# Patient Record
Sex: FEMALE | Race: WHITE | Employment: OTHER | RURAL
[De-identification: names, ages, dates, MRNs, and addresses within clinical notes are randomized per-mention and may not be internally consistent; named-entity substitution may affect disease eponyms.]

---

## 2017-03-07 ENCOUNTER — OFFICE VISIT (OUTPATIENT)
Dept: FAMILY MEDICINE CLINIC | Age: 67
End: 2017-03-07

## 2017-03-07 VITALS
SYSTOLIC BLOOD PRESSURE: 100 MMHG | HEART RATE: 86 BPM | OXYGEN SATURATION: 96 % | DIASTOLIC BLOOD PRESSURE: 62 MMHG | BODY MASS INDEX: 21.13 KG/M2 | RESPIRATION RATE: 16 BRPM | WEIGHT: 127 LBS

## 2017-03-07 DIAGNOSIS — J06.9 URI WITH COUGH AND CONGESTION: Primary | ICD-10-CM

## 2017-03-07 NOTE — PROGRESS NOTES
Chief Complaint   Patient presents with   Robbin Distance     productive cough       HPI    Mark Jones is a 78 y.o. female who complains of recent onset of nasal congestion, sore throat, ear fullness and lethargy. Patient also noted that OTC remedies did not help. ROS:    Denies  nausea, vomiting, diarrhea, weight loss, weight gain, hemoptysis, hematochezia or melena. Denies rash, frequency, dysuria, or joint pain. EXAM:   The patient appears well, alert, oriented x 3, in no distress. Visit Vitals    /62 (BP 1 Location: Left arm, BP Patient Position: Sitting)    Pulse 86    Resp 16    Wt 127 lb (57.6 kg)    SpO2 96%    BMI 21.13 kg/m2     HEENT:  NC/AT PERRLA, EOMI, oropharynx is clear, oral mucosa is pink and moist  Neck: supple, without JVD, thyromegaly, mass or bruit  Lungs are clear,without wheezes, rales, rubs or ronchi   Cardiovascular: RRR without MGR  Abdomen is soft without tenderness, guarding, mass or organomegaly. Extremities: no cyanosis, clubbing or edema   Neurological:  fluent, ambulatory, CN 2-12 are grossly intact  Skin:  No rash    Assessment:    1.  URI:  Viral.  Explained to patient that this is most likely a viral infection and that antibiotics are not indicated at this time. If \"alarm\" sx such as high fever, confusion, SOB, occur, the patient understands that they will need reevaluation by Provider.         Plan:  Rest, Fluids and Tylenol      Adrianna Caceres MD

## 2017-03-07 NOTE — MR AVS SNAPSHOT
Visit Information Date & Time Provider Department Dept. Phone Encounter #  
 3/7/2017 10:00 AM Kalie Chatman, [address] [id number] Upcoming Health Maintenance Date Due Hepatitis C Screening 1950 BREAST CANCER SCRN MAMMOGRAM 4/16/1999 FOBT Q 1 YEAR AGE 50-75 4/16/1999 ZOSTER VACCINE AGE 60> 4/16/2009 GLAUCOMA SCREENING Q2Y 4/16/2014 OSTEOPOROSIS SCREENING (DEXA) 4/16/2014 MEDICARE YEARLY EXAM 6/23/2017 DTaP/Tdap/Td series (2 - Td) 5/10/2023 Allergies as of 3/7/2017  Review Complete On: 3/7/2017 By: Kalie Chatman MD  
  
 Severity Noted Reaction Type Reactions Shellfish Derived  06/19/2015    Nausea and Vomiting Scallops, mullusks, OK with shrimp and crab Sulfur Low 01/26/2015   Side Effect Hives Current Immunizations  Reviewed on 12/7/2015 Name Date Influenza High Dose Vaccine PF 11/17/2016  4:41 PM  
 Influenza Vaccine 12/7/2015 Pneumococcal Conjugate (PCV-13) 5/18/2015 Pneumococcal Polysaccharide (PPSV-23) 5/18/2016  1:52 PM  
 Tdap 5/10/2013 Not reviewed this visit Vitals BP Pulse Resp Weight(growth percentile) SpO2 BMI  
 100/62 (BP 1 Location: Left arm, BP Patient Position: Sitting) 86 16 127 lb (57.6 kg) 96% 21.13 kg/m2 OB Status Smoking Status Postmenopausal Never Smoker BMI and BSA Data Body Mass Index Body Surface Area  
 21.13 kg/m 2 1.63 m 2 Preferred Pharmacy Pharmacy Name Phone [address], [address] AT [facility] OF  [address] [phone] Your Updated Medication List  
  
   
This list is accurate as of: 3/7/17 10:17 AM.  Always use your most recent med list.  
  
  
  
  
 fluticasone 50 mcg/actuation nasal spray Commonly known as:  FLONASE  
USE 2 SPRAYS IN EACH NOSTRIL EVERY DAY  
  
 losartan-hydroCHLOROthiazide 50-12.5 mg per tablet Commonly known as:  HYZAAR  
TAKE 1 TAB BY MOUTH DAILY. Introducing [facility] & HEALTH SERVICES! Dear Mark Camacho: 
Thank you for requesting a [email or web address] account. Our records indicate that you already have an active [email or web address] account. You can access your account anytime at [email or web address] Did you know that you can access your hospital and ER discharge instructions at any time in [email or web address]? You can also review all of your test results from your hospital stay or ER visit. Additional Information If you have questions, please visit the Frequently Asked Questions section of the [email or web address] website at [email or web address] Remember, [email or web address] is NOT to be used for urgent needs. For medical emergencies, dial 911. Now available from your iPhone and Android! Please provide this summary of care documentation to your next provider. Your primary care clinician is listed as Flor Reid. If you have any questions after today's visit, please call [phone].

## 2017-03-15 ENCOUNTER — OFFICE VISIT (OUTPATIENT)
Dept: FAMILY MEDICINE CLINIC | Age: 67
End: 2017-03-15

## 2017-03-15 VITALS
DIASTOLIC BLOOD PRESSURE: 79 MMHG | WEIGHT: 128.8 LBS | SYSTOLIC BLOOD PRESSURE: 128 MMHG | HEIGHT: 65 IN | BODY MASS INDEX: 21.46 KG/M2 | HEART RATE: 80 BPM | RESPIRATION RATE: 18 BRPM | OXYGEN SATURATION: 98 % | TEMPERATURE: 97.3 F

## 2017-03-15 DIAGNOSIS — R05.9 COUGH: Primary | ICD-10-CM

## 2017-03-15 RX ORDER — HYDROCODONE POLISTIREX AND CHLORPHENIRAMINE POLISTIREX 10; 8 MG/5ML; MG/5ML
5 SUSPENSION, EXTENDED RELEASE ORAL
Qty: 473 ML | Refills: 0 | Status: SHIPPED | OUTPATIENT
Start: 2017-03-15 | End: 2017-07-27

## 2017-03-15 NOTE — MR AVS SNAPSHOT
Visit Information Date & Time Provider Department Dept. Phone Encounter #  
 3/15/2017  8:30 AM Nevin Mcmahon, [address] [address] [id number] Upcoming Health Maintenance Date Due Hepatitis C Screening 1950 BREAST CANCER SCRN MAMMOGRAM 4/16/1999 FOBT Q 1 YEAR AGE 50-75 4/16/1999 ZOSTER VACCINE AGE 60> 4/16/2009 GLAUCOMA SCREENING Q2Y 4/16/2014 OSTEOPOROSIS SCREENING (DEXA) 4/16/2014 MEDICARE YEARLY EXAM 6/23/2017 DTaP/Tdap/Td series (2 - Td) 5/10/2023 Allergies as of 3/15/2017  Review Complete On: 3/15/2017 By: Nevin Mcmahon MD  
  
 Severity Noted Reaction Type Reactions Shellfish Derived  06/19/2015    Nausea and Vomiting Scallops, mullusks, OK with shrimp and crab Sulfur Low 01/26/2015   Side Effect Hives Current Immunizations  Reviewed on 12/7/2015 Name Date Influenza High Dose Vaccine PF 11/17/2016  4:41 PM  
 Influenza Vaccine 12/7/2015 Pneumococcal Conjugate (PCV-13) 5/18/2015 Pneumococcal Polysaccharide (PPSV-23) 5/18/2016  1:52 PM  
 Tdap 5/10/2013 Not reviewed this visit You Were Diagnosed With   
  
 Codes Comments Cough    -  Primary ICD-10-CM: D97 ICD-9-CM: 002. 2 Vitals BP Pulse Temp Resp Height(growth percentile) Weight(growth percentile) 128/79 (BP 1 Location: Left arm, BP Patient Position: Sitting) 80 97.3 °F (36.3 °C) (Oral) 18 5' 5\" (1.651 m) 128 lb 12.8 oz (58.4 kg) SpO2 BMI OB Status Smoking Status 98% 21.43 kg/m2 Postmenopausal Never Smoker Vitals History BMI and BSA Data Body Mass Index Body Surface Area  
 21.43 kg/m 2 1.64 m 2 Preferred Pharmacy Pharmacy Name Phone [address], [address] AT [facility] OF [address] SNE[address] MEMPrimo Whitney [phone] Your Updated Medication List  
  
   
This list is accurate as of: 3/15/17  9:05 AM.  Always use your most recent med list.  
  
 chlorpheniramine-HYDROcodone 10-8 mg/5 mL suspension Commonly known as:  Meldoy Ped Take 5 mL by mouth every twelve (12) hours as needed for Cough. Max Daily Amount: 10 mL. fluticasone 50 mcg/actuation nasal spray Commonly known as:  FLONASE  
USE 2 SPRAYS IN EACH NOSTRIL EVERY DAY  
  
 losartan-hydroCHLOROthiazide 50-12.5 mg per tablet Commonly known as:  HYZAAR  
TAKE 1 TAB BY MOUTH DAILY. Prescriptions Printed Refills  
 chlorpheniramine-HYDROcodone (TUSSIONEX) 10-8 mg/5 mL suspension 0 Sig: Take 5 mL by mouth every twelve (12) hours as needed for Cough. Max Daily Amount: 10 mL. Class: Print Route: Oral  
  
Introducing [facility]! Dear Chante Mello: 
Thank you for requesting a Webcrumbz account. Our records indicate that you already have an active Webcrumbz account. You can access your account anytime at [email or web address] Did you know that you can access your hospital and ER discharge instructions at any time in Webcrumbz? You can also review all of your test results from your hospital stay or ER visit. Additional Information If you have questions, please visit the Frequently Asked Questions section of the Webcrumbz website at [email or web address] Remember, Webcrumbz is NOT to be used for urgent needs. For medical emergencies, dial 911. Now available from your iPhone and Android! Please provide this summary of care documentation to your next provider. Your primary care clinician is listed as Marilee Ray. If you have any questions after today's visit, please call [phone].

## 2017-03-16 ENCOUNTER — TELEPHONE (OUTPATIENT)
Dept: FAMILY MEDICINE CLINIC | Age: 67
End: 2017-03-16

## 2017-03-16 NOTE — TELEPHONE ENCOUNTER
Spoke with Claudette, only got 100ml of cough syrup due to cost, worked but still coughing and 's cough is persistent. Just wanted to update status.

## 2017-03-28 ENCOUNTER — LAB ONLY (OUTPATIENT)
Dept: FAMILY MEDICINE CLINIC | Age: 67
End: 2017-03-28

## 2017-03-28 DIAGNOSIS — I10 ESSENTIAL HYPERTENSION: ICD-10-CM

## 2017-03-28 DIAGNOSIS — I15.9 SECONDARY HYPERTENSION: Primary | ICD-10-CM

## 2017-03-28 NOTE — MR AVS SNAPSHOT
Visit Information Date & Time Provider Department Dept. Phone Encounter #  
 3/28/2017  2:40 PM Henri Rosa [id number] Upcoming Health Maintenance Date Due Hepatitis C Screening 1950 BREAST CANCER SCRN MAMMOGRAM 4/16/1999 FOBT Q 1 YEAR AGE 50-75 4/16/1999 ZOSTER VACCINE AGE 60> 4/16/2009 GLAUCOMA SCREENING Q2Y 4/16/2014 OSTEOPOROSIS SCREENING (DEXA) 4/16/2014 MEDICARE YEARLY EXAM 6/23/2017 DTaP/Tdap/Td series (2 - Td) 5/10/2023 Allergies as of 3/28/2017  Review Complete On: 3/15/2017 By: Robbin Recinos MD  
  
 Severity Noted Reaction Type Reactions Shellfish Derived  06/19/2015    Nausea and Vomiting Scallops, mullusks, OK with shrimp and crab Sulfur Low 01/26/2015   Side Effect Hives Current Immunizations  Reviewed on 12/7/2015 Name Date Influenza High Dose Vaccine PF 11/17/2016  4:41 PM  
 Influenza Vaccine 12/7/2015 Pneumococcal Conjugate (PCV-13) 5/18/2015 Pneumococcal Polysaccharide (PPSV-23) 5/18/2016  1:52 PM  
 Tdap 5/10/2013 Not reviewed this visit You Were Diagnosed With   
  
 Codes Comments Secondary hypertension    -  Primary ICD-10-CM: I15.9 ICD-9-CM: 405.99 Essential hypertension     ICD-10-CM: I10 
ICD-9-CM: 401.9 Vitals OB Status Smoking Status Postmenopausal Never Smoker Preferred Pharmacy Pharmacy Name Phone [address], [address] AT [facility] OF [address] Maged Escalera [phone] Your Updated Medication List  
  
   
This list is accurate as of: 3/28/17  2:49 PM.  Always use your most recent med list.  
  
  
  
  
 chlorpheniramine-HYDROcodone 10-8 mg/5 mL suspension Commonly known as:  Merry Watson Take 5 mL by mouth every twelve (12) hours as needed for Cough. Max Daily Amount: 10 mL. fluticasone 50 mcg/actuation nasal spray Commonly known as:  FLONASE  
USE 2 SPRAYS IN EACH NOSTRIL EVERY DAY  
  
 losartan-hydroCHLOROthiazide 50-12.5 mg per tablet Commonly known as:  HYZAAR  
TAKE 1 TAB BY MOUTH DAILY. We Performed the Following CBC WITH AUTOMATED DIFF [72720 CPT(R)] LIPID PANEL [17756 CPT(R)] METABOLIC PANEL, COMPREHENSIVE [08302 CPT(R)] MO COLLECTION VENOUS BLOOD,VENIPUNCTURE [id number] CPT(R)] TSH 3RD GENERATION [95246 CPT(R)] Patient Instructions If you have any questions regarding Ticket Cake, you may call Ticket Cake support at [phone]. Introducing [facility] SERVICES! Dear Hollie Casey: 
Thank you for requesting a TixAlert account. Our records indicate that you already have an active TixAlert account. You can access your account anytime at [email or web address]. [email or web address]/Ticket Cake Did you know that you can access your hospital and ER discharge instructions at any time in TixAlert? You can also review all of your test results from your hospital stay or ER visit. Additional Information If you have questions, please visit the Frequently Asked Questions section of the TixAlert website at [email or web address]://[email or web address]. Streamline/Ticket Cake/. Remember, TixAlert is NOT to be used for urgent needs. For medical emergencies, dial 911. Now available from your iPhone and Android! Please provide this summary of care documentation to your next provider. Your primary care clinician is listed as Kinga Wray. If you have any questions after today's visit, please call [phone].

## 2017-03-28 NOTE — PATIENT INSTRUCTIONS
If you have any questions regarding Democraviset, you may call QponDirect support at [phone].

## 2017-03-29 LAB
ALBUMIN SERPL-MCNC: 4.1 G/DL (ref 3.6–4.8)
ALBUMIN/GLOB SERPL: 1.6 {RATIO} (ref 1.2–2.2)
ALP SERPL-CCNC: 83 IU/L (ref 39–117)
ALT SERPL-CCNC: 16 IU/L (ref 0–32)
AST SERPL-CCNC: 15 IU/L (ref 0–40)
BASOPHILS # BLD AUTO: 0 X10E3/UL (ref 0–0.2)
BASOPHILS NFR BLD AUTO: 0 %
BILIRUB SERPL-MCNC: 0.2 MG/DL (ref 0–1.2)
BUN SERPL-MCNC: 18 MG/DL (ref 8–27)
BUN/CREAT SERPL: 29 (ref 11–26)
CALCIUM SERPL-MCNC: 9.3 MG/DL (ref 8.7–10.3)
CHLORIDE SERPL-SCNC: 100 MMOL/L (ref 96–106)
CHOLEST SERPL-MCNC: 180 MG/DL (ref 100–199)
CO2 SERPL-SCNC: 25 MMOL/L (ref 18–29)
CREAT SERPL-MCNC: 0.62 MG/DL (ref 0.57–1)
EOSINOPHIL # BLD AUTO: 0.1 X10E3/UL (ref 0–0.4)
EOSINOPHIL NFR BLD AUTO: 1 %
ERYTHROCYTE [DISTWIDTH] IN BLOOD BY AUTOMATED COUNT: 13.3 % (ref 12.3–15.4)
GLOBULIN SER CALC-MCNC: 2.5 G/DL (ref 1.5–4.5)
GLUCOSE SERPL-MCNC: 126 MG/DL (ref 65–99)
HCT VFR BLD AUTO: 38.7 % (ref 34–46.6)
HDLC SERPL-MCNC: 51 MG/DL
HGB BLD-MCNC: 13.1 G/DL (ref 11.1–15.9)
IMM GRANULOCYTES # BLD: 0 X10E3/UL (ref 0–0.1)
IMM GRANULOCYTES NFR BLD: 0 %
LDLC SERPL CALC-MCNC: 78 MG/DL (ref 0–99)
LYMPHOCYTES # BLD AUTO: 2.4 X10E3/UL (ref 0.7–3.1)
LYMPHOCYTES NFR BLD AUTO: 43 %
MCH RBC QN AUTO: 32.2 PG (ref 26.6–33)
MCHC RBC AUTO-ENTMCNC: 33.9 G/DL (ref 31.5–35.7)
MCV RBC AUTO: 95 FL (ref 79–97)
MONOCYTES # BLD AUTO: 0.4 X10E3/UL (ref 0.1–0.9)
MONOCYTES NFR BLD AUTO: 7 %
NEUTROPHILS # BLD AUTO: 2.7 X10E3/UL (ref 1.4–7)
NEUTROPHILS NFR BLD AUTO: 49 %
PLATELET # BLD AUTO: 246 X10E3/UL (ref 150–379)
POTASSIUM SERPL-SCNC: 3.6 MMOL/L (ref 3.5–5.2)
PROT SERPL-MCNC: 6.6 G/DL (ref 6–8.5)
RBC # BLD AUTO: 4.07 X10E6/UL (ref 3.77–5.28)
SODIUM SERPL-SCNC: 145 MMOL/L (ref 134–144)
TRIGL SERPL-MCNC: 257 MG/DL (ref 0–149)
TSH SERPL DL<=0.005 MIU/L-ACNC: 1.5 UIU/ML (ref 0.45–4.5)
VLDLC SERPL CALC-MCNC: 51 MG/DL (ref 5–40)
WBC # BLD AUTO: 5.6 X10E3/UL (ref 3.4–10.8)

## 2017-05-02 ENCOUNTER — TELEPHONE (OUTPATIENT)
Dept: FAMILY MEDICINE CLINIC | Age: 67
End: 2017-05-02

## 2017-05-02 ENCOUNTER — OFFICE VISIT (OUTPATIENT)
Dept: FAMILY MEDICINE CLINIC | Age: 67
End: 2017-05-02

## 2017-05-02 VITALS
BODY MASS INDEX: 21.33 KG/M2 | TEMPERATURE: 98.1 F | OXYGEN SATURATION: 96 % | WEIGHT: 128 LBS | SYSTOLIC BLOOD PRESSURE: 121 MMHG | HEART RATE: 78 BPM | RESPIRATION RATE: 16 BRPM | DIASTOLIC BLOOD PRESSURE: 69 MMHG | HEIGHT: 65 IN

## 2017-05-02 DIAGNOSIS — M25.473 ANKLE EDEMA: Primary | ICD-10-CM

## 2017-05-02 NOTE — TELEPHONE ENCOUNTER
[phone] contact # had a minor injury 3 wks ago and still some swollen and need to have it look at or possibly send to specialist if need be? ?  Please call @ [phone].   Thanks,

## 2017-05-02 NOTE — TELEPHONE ENCOUNTER
Hurt right foot about 3 weeks ago, not getting better, should she see a specialist, scheduled to see Dr. Ailin Hu first before we can determine if she needs a referral

## 2017-05-02 NOTE — MR AVS SNAPSHOT
Visit Information Date & Time Provider Department Dept. Phone Encounter #  
 5/2/2017  3:30 PM Dora Sweeney MD [address] [place] [address] [id number] Follow-up Instructions Return if symptoms worsen or fail to improve. Follow-up and Disposition History Upcoming Health Maintenance Date Due Hepatitis C Screening 1950 BREAST CANCER SCRN MAMMOGRAM 4/16/1999 FOBT Q 1 YEAR AGE 50-75 4/16/1999 ZOSTER VACCINE AGE 60> 4/16/2009 GLAUCOMA SCREENING Q2Y 4/16/2014 OSTEOPOROSIS SCREENING (DEXA) 4/16/2014 MEDICARE YEARLY EXAM 6/23/2017 INFLUENZA AGE 9 TO ADULT 8/1/2017 DTaP/Tdap/Td series (2 - Td) 5/10/2023 Allergies as of 5/2/2017  Review Complete On: 5/2/2017 By: Dora Sweeney MD  
  
 Severity Noted Reaction Type Reactions Shellfish Derived  06/19/2015    Nausea and Vomiting Scallops, mullusks, OK with shrimp and crab Sulfur Low 01/26/2015   Side Effect Hives Current Immunizations  Reviewed on 12/7/2015 Name Date Influenza High Dose Vaccine PF 11/17/2016  4:41 PM  
 Influenza Vaccine 12/7/2015 Pneumococcal Conjugate (PCV-13) 5/18/2015 Pneumococcal Polysaccharide (PPSV-23) 5/18/2016  1:52 PM  
 Tdap 5/10/2013 Not reviewed this visit You Were Diagnosed With   
  
 Codes Comments Ankle edema    -  Primary ICD-10-CM: M25.473 ICD-9-CM: 719.07 Vitals BP Pulse Temp Resp Height(growth percentile) Weight(growth percentile) 121/69 (BP 1 Location: Right arm, BP Patient Position: Sitting) 78 98.1 °F (36.7 °C) (Oral) 16 5' 5\" (1.651 m) 128 lb (58.1 kg) SpO2 BMI OB Status Smoking Status 96% 21.3 kg/m2 Postmenopausal Never Smoker BMI and BSA Data Body Mass Index Body Surface Area  
 21.3 kg/m 2 1.63 m 2 Preferred Pharmacy Pharmacy Name Phone [address], [address] [facility] AT [facility] OF  [address]. Kristie Schultekin [phone] Your Updated Medication List  
  
   
This list is accurate as of: 5/2/17  4:22 PM.  Always use your most recent med list.  
  
  
  
  
 chlorpheniramine-HYDROcodone 10-8 mg/5 mL suspension Commonly known as:  Carlene Min Take 5 mL by mouth every twelve (12) hours as needed for Cough. Max Daily Amount: 10 mL. fluticasone 50 mcg/actuation nasal spray Commonly known as:  FLONASE  
USE 2 SPRAYS IN EACH NOSTRIL EVERY DAY  
  
 losartan-hydroCHLOROthiazide 50-12.5 mg per tablet Commonly known as:  HYZAAR  
TAKE 1 TABLET BY MOUTH EVERY DAY Follow-up Instructions Return if symptoms worsen or fail to improve. Patient Instructions If you have any questions regarding Funderbeam, you may call Funderbeam support at [phone]. Introducing [facility]! Dear Ashley Flores: 
Thank you for requesting a Revionics account. Our records indicate that you already have an active Revionics account. You can access your account anytime at [email or web address] Did you know that you can access your hospital and ER discharge instructions at any time in Revionics? You can also review all of your test results from your hospital stay or ER visit. Additional Information If you have questions, please visit the Frequently Asked Questions section of the Revionics website at [email or web address] Remember, Revionics is NOT to be used for urgent needs. For medical emergencies, dial 911. Now available from your iPhone and Android! Please provide this summary of care documentation to your next provider. Your primary care clinician is listed as Meryle Chuck. If you have any questions after today's visit, please call [phone].

## 2017-05-02 NOTE — PROGRESS NOTES
Chief Complaint   Patient presents with    Foot Swelling     right         HPI:       is a 75 y.o. female who presents today with very minor swelling of the right foot. She was well until a month ago when she sprained this ankle. Since then has noted mild swelling of the top of the right foot. After traveling in the care yesterday, she noted more swelling and called the office. Allergies   Allergen Reactions    Shellfish Derived Nausea and Vomiting     Scallops, mullusks, OK with shrimp and crab    Sulfur Hives       Current Outpatient Prescriptions   Medication Sig    losartan-hydroCHLOROthiazide (HYZAAR) 50-12.5 mg per tablet TAKE 1 TABLET BY MOUTH EVERY DAY    fluticasone (FLONASE) 50 mcg/actuation nasal spray USE 2 SPRAYS IN EACH NOSTRIL EVERY DAY    chlorpheniramine-HYDROcodone (TUSSIONEX) 10-8 mg/5 mL suspension Take 5 mL by mouth every twelve (12) hours as needed for Cough. Max Daily Amount: 10 mL. No current facility-administered medications for this visit. Past Medical History:   Diagnosis Date    Basal cell carcinoma     excised from nose and clavicl    Cerumen impaction     Hypertension     Nystagmus     OA (osteoarthritis) of finger     Peripheral sensory neuropathy age 46    mild    Thyroid nodule     UTI (lower urinary tract infection)     recurrent         ROS:  Denies fever, chills, cough, chest pain, SOB,  nausea, vomiting, or diarrhea. Denies wt loss, wt gain, hemoptysis, hematochezia or melena.     Physical Examination:    /69 (BP 1 Location: Right arm, BP Patient Position: Sitting)  Pulse 78  Temp 98.1 °F (36.7 °C) (Oral)   Resp 16  Ht 5' 5\" (1.651 m)  Wt 128 lb (58.1 kg)  SpO2 96%  BMI 21.3 kg/m2    General: Alert and Ox3, Fluent speech  HEENT:  NC/AT, EOMI, OP: clear  Neck:  Supple, no adenopathy, JVD, mass or bruit  Chest:  Clear to Ausculation, without wheezes, rales, rubs or ronchi  Cardiac: RRR  Abdomen:  +BS, soft, nontender without palpable HSM  Extremities:  Trace edema on the dorsal aspect of the right foot that does not extend above the ankle. Nabila's sign is absent. Neurologic:  Ambulatory without assist, CN 2-12 grossly intact. Moves all extremities. Skin: no rash  Lymphadenopathy: no cervical or supraclavicular nodes      ASSESSMENT AND PLAN:     1. Trace right ankle edema due to recent ankle trauma: no evidence for DVT. Educated. RTC if sx worsen. No orders of the defined types were placed in this encounter.       Niki Holt MD, [address]

## 2017-07-28 ENCOUNTER — OFFICE VISIT (OUTPATIENT)
Dept: FAMILY MEDICINE CLINIC | Age: 67
End: 2017-07-28

## 2017-07-28 VITALS
RESPIRATION RATE: 19 BRPM | HEART RATE: 82 BPM | OXYGEN SATURATION: 98 % | HEIGHT: 65 IN | BODY MASS INDEX: 20.73 KG/M2 | DIASTOLIC BLOOD PRESSURE: 68 MMHG | SYSTOLIC BLOOD PRESSURE: 125 MMHG | WEIGHT: 124.4 LBS

## 2017-07-28 DIAGNOSIS — Z12.31 ENCOUNTER FOR SCREENING MAMMOGRAM FOR BREAST CANCER: ICD-10-CM

## 2017-07-28 DIAGNOSIS — Z00.00 MEDICARE ANNUAL WELLNESS VISIT, SUBSEQUENT: Primary | ICD-10-CM

## 2017-07-28 DIAGNOSIS — Z11.59 ENCOUNTER FOR HEPATITIS C SCREENING TEST FOR LOW RISK PATIENT: ICD-10-CM

## 2017-07-28 DIAGNOSIS — I10 ESSENTIAL HYPERTENSION: ICD-10-CM

## 2017-07-28 PROBLEM — I15.9 SECONDARY HYPERTENSION: Status: ACTIVE | Noted: 2017-07-28

## 2017-07-28 PROBLEM — I15.9 SECONDARY HYPERTENSION: Status: RESOLVED | Noted: 2017-07-28 | Resolved: 2017-07-28

## 2017-07-28 NOTE — MR AVS SNAPSHOT
Visit Information Date & Time Provider Department Dept. Phone Encounter #  
 7/28/2017  1:00 PM Lisa Borja, Marin Metz [id number] Upcoming Health Maintenance Date Due Hepatitis C Screening 1950 BREAST CANCER SCRN MAMMOGRAM 4/16/1999 MEDICARE YEARLY EXAM 6/23/2017 INFLUENZA AGE 9 TO ADULT 8/1/2017 GLAUCOMA SCREENING Q2Y 7/24/2019 COLONOSCOPY 2/21/2020 DTaP/Tdap/Td series (2 - Td) 5/10/2023 Allergies as of 7/28/2017  Review Complete On: 7/28/2017 By: Lisa Borja MD  
  
 Severity Noted Reaction Type Reactions Shellfish Derived  06/19/2015    Nausea and Vomiting Mullusks Sulfa (Sulfonamide Antibiotics)  11/12/2013    Other (comments) Sulfur Low 01/26/2015   Side Effect Hives Current Immunizations  Reviewed on 7/28/2017 Name Date Influenza High Dose Vaccine PF 11/17/2016  4:41 PM  
 Influenza Vaccine 12/7/2015 Pneumococcal Conjugate (PCV-13) 5/18/2015 Pneumococcal Polysaccharide (PPSV-23) 5/18/2016  1:52 PM  
 Tdap 5/10/2013 Reviewed by Jose Martin Bang on 7/28/2017 at  1:00 PM  
You Were Diagnosed With   
  
 Codes Comments Medicare annual wellness visit, subsequent    -  Primary ICD-10-CM: Z00.00 ICD-9-CM: V70.0 Essential hypertension     ICD-10-CM: I10 
ICD-9-CM: 401.9 Encounter for screening mammogram for breast cancer     ICD-10-CM: Z12.31 
ICD-9-CM: V76.12 Encounter for hepatitis C screening test for low risk patient     ICD-10-CM: Z11.59 
ICD-9-CM: V73.89 Vitals BP Pulse Resp Height(growth percentile) Weight(growth percentile) SpO2  
 125/68 (BP 1 Location: Left arm, BP Patient Position: Sitting) 82 19 5' 5\" (1.651 m) 124 lb 6.4 oz (56.4 kg) 98% BMI OB Status Smoking Status 20.7 kg/m2 Postmenopausal Never Smoker Vitals History BMI and BSA Data Body Mass Index Body Surface Area  20.7 kg/m 2 1.61 m 2  
  
  
 Preferred Pharmacy Pharmacy Name Phone [address], [address] [facility] AT [facility] OF [address] N[address] JEFF Kendall [phone] Your Updated Medication List  
  
   
This list is accurate as of: 7/28/17  1:48 PM.  Always use your most recent med list.  
  
  
  
  
 fluticasone 50 mcg/actuation nasal spray Commonly known as:  FLONASE  
USE 2 SPRAYS IN EACH NOSTRIL EVERY DAY  
  
 losartan-hydroCHLOROthiazide 50-12.5 mg per tablet Commonly known as:  HYZAAR  
TAKE 1 TABLET BY MOUTH EVERY DAY We Performed the Following CBC WITH AUTOMATED DIFF [02252 CPT(R)] HEPATITIS C AB [35771 CPT(R)] LIPID PANEL [03393 CPT(R)] METABOLIC PANEL, COMPREHENSIVE [66993 CPT(R)] TSH 3RD GENERATION [78067 CPT(R)] To-Do List   
 Around 09/27/2017 Imaging:  RADU MAMMO BI SCREENING INCL CAD Patient Instructions If you have any questions regarding "[employer] you may call "[employer] support at [phone]. Introducing [facility] & HEALTH SERVICES! Dear Sheryle Hong: 
Thank you for requesting a [employer] account. Our records indicate that you already have an active [employer] account. You can access your account anytime at [email or web address]://"[email or web address]. New Relic/"[employer] Did you know that you can access your hospital and ER discharge instructions at any time in [employer]? You can also review all of your test results from your hospital stay or ER visit. Additional Information If you have questions, please visit the Frequently Asked Questions section of the [employer] website at https://"[email or web address]. New Relic/"[employer]/. Remember, [employer] is NOT to be used for urgent needs. For medical emergencies, dial 911. Now available from your iPhone and Android! Please provide this summary of care documentation to your next provider. Your primary care clinician is listed as Kamron Whitehead. If you have any questions after today's visit, please call [phone].

## 2017-07-28 NOTE — PROGRESS NOTES
Chief Complaint   Patient presents with    Annual Wellness Visit         HPI:       is a 75 y.o. female. Retired homemaker. History of UTI, HTN, thyroid nodule,OA, and neuropathy. BP remains well controlled with meds. No side effects. Due for SAWV. Allergies   Allergen Reactions    Shellfish Derived Nausea and Vomiting     Mullusks    Sulfa (Sulfonamide Antibiotics) Other (comments)    Sulfur Hives       Current Outpatient Prescriptions   Medication Sig    losartan-hydroCHLOROthiazide (HYZAAR) 50-12.5 mg per tablet TAKE 1 TABLET BY MOUTH EVERY DAY    fluticasone (FLONASE) 50 mcg/actuation nasal spray USE 2 SPRAYS IN EACH NOSTRIL EVERY DAY     No current facility-administered medications for this visit. Past Medical History:   Diagnosis Date    Basal cell carcinoma     excised from nose and clavicl    Cerumen impaction     Hypertension     Nystagmus     OA (osteoarthritis) of finger     Peripheral sensory neuropathy (HCC) age 46    mild    Thyroid nodule     UTI (lower urinary tract infection)     recurrent         ROS:  Denies fever, chills, cough, chest pain, SOB,  nausea, vomiting, or diarrhea. Denies wt loss, wt gain, hemoptysis, hematochezia or melena. Physical Examination:    /68 (BP 1 Location: Left arm, BP Patient Position: Sitting)  Pulse 82  Resp 19  Ht 5' 5\" (1.651 m)  Wt 124 lb 6.4 oz (56.4 kg)  SpO2 98%  BMI 20.7 kg/m2    General: Alert and Ox3, Fluent speech  HEENT:  NC/AT, EOMI, OP: clear  Neck:  Supple, no adenopathy, JVD, mass or bruit  Chest:  Clear to Ausculation, without wheezes, rales, rubs or ronchi  Cardiac: RRR  Abdomen:  +BS, soft, nontender without palpable HSM  Extremities:  No cyanosis, clubbing or edema  Neurologic:  Ambulatory without assist, CN 2-12 grossly intact. Moves all extremities. Skin: no rash  Lymphadenopathy: no cervical or supraclavicular nodes      ASSESSMENT AND PLAN:     1.   Well controlled HTN: continue current meds.  2.  SAWV  3.  Scheduling Mammo    No orders of the defined types were placed in this encounter. Chaya Gallegos MD, FACP        ______________________________________________________________________    Elle Jones is a 75 y.o. female and presents for annual Medicare Wellness Visit. Problem List: Reviewed with patient and discussed risk factors. Patient Active Problem List   Diagnosis Code    Nonspecific abnormal finding in stool contents R19.5    Essential hypertension I10    Actinic keratoses L57.0    UTI (lower urinary tract infection) N39.0       Current medical providers:  Patient Care Team:  Kristin Hancock MD as PCP - General (Internal Medicine)  Jayla Chamberlain MD (General Surgery)    [facility], [facility], Medications/Allergies: reviewed, on chart. Female Alcohol Screening: On any occasion during the past 3 months, have you had more than 3 drinks containing alcohol? No    Do you average more than 7 drinks per week? No    ROS:  Constitutional: No fever, chills or weight loss  Respiratory: No cough, SOB   CV: No chest pain or Palpitations    Objective:  Visit Vitals    /68 (BP 1 Location: Left arm, BP Patient Position: Sitting)    Pulse 82    Resp 19    Ht 5' 5\" (1.651 m)    Wt 124 lb 6.4 oz (56.4 kg)    SpO2 98%    BMI 20.7 kg/m2    Body mass index is 20.7 kg/(m^2). Assessment of cognitive impairment: Alert and oriented x 3    Depression Screen:   PHQ over the last two weeks 7/28/2017   PHQ Not Done -   Little interest or pleasure in doing things Not at all   Feeling down, depressed or hopeless Not at all   Total Score PHQ 2 0       Fall Risk Assessment:    Fall Risk Assessment, last 12 mths 7/28/2017   Able to walk? Yes   Fall in past 12 months? No       Functional Ability:   Does the patient exhibit a steady gait? yes   How long did it take the patient to get up and walk from a sitting position?  12 sec   Is the patient self reliant?  (ie can do own laundry, meals, household chores)  yes     Does the patient handle his/her own medications? yes     Does the patient handle his/her own money? yes     Is the patients home safe (ie good lighting, handrails on stairs and bath, etc.)? yes     Did you notice or did patient express any hearing difficulties? yes     Did you notice or did patient express any vision difficulties? no       Advance Care Planning:   Patient was offered the opportunity to discuss advance care planning:  yes     Does patient have an Advance Directive:  yes   If no, did you provide information on Caring Connections?  no       Plan:      No orders of the defined types were placed in this encounter. Health Maintenance   Topic Date Due    Hepatitis C Screening  1950    BREAST CANCER SCRN MAMMOGRAM  04/16/1999    MEDICARE YEARLY EXAM  06/23/2017    INFLUENZA AGE 9 TO ADULT  08/01/2017    GLAUCOMA SCREENING Q2Y  07/24/2019    COLONOSCOPY  02/21/2020    DTaP/Tdap/Td series (2 - Td) 05/10/2023    OSTEOPOROSIS SCREENING (DEXA)  Completed    ZOSTER VACCINE AGE 60>  Addressed    Pneumococcal 65+ Low/Medium Risk  Completed       *Patient verbalized understanding and agreement with the plan. A copy of the After Visit Summary with personalized health plan was given to the patient today.

## 2017-07-28 NOTE — ACP (ADVANCE CARE PLANNING)
Patient stated she had an Advanced Directive and would bring it in on her next visit to be scanned into her chart.

## 2017-07-28 NOTE — ACP (ADVANCE CARE PLANNING)
In the event that she is unable to speak for herself, please contact , Mayela Lacey, at [address] [place] [address]

## 2017-07-29 LAB
ALBUMIN SERPL-MCNC: 4.3 G/DL (ref 3.6–4.8)
ALBUMIN/GLOB SERPL: 1.8 {RATIO} (ref 1.2–2.2)
ALP SERPL-CCNC: 84 IU/L (ref 39–117)
ALT SERPL-CCNC: 17 IU/L (ref 0–32)
AST SERPL-CCNC: 15 IU/L (ref 0–40)
BASOPHILS # BLD AUTO: 0 X10E3/UL (ref 0–0.2)
BASOPHILS NFR BLD AUTO: 1 %
BILIRUB SERPL-MCNC: 0.4 MG/DL (ref 0–1.2)
BUN SERPL-MCNC: 16 MG/DL (ref 8–27)
BUN/CREAT SERPL: 30 (ref 12–28)
CALCIUM SERPL-MCNC: 9.4 MG/DL (ref 8.7–10.3)
CHLORIDE SERPL-SCNC: 101 MMOL/L (ref 96–106)
CHOLEST SERPL-MCNC: 193 MG/DL (ref 100–199)
CO2 SERPL-SCNC: 26 MMOL/L (ref 18–29)
CREAT SERPL-MCNC: 0.54 MG/DL (ref 0.57–1)
EOSINOPHIL # BLD AUTO: 0.1 X10E3/UL (ref 0–0.4)
EOSINOPHIL NFR BLD AUTO: 2 %
ERYTHROCYTE [DISTWIDTH] IN BLOOD BY AUTOMATED COUNT: 13.7 % (ref 12.3–15.4)
GLOBULIN SER CALC-MCNC: 2.4 G/DL (ref 1.5–4.5)
GLUCOSE SERPL-MCNC: 94 MG/DL (ref 65–99)
HCT VFR BLD AUTO: 40.7 % (ref 34–46.6)
HCV AB S/CO SERPL IA: 0.1 S/CO RATIO (ref 0–0.9)
HDLC SERPL-MCNC: 58 MG/DL
HGB BLD-MCNC: 13.7 G/DL (ref 11.1–15.9)
IMM GRANULOCYTES # BLD: 0 X10E3/UL (ref 0–0.1)
IMM GRANULOCYTES NFR BLD: 0 %
LDLC SERPL CALC-MCNC: 81 MG/DL (ref 0–99)
LYMPHOCYTES # BLD AUTO: 2.3 X10E3/UL (ref 0.7–3.1)
LYMPHOCYTES NFR BLD AUTO: 43 %
MCH RBC QN AUTO: 31.6 PG (ref 26.6–33)
MCHC RBC AUTO-ENTMCNC: 33.7 G/DL (ref 31.5–35.7)
MCV RBC AUTO: 94 FL (ref 79–97)
MONOCYTES # BLD AUTO: 0.4 X10E3/UL (ref 0.1–0.9)
MONOCYTES NFR BLD AUTO: 7 %
NEUTROPHILS # BLD AUTO: 2.6 X10E3/UL (ref 1.4–7)
NEUTROPHILS NFR BLD AUTO: 47 %
PLATELET # BLD AUTO: 229 X10E3/UL (ref 150–379)
POTASSIUM SERPL-SCNC: 3.7 MMOL/L (ref 3.5–5.2)
PROT SERPL-MCNC: 6.7 G/DL (ref 6–8.5)
RBC # BLD AUTO: 4.34 X10E6/UL (ref 3.77–5.28)
SODIUM SERPL-SCNC: 142 MMOL/L (ref 134–144)
TRIGL SERPL-MCNC: 269 MG/DL (ref 0–149)
TSH SERPL DL<=0.005 MIU/L-ACNC: 1.22 UIU/ML (ref 0.45–4.5)
VLDLC SERPL CALC-MCNC: 54 MG/DL (ref 5–40)
WBC # BLD AUTO: 5.3 X10E3/UL (ref 3.4–10.8)

## 2017-08-31 ENCOUNTER — CLINICAL SUPPORT (OUTPATIENT)
Dept: FAMILY MEDICINE CLINIC | Age: 67
End: 2017-08-31

## 2017-08-31 DIAGNOSIS — Z23 ENCOUNTER FOR IMMUNIZATION: Primary | ICD-10-CM

## 2017-09-01 ENCOUNTER — OFFICE VISIT (OUTPATIENT)
Dept: FAMILY MEDICINE CLINIC | Age: 67
End: 2017-09-01

## 2017-09-01 DIAGNOSIS — H61.23 EXCESSIVE EAR WAX, BILATERAL: Primary | ICD-10-CM

## 2017-09-01 NOTE — MR AVS SNAPSHOT
Visit Information Date & Time Provider Department Dept. Phone Encounter #  
 9/1/2017  9:45 AM [facility] NURSE Marin Metz [id number] Upcoming Health Maintenance Date Due  
 MEDICARE YEARLY EXAM 7/29/2018 GLAUCOMA SCREENING Q2Y 7/24/2019 BREAST CANCER SCRN MAMMOGRAM 8/25/2019 COLONOSCOPY 2/21/2020 DTaP/Tdap/Td series (2 - Td) 5/10/2023 Allergies as of 9/1/2017  Review Complete On: 8/25/2017 By: Janny Conteh Severity Noted Reaction Type Reactions Shellfish Derived  06/19/2015    Nausea and Vomiting Mullusks Sulfa (Sulfonamide Antibiotics)  11/12/2013    Other (comments) Sulfur Low 01/26/2015   Side Effect Hives Current Immunizations  Reviewed on 7/28/2017 Name Date Influenza High Dose Vaccine PF 8/31/2017  8:54 AM, 11/17/2016  4:41 PM  
 Influenza Vaccine 12/7/2015 Pneumococcal Conjugate (PCV-13) 5/18/2015 Pneumococcal Polysaccharide (PPSV-23) 5/18/2016  1:52 PM  
 Tdap 5/10/2013 Not reviewed this visit Vitals OB Status Smoking Status Postmenopausal Never Smoker Your Updated Medication List  
  
   
This list is accurate as of: 9/1/17 10:17 AM.  Always use your most recent med list.  
  
  
  
  
 fluticasone 50 mcg/actuation nasal spray Commonly known as:  FLONASE  
USE 2 SPRAYS IN EACH NOSTRIL EVERY DAY  
  
 losartan-hydroCHLOROthiazide 50-12.5 mg per tablet Commonly known as:  HYZAAR  
TAKE 1 TABLET BY MOUTH EVERY DAY [facility]! Dear Elie Reasoner: 
Thank you for requesting a The Mother List account. Our records indicate that you already have an active The Mother List account. You can access your account anytime at [email or web address] Did you know that you can access your hospital and ER discharge instructions at any time in The Mother List? You can also review all of your test results from your hospital stay or ER visit. Additional Information If you have questions, please visit the Frequently Asked Questions section of the [employer] website at [email or web address] Remember, [email or web address] is NOT to be used for urgent needs. For medical emergencies, dial 911. Now available from your iPhone and Android! Please provide this summary of care documentation to your next provider. Your primary care clinician is listed as Aubrie Vargas. If you have any questions after today's visit, please call [phone].

## 2017-10-31 ENCOUNTER — TELEPHONE (OUTPATIENT)
Dept: FAMILY MEDICINE CLINIC | Age: 67
End: 2017-10-31

## 2017-10-31 NOTE — TELEPHONE ENCOUNTER
Spoke with patient, having abdominal pain, out of the ordinary symptoms, having GYN work up, concerned, scheduled.

## 2017-11-01 ENCOUNTER — TELEPHONE (OUTPATIENT)
Dept: FAMILY MEDICINE CLINIC | Age: 67
End: 2017-11-01

## 2017-11-01 NOTE — TELEPHONE ENCOUNTER
Spoke with patient, saw GYN this morning, ordering an US of pelvis ordered, not in pain now, UA also checked, will cancel today's appointmnet

## 2017-11-01 NOTE — TELEPHONE ENCOUNTER
[phone] contact # sarah Molina, please call this morning and I'll tell you what has happened and I may not need the appt this afternoon @ 2:30pm w/Reina.   Thanks,

## 2018-04-03 ENCOUNTER — TELEPHONE (OUTPATIENT)
Dept: FAMILY MEDICINE CLINIC | Age: 68
End: 2018-04-03

## 2018-04-03 ENCOUNTER — OFFICE VISIT (OUTPATIENT)
Dept: FAMILY MEDICINE CLINIC | Age: 68
End: 2018-04-03

## 2018-04-03 VITALS
RESPIRATION RATE: 16 BRPM | WEIGHT: 124.8 LBS | DIASTOLIC BLOOD PRESSURE: 80 MMHG | HEIGHT: 65 IN | OXYGEN SATURATION: 94 % | BODY MASS INDEX: 20.79 KG/M2 | HEART RATE: 93 BPM | SYSTOLIC BLOOD PRESSURE: 118 MMHG

## 2018-04-03 DIAGNOSIS — L57.0 ACTINIC KERATOSES: Primary | ICD-10-CM

## 2018-04-03 NOTE — MR AVS SNAPSHOT
[address] [facility] [address] 
 
 
 [address] 
[phone] Patient: Taylor Farah May MRN: [id number] SSY:[id number] Visit Information Date & Time Provider Department Dept. Phone Encounter #  
 4/3/2018  1:00 PM Kamala Brown [phone] [id number] Your Appointments 4/3/2018  1:00 PM  
ESTABLISHED PATIENT with MD Kamala Brown 71 [facility] Appt Note: Double book on double book per Regina Coy RN/suspicious lesion on back [address] [facility] [address]  
[address] [facility] [address] [facility] [address] Upcoming Health Maintenance Date Due  
 MEDICARE YEARLY EXAM 7/29/2018 GLAUCOMA SCREENING Q2Y 7/24/2019 BREAST CANCER SCRN MAMMOGRAM 8/25/2019 COLONOSCOPY 2/21/2020 DTaP/Tdap/Td series (2 - Td) 5/10/2023 Allergies as of 4/3/2018  Review Complete On: 8/25/2017 By: Kenyetta Wallace Severity Noted Reaction Type Reactions Shellfish Derived  06/19/2015    Nausea and Vomiting Mullusks Sulfa (Sulfonamide Antibiotics)  11/12/2013    Other (comments) Sulfur Low 01/26/2015   Side Effect Hives Current Immunizations  Reviewed on 7/28/2017 Name Date Influenza High Dose Vaccine PF 8/31/2017  8:54 AM, 11/17/2016  4:41 PM  
 Influenza Vaccine 12/7/2015 Pneumococcal Conjugate (PCV-13) 5/18/2015 Pneumococcal Polysaccharide (PPSV-23) 5/18/2016  1:52 PM  
 Tdap 5/10/2013 Not reviewed this visit You Were Diagnosed With   
  
 Codes Comments Actinic keratoses    -  Primary ICD-10-CM: L57.0 ICD-9-CM: 702.0 Vitals BP Pulse Resp Height(growth percentile) Weight(growth percentile) SpO2  
 118/80 (BP 1 Location: Left arm, BP Patient Position: Sitting) 93 16 5' 5\" (1.651 m) 124 lb 12.8 oz (56.6 kg) 94% BMI OB Status Smoking Status 20.77 kg/m2 Postmenopausal Never Smoker BMI and BSA Data Body Mass Index Body Surface Area 20.77 kg/m 2 1.61 m 2 Preferred Pharmacy Pharmacy Name [facility], [address] AT [facility] [address] JEFF Mcgraw [phone] Your Updated Medication List  
  
   
This list is accurate as of 4/3/18 11:51 AM.  Always use your most recent med list.  
  
  
  
  
 fluticasone 50 mcg/actuation nasal spray Commonly known as:  FLONASE  
USE 2 SPRAYS IN EACH NOSTRIL EVERY DAY  
  
 losartan-hydroCHLOROthiazide 50-12.5 mg per tablet Commonly known as:  HYZAAR  
TAKE 1 TABLET BY MOUTH EVERY DAY Introducing [facility]! Dear Gali Saleem: 
Thank you for requesting a Timely Network account. Our records indicate that you already have an active Timely Network account. You can access your account anytime at [email or web address] Did you know that you can access your hospital and ER discharge instructions at any time in Timely Network? You can also review all of your test results from your hospital stay or ER visit. Additional Information If you have questions, please visit the Frequently Asked Questions section of the Timely Network website at [email or web address] Remember, Timely Network is NOT to be used for urgent needs. For medical emergencies, dial 911. Now available from your iPhone and Android! Please provide this summary of care documentation to your next provider. Your primary care clinician is listed as Kristie Butler. If you have any questions after today's visit, please call [phone].

## 2018-04-03 NOTE — PROGRESS NOTES
Chief Complaint   Patient presents with    Lesion     on back, color change         HPI:         is a 75 y.o. female who notes the onset of a skin problem. The details are as follows:  Prior MOHS surgery to nasal bridge. Has noted some scaly red skin on her nose and back      Allergies   Allergen Reactions    Shellfish Derived Nausea and Vomiting     Mullusks    Sulfa (Sulfonamide Antibiotics) Other (comments)    Sulfur Hives       Current Outpatient Prescriptions   Medication Sig    fluticasone (FLONASE) 50 mcg/actuation nasal spray USE 2 SPRAYS IN EACH NOSTRIL EVERY DAY    losartan-hydroCHLOROthiazide (HYZAAR) 50-12.5 mg per tablet TAKE 1 TABLET BY MOUTH EVERY DAY     No current facility-administered medications for this visit. Past Medical History:   Diagnosis Date    Basal cell carcinoma     excised from nose and clavicl    Cerumen impaction     Hypertension     Menopause     age 47    Nystagmus     OA (osteoarthritis) of finger     Peripheral sensory neuropathy age 46    mild    Thyroid nodule     UTI (lower urinary tract infection)     recurrent         ROS:  Denies fever, chills, cough, chest pain, SOB,  nausea, vomiting, or diarrhea. Denies wt loss, wt gain, hemoptysis, hematochezia or melena. Physical Examination:    Visit Vitals    /80 (BP 1 Location: Left arm, BP Patient Position: Sitting)    Pulse 93    Resp 16    Ht 5' 5\" (1.651 m)    Wt 124 lb 12.8 oz (56.6 kg)    SpO2 94%    BMI 20.77 kg/m2      General:  Alert, cooperative, no distress. Head:  Normocephalic, without obvious abnormality, atraumatic. Eyes:  Conjunctivae/corneas clear. Pupils equal, round, reactive to light. Chest wall:  No tenderness or deformity. Extremities: Extremities normal, atraumatic, no cyanosis or edema. Skin:  Scattered Seb k's on her back. Multiple AK's on her nose.              Lymph nodes: Cervical and supraclavicular nodes are normal.   Neurologic: Moves all extremities, fluent speech       Procedure Note:  Cryotherapy for the treatment of Actinic Keratoses    The risks, benefits and alternatives to treatment were carefully explained to the patient who voiced understanding and  desires to proceed. With the patient's verbal permission, 3 actinic keratoses were treated with 2 applications of Liquid Nitrogen each. The patient tolerated the procedure well and there were no complications. The patient was instructed to RTC for follow up if redness, swelling or new lesions emerge. ASSESSMENT AND PLAN:    1. Seborrheic Keratoses:  Monitor  2. Actinic Keratoses:  Treated today with LN2. Urged her to schedule an appt with Derm group in [place] ([address] ).     Orders Placed This Encounter   Mitchell Singleton, [facility]    [address] [facility]       Sher Guerra MD, Carmelita Amaya

## 2018-04-03 NOTE — TELEPHONE ENCOUNTER
Patient concerned about a suspicious spot on her back that is slightly raised, told to come to office this morning

## 2018-04-03 NOTE — PROGRESS NOTES
1. Have you been to the ER, urgent care clinic since your last visit? Hospitalized since your last visit? No        2. Have you seen or consulted any other health care providers outside of the [address] [place] [address] since your last visit? Include any pap smears or colon screening.  No

## 2018-04-26 ENCOUNTER — OFFICE VISIT (OUTPATIENT)
Dept: FAMILY MEDICINE CLINIC | Age: 68
End: 2018-04-26

## 2018-04-26 VITALS
SYSTOLIC BLOOD PRESSURE: 100 MMHG | BODY MASS INDEX: 20.93 KG/M2 | HEIGHT: 65 IN | RESPIRATION RATE: 12 BRPM | DIASTOLIC BLOOD PRESSURE: 60 MMHG | HEART RATE: 82 BPM | WEIGHT: 125.6 LBS | OXYGEN SATURATION: 98 % | TEMPERATURE: 97.6 F

## 2018-04-26 DIAGNOSIS — L23.7 PLANT ALLERGIC CONTACT DERMATITIS: Primary | ICD-10-CM

## 2018-04-26 RX ORDER — METHYLPREDNISOLONE ACETATE 40 MG/ML
40 INJECTION, SUSPENSION INTRA-ARTICULAR; INTRALESIONAL; INTRAMUSCULAR; SOFT TISSUE ONCE
Qty: 1 VIAL | Refills: 0
Start: 2018-04-26 | End: 2018-04-26

## 2018-04-26 NOTE — PROGRESS NOTES
Chief Complaint   Patient presents with    Rash         HPI:       is a 70 y.o. female. She has chronic nystagmus as a child. New Issues:  She has a rash on both arms after working in the yard Sunday. Has used Benadryl cream at home. Allergies   Allergen Reactions    Shellfish Derived Nausea and Vomiting     Mullusks    Sulfa (Sulfonamide Antibiotics) Other (comments)    Sulfur Hives       Current Outpatient Prescriptions   Medication Sig    losartan-hydroCHLOROthiazide (HYZAAR) 50-12.5 mg per tablet TAKE 1 TABLET BY MOUTH EVERY DAY     No current facility-administered medications for this visit.         Past Medical History:   Diagnosis Date    Basal cell carcinoma     excised from nose and clavicl    Cerumen impaction     Hypertension     Menopause     age 47    Nystagmus     OA (osteoarthritis) of finger     Peripheral sensory neuropathy age 46    mild    Thyroid nodule     UTI (lower urinary tract infection)     recurrent       Past Surgical History:   Procedure Laterality Date    HX CATARACT REMOVAL Bilateral     lens implants    HX COLONOSCOPY  2008    normal    URETHRLYS, TRANSVAG W/ SCOPE      dilation       Social History     Social History    Marital status:      Spouse name: N/A    Number of children: 3    Years of education: N/A     Social History Main Topics    Smoking status: Never Smoker    Smokeless tobacco: Never Used    Alcohol use Yes    Drug use: None    Sexual activity: Not Asked     Other Topics Concern     Service No    Blood Transfusions No    Caffeine Concern No    Occupational Exposure No    Hobby Hazards No    Sleep Concern No    Stress Concern No    Weight Concern No    Special Diet No    Back Care No    Exercise Yes     active    Bike Helmet No    Seat Belt Yes    Self-Exams Yes     Social History Narrative    eriph       Family History   Problem Relation Age of Onset    Breast Cancer Mother     Thyroid Cancer Sister     Breast Cancer Sister     Diabetes Sister      Peripheral neuropathy    Cancer Sister      Breast, CLL       Above history reviewed. ROS:  Denies fever, chills, cough, chest pain, SOB,  nausea, vomiting, or diarrhea. Denies wt loss, wt gain, hemoptysis, hematochezia or melena. Physical Examination:    /60 (BP 1 Location: Left arm, BP Patient Position: Sitting)  Pulse 82  Temp 97.6 °F (36.4 °C) (Temporal)   Resp 12  Ht 5' 5\" (1.651 m)  Wt 125 lb 9.6 oz (57 kg)  SpO2 98%  BMI 20.9 kg/m2    General: Alert and Ox3, Fluent speech  Neck:  Supple, no adenopathy, JVD, mass or bruit  Chest:  Clear to Ausculation, without wheezes, rales, rubs or ronchi  Cardiac: RRR  Extremities:  No cyanosis, clubbing or edema  Neurologic:  Ambulatory without assist, CN 2-12 grossly intact. Moves all extremities. Skin: Bilateral forearms with erythematous, fluid filled papules in clusters and linear pattern. Lymphadenopathy: no cervical or supraclavicular nodes    ASSESSMENT AND PLAN:     1. Plant allergic contact dermatitis  Continue Benadryl cream and PO Benadryl   Consider OTC hydrocortisone as well. - METHYLPREDNISOLONE ACETATE INJECTION 40 MG  - TN THER/PROPH/DIAG INJECTION, SUBCUT/IM  - methylPREDNISolone acetate (DEPO-MEDROL) 40 mg/mL injection; 1 mL by IntraMUSCular route once for 1 dose.   Dispense: 1 Vial; Refill: 0     RTC PRN    Ze Carias NP

## 2018-04-26 NOTE — PROGRESS NOTES
1. Have you been to the ER, urgent care clinic since your last visit? Hospitalized since your last visit? No    2. Have you seen or consulted any other health care providers outside of the [address] [place] [address] since your last visit? Include any pap smears or colon screening.  No

## 2018-04-26 NOTE — PATIENT INSTRUCTIONS
Poison [place], [place], and [place]: Care Instructions  Your Care Instructions    Poison ivy, poison oak, and poison sumac are plants that can cause a skin rash upon contact. The red, itchy rash often shows up in lines or streaks and may cause fluid-filled blisters or large, raised hives. The rash is caused by an allergic reaction to an oil in poison ivy, oak, and sumac. The rash may occur when you touch the plant or when you touch clothing, pet fur, sporting gear, gardening tools, or other objects that have come in contact with one of these plants. You cannot catch or spread the rash, even if you touch it or the blister fluid, because the plant oil will already have been absorbed or washed off the skin. The rash may seem to be spreading, but either it is still developing from earlier contact or you have touched something that still has the plant oil on it. Follow-up care is a key part of your treatment and safety. Be sure to make and go to all appointments, and call your doctor if you are having problems. It's also a good idea to know your test results and keep a list of the medicines you take. How can you care for yourself at home? · If your doctor prescribed a cream, use it as directed. If your doctor prescribed medicine, take it exactly as prescribed. Call your doctor if you think you are having a problem with your medicine. · Use cold, wet cloths to reduce itching. · Keep cool, and stay out of the sun. · Leave the rash open to the air. · Wash all clothing or other things that may have come in contact with the plant oil. · Avoid most lotions and ointments until the rash heals. Calamine lotion may help relieve symptoms of a plant rash. Use it 3 or 4 times a day. To prevent poison ivy exposure  If you know that you will be near poison ivy, oak, or sumac, you can try these options:  · Use a product designed to help prevent plant oil from getting on the skin.  These products, such as Ivy X Pre-Contact Skin Solution, come in lotions, sprays, or towelettes. You put the product on your skin right before you go outdoors. · If you did not use a preventive product and you have had contact with plant oil, clean it off your skin as soon as possible. Use a product such as Tecnu Original Outdoor Skin Cleanser. These products can also be used to clean plant oil from clothing or tools. When should you call for help? Call your doctor now or seek immediate medical care if:  ? · Your rash gets worse, and you start to feel bad and have a fever, a stiff neck, nausea, and vomiting. ? · You have signs of infection, such as:  ¨ Increased pain, swelling, warmth, or redness. ¨ Red streaks leading from the rash. ¨ Pus draining from the rash. ¨ A fever. ? Watch closely for changes in your health, and be sure to contact your doctor if:  ? · You have new blisters or bruises, or the rash spreads and looks like a sunburn. ? · The rash gets worse, or it comes back after nearly disappearing. ? · You think a medicine you are using is making your rash worse. ? · Your rash does not clear up after 1 to 2 weeks of home treatment. ? · You have joint aches or body aches with your rash. Where can you learn more? Go to http://mariann-debbie.info/. Enter R908 in the search box to learn more about \"Poison [place], [place], and [place]: Care Instructions. \"  Current as of: October 13, 2016  Content Version: 11.4  © 2589-7140 [employer]. Care instructions adapted under license by [employer] (which disclaims liability or warranty for this information). If you have questions about a medical condition or this instruction, always ask your healthcare professional. [place] [address] any warranty or liability for your use of this information.

## 2018-04-26 NOTE — MR AVS SNAPSHOT
[address] [facility] [phone] Patient: Xenia Jones MRN: [id number] Visit Information Date & Time Provider Department Dept. Phone Encounter #  
 4/26/2018  8:00 AM Krunal Veras NP [facility] 38 [phone] [id number] Follow-up Instructions Return if symptoms worsen or fail to improve. Follow-up and Disposition History Upcoming Health Maintenance Date Due  
 MEDICARE YEARLY EXAM 7/29/2018 GLAUCOMA SCREENING Q2Y 7/24/2019 BREAST CANCER SCRN MAMMOGRAM 8/25/2019 COLONOSCOPY 2/21/2020 DTaP/Tdap/Td series (2 - Td) 5/10/2023 Allergies as of 4/26/2018  Review Complete On: 4/26/2018 By: Krunal Veras NP Severity Noted Reaction Type Reactions Shellfish Derived  06/19/2015    Nausea and Vomiting Mullusks Sulfa (Sulfonamide Antibiotics)  11/12/2013    Other (comments) Sulfur Low 01/26/2015   Side Effect Hives Current Immunizations  Reviewed on 7/28/2017 Name Date Influenza High Dose Vaccine PF 8/31/2017  8:54 AM, 11/17/2016  4:41 PM  
 Influenza Vaccine 12/7/2015 Pneumococcal Conjugate (PCV-13) 5/18/2015 Pneumococcal Polysaccharide (PPSV-23) 5/18/2016  1:52 PM  
 Tdap 5/10/2013 Not reviewed this visit You Were Diagnosed With   
  
 Codes Comments Plant allergic contact dermatitis    -  Primary ICD-10-CM: L23.7 ICD-9-CM: 692.6 Vitals BP Pulse Temp Resp Height(growth percentile) 100/60 (BP 1 Location: Left arm, BP Patient Position: Sitting) 82 97.6 °F (36.4 °C) (Temporal) 12 5' 5\" (1.651 m) Weight(growth percentile) SpO2 BMI OB Status Smoking Status 125 lb 9.6 oz (57 kg) 98% 20.9 kg/m2 Postmenopausal Never Smoker BMI and BSA Data Body Mass Index Body Surface Area  
 20.9 kg/m 2 1.62 m 2 Preferred Pharmacy Pharmacy Name Phone [facility] , [address] [facility] [address] [place] [phone] Your Updated Medication List  
  
   
This list is accurate as of 4/26/18  9:14 AM.  Always use your most recent med list.  
  
  
  
  
 losartan-hydroCHLOROthiazide 50-12.5 mg per tablet Commonly known as:  HYZAAR  
TAKE 1 TABLET BY MOUTH EVERY DAY  
  
 methylPREDNISolone acetate 40 mg/mL injection Commonly known as:  DEPO-MEDROL 1 mL by IntraMUSCular route once for 1 dose. We Performed the Following METHYLPREDNISOLONE ACETATE INJECTION 40 MG [ [facility] [place]] IL THER/PROPH/DIAG INJECTION, SUBCUT/IM [id number] CPT(R)] Follow-up Instructions Return if symptoms worsen or fail to improve. Patient Instructions Poison [place], [place], and [place]: Care Instructions Your Care Instructions Poison ivy, poison oak, and poison sumac are plants that can cause a skin rash upon contact. The red, itchy rash often shows up in lines or streaks and may cause fluid-filled blisters or large, raised hives. The rash is caused by an allergic reaction to an oil in poison ivy, oak, and sumac. The rash may occur when you touch the plant or when you touch clothing, pet fur, sporting gear, gardening tools, or other objects that have come in contact with one of these plants. You cannot catch or spread the rash, even if you touch it or the blister fluid, because the plant oil will already have been absorbed or washed off the skin. The rash may seem to be spreading, but either it is still developing from earlier contact or you have touched something that still has the plant oil on it. Follow-up care is a key part of your treatment and safety. Be sure to make and go to all appointments, and call your doctor if you are having problems. It's also a good idea to know your test results and keep a list of the medicines you take. How can you care for yourself at home? · If your doctor prescribed a cream, use it as directed. If your doctor prescribed medicine, take it exactly as prescribed. Call your doctor if you think you are having a problem with your medicine. · Use cold, wet cloths to reduce itching. · Keep cool, and stay out of the sun. · Leave the rash open to the air. · Wash all clothing or other things that may have come in contact with the plant oil. · Avoid most lotions and ointments until the rash heals. Calamine lotion may help relieve symptoms of a plant rash. Use it 3 or 4 times a day. To prevent poison ivy exposure If you know that you will be near poison ivy, oak, or sumac, you can try these options: · Use a product designed to help prevent plant oil from getting on the skin. These products, such as Ivy X Pre-Contact Skin Solution, come in lotions, sprays, or towelettes. You put the product on your skin right before you go outdoors. · If you did not use a preventive product and you have had contact with plant oil, clean it off your skin as soon as possible. Use a product such as Tecnu Original Outdoor Skin Cleanser. These products can also be used to clean plant oil from clothing or tools. When should you call for help? Call your doctor now or seek immediate medical care if: 
? · Your rash gets worse, and you start to feel bad and have a fever, a stiff neck, nausea, and vomiting. ? · You have signs of infection, such as: 
¨ Increased pain, swelling, warmth, or redness. ¨ Red streaks leading from the rash. ¨ Pus draining from the rash. ¨ A fever. ? Watch closely for changes in your health, and be sure to contact your doctor if: 
? · You have new blisters or bruises, or the rash spreads and looks like a sunburn. ? · The rash gets worse, or it comes back after nearly disappearing. ? · You think a medicine you are using is making your rash worse. ? · Your rash does not clear up after 1 to 2 weeks of home treatment. ? · You have joint aches or body aches with your rash. Where can you learn more? Go to http://mariann-debbie.info/. Enter S204 in the search box to learn more about \"Poison [place], [place], and [place]: Care Instructions. \" Current as of: October 13, 2016 Content Version: 11.4 © 6423-6032 [employer]. Care instructions adapted under license by [employer] (which disclaims liability or warranty for this information). If you have questions about a medical condition or this instruction, always ask your healthcare professional. [place] [address] any warranty or liability for your use of this information. Introducing [facility] & HEALTH SERVICES! Dear Tika Miller: 
Thank you for requesting a [employer] account. Our records indicate that you already have an active [employer] account. You can access your account anytime at [email or web address] Did you know that you can access your hospital and ER discharge instructions at any time in [employer]? You can also review all of your test results from your hospital stay or ER visit. Additional Information If you have questions, please visit the Frequently Asked Questions section of the [employer] website at [email or web address] Remember, [employer] is NOT to be used for urgent needs. For medical emergencies, dial 911. Now available from your iPhone and Android! Please provide this summary of care documentation to your next provider. Your primary care clinician is listed as Gino Schultz. If you have any questions after today's visit, please call [phone].

## 2018-05-15 RX ORDER — FLUTICASONE PROPIONATE 50 MCG
SPRAY, SUSPENSION (ML) NASAL
Qty: 48 G | Refills: 10 | Status: SHIPPED | OUTPATIENT
Start: 2018-05-15 | End: 2018-07-17

## 2018-05-21 ENCOUNTER — TELEPHONE (OUTPATIENT)
Dept: FAMILY MEDICINE CLINIC | Age: 68
End: 2018-05-21

## 2018-05-21 NOTE — TELEPHONE ENCOUNTER
Is having an issue with 2 of her toes. Thinks its a fungus.  Questions if she need to see foot doctor or Dr. Chad Adler

## 2018-05-21 NOTE — TELEPHONE ENCOUNTER
More than just the nail, discolored under the nail, that is what she first noticed. Since she has some neuropathy she would like to see a Podiatrist, will try the OTC treatment and make an appointment with Dr. Princess Edgar or Latisha Sethi. Discussed that nail fungus is a persistent treatment for up to a year.

## 2018-07-17 ENCOUNTER — OFFICE VISIT (OUTPATIENT)
Dept: FAMILY MEDICINE CLINIC | Age: 68
End: 2018-07-17

## 2018-07-17 VITALS
OXYGEN SATURATION: 98 % | DIASTOLIC BLOOD PRESSURE: 64 MMHG | BODY MASS INDEX: 20.69 KG/M2 | SYSTOLIC BLOOD PRESSURE: 106 MMHG | WEIGHT: 124.2 LBS | HEIGHT: 65 IN | HEART RATE: 74 BPM | RESPIRATION RATE: 16 BRPM

## 2018-07-17 DIAGNOSIS — Z00.00 MEDICARE ANNUAL WELLNESS VISIT, SUBSEQUENT: Primary | ICD-10-CM

## 2018-07-17 DIAGNOSIS — I10 ESSENTIAL HYPERTENSION: ICD-10-CM

## 2018-07-17 NOTE — MR AVS SNAPSHOT
[address] [place] [address] 
[phone] Patient: Fransico Jones MRN: [id number] KKI:4/37/1864 Visit Information Date & Time Provider Department Dept. Phone Encounter #  
 7/17/2018  9:30 AM Saji NolenKamala 72 [phone] [id number] Upcoming Health Maintenance Date Due  
 MEDICARE YEARLY EXAM 7/29/2018 Influenza Age 5 to Adult 8/1/2018 GLAUCOMA SCREENING Q2Y 7/24/2019 BREAST CANCER SCRN MAMMOGRAM 8/25/2019 COLONOSCOPY 2/21/2020 DTaP/Tdap/Td series (2 - Td) 5/10/2023 Allergies as of 7/17/2018  Review Complete On: 7/17/2018 By: Saji Nolen MD  
  
 Severity Noted Reaction Type Reactions Shellfish Derived  06/19/2015    Nausea and Vomiting Mullusks Sulfa (Sulfonamide Antibiotics)  11/12/2013    Other (comments) Sulfur Low 01/26/2015   Side Effect Hives Current Immunizations  Reviewed on 7/28/2017 Name Date Influenza High Dose Vaccine PF 8/31/2017  8:54 AM, 11/17/2016  4:41 PM  
 Influenza Vaccine 12/7/2015 Pneumococcal Conjugate (PCV-13) 5/18/2015 Pneumococcal Polysaccharide (PPSV-23) 5/18/2016  1:52 PM  
 Tdap 5/10/2013 Not reviewed this visit You Were Diagnosed With   
  
 Codes Comments Medicare annual wellness visit, subsequent    -  Primary ICD-10-CM: Z00.00 ICD-9-CM: V70.0 Essential hypertension     ICD-10-CM: I10 
ICD-9-CM: 401.9 Vitals BP Pulse Resp Height(growth percentile) Weight(growth percentile) SpO2  
 106/64 (BP 1 Location: Left arm, BP Patient Position: Sitting) 74 16 5' 5\" (1.651 m) 124 lb 3.2 oz (56.3 kg) 98% BMI OB Status Smoking Status 20.67 kg/m2 Postmenopausal Never Smoker BMI and BSA Data Body Mass Index Body Surface Area  
 20.67 kg/m 2 1.61 m 2 Preferred Pharmacy Pharmacy Name Phone [facility] , [address] [facility] [address] [facility] [phone] Your Updated Medication List  
  
   
This list is accurate as of 7/17/18 10:05 AM.  Always use your most recent med list.  
  
  
  
  
 losartan-hydroCHLOROthiazide 50-12.5 mg per tablet Commonly known as:  HYZAAR  
TAKE 1 TABLET BY MOUTH EVERY DAY We Performed the Following CBC WITH AUTOMATED DIFF [24344 CPT(R)] LIPID PANEL [92042 CPT(R)] METABOLIC PANEL, COMPREHENSIVE [44372 CPT(R)] TSH 3RD GENERATION [09006 CPT(R)] Introducing [facility] & HEALTH SERVICES! Dear Adán Coker: 
Thank you for requesting a [employer] account. Our records indicate that you already have an active [employer] account. You can access your account anytime at [email or web address] Did you know that you can access your hospital and ER discharge instructions at any time in [employer]? You can also review all of your test results from your hospital stay or ER visit. Additional Information If you have questions, please visit the Frequently Asked Questions section of the [employer] website at [email or web address] Remember, [employer] is NOT to be used for urgent needs. For medical emergencies, dial 911. Now available from your iPhone and Android! Please provide this summary of care documentation to your next provider. Your primary care clinician is listed as Armando Kinney. If you have any questions after today's visit, please call [phone].

## 2018-07-17 NOTE — PROGRESS NOTES
Chief Complaint   Patient presents with    Annual Wellness Visit         HPI:       is a 70 y.o. female. Retired homemaker. History of UTI, HTN, thyroid nodule,OA, and neuropathy. BP remains well controlled with meds. No side effects. Due for SAWV. Allergies   Allergen Reactions    Shellfish Derived Nausea and Vomiting     Mullusks    Sulfa (Sulfonamide Antibiotics) Other (comments)    Sulfur Hives       Current Outpatient Prescriptions   Medication Sig    losartan-hydroCHLOROthiazide (HYZAAR) 50-12.5 mg per tablet TAKE 1 TABLET BY MOUTH EVERY DAY     No current facility-administered medications for this visit. Past Medical History:   Diagnosis Date    Basal cell carcinoma     excised from nose and clavicl    Cerumen impaction     Hypertension     Menopause     age 47    Nystagmus     OA (osteoarthritis) of finger     Peripheral sensory neuropathy age 46    mild    Thyroid nodule     UTI (lower urinary tract infection)     recurrent         ROS:  Denies fever, chills, cough, chest pain, SOB,  nausea, vomiting, or diarrhea. Denies wt loss, wt gain, hemoptysis, hematochezia or melena. Physical Examination:    /64 (BP 1 Location: Left arm, BP Patient Position: Sitting)  Pulse 74  Resp 16  Ht 5' 5\" (1.651 m)  Wt 124 lb 3.2 oz (56.3 kg)  SpO2 98%  BMI 20.67 kg/m2    General: Alert and Ox3, Fluent speech  HEENT:  NC/AT, EOMI, OP: clear  Neck:  Supple, no adenopathy, JVD, mass or bruit  Chest:  Clear to Ausculation, without wheezes, rales, rubs or ronchi  Cardiac: RRR  Abdomen:  +BS, soft, nontender without palpable HSM  Extremities:  No cyanosis, clubbing or edema  Neurologic:  Ambulatory without assist, CN 2-12 grossly intact. Moves all extremities. Skin: no rash  Lymphadenopathy: no cervical or supraclavicular nodes      ASSESSMENT AND PLAN:     1. Well controlled HTN: continue current meds.   2.  SAWV    No orders of the defined types were placed in this encounter. Daniel Norris MD, FACP        ______________________________________________________________________    Donna Jones is a 70 y.o. female and presents for annual Medicare Wellness Visit. Problem List: Reviewed with patient and discussed risk factors. Patient Active Problem List   Diagnosis Code    Nonspecific abnormal finding in stool contents R19.5    Essential hypertension I10    Actinic keratoses L57.0    UTI (lower urinary tract infection) N39.0       Current medical providers:  Patient Care Team:  Herman Montalvo MD as PCP - General (Internal Medicine)  Merlin Smiling, MD (General Surgery)    [facility], [address] [place] [address], Medications/Allergies: reviewed, on chart. Female Alcohol Screening: On any occasion during the past 3 months, have you had more than 3 drinks containing alcohol? No    Do you average more than 7 drinks per week? No    ROS:  Constitutional: No fever, chills or weight loss  Respiratory: No cough, SOB   CV: No chest pain or Palpitations    Objective:  Visit Vitals    /64 (BP 1 Location: Left arm, BP Patient Position: Sitting)    Pulse 74    Resp 16    Ht 5' 5\" (1.651 m)    Wt 124 lb 3.2 oz (56.3 kg)    SpO2 98%    BMI 20.67 kg/m2    Body mass index is 20.67 kg/(m^2). Assessment of cognitive impairment: Alert and oriented x 3    Depression Screen:   PHQ over the last two weeks 4/26/2018   PHQ Not Done -   Little interest or pleasure in doing things Not at all   Feeling down, depressed or hopeless Not at all   Total Score PHQ 2 0       Fall Risk Assessment:    Fall Risk Assessment, last 12 mths 4/26/2018   Able to walk? Yes   Fall in past 12 months? No       Functional Ability:   Does the patient exhibit a steady gait? yes   How long did it take the patient to get up and walk from a sitting position? 12 sec   Is the patient self reliant?  (ie can do own laundry, meals, household chores)  yes     Does the patient handle his/her own medications?   yes     Does the patient handle his/her own money? yes     Is the patients home safe (ie good lighting, handrails on stairs and bath, etc.)? yes     Did you notice or did patient express any hearing difficulties? yes     Did you notice or did patient express any vision difficulties? no       Advance Care Planning:   Patient was offered the opportunity to discuss advance care planning:  yes     Does patient have an Advance Directive:  yes   If no, did you provide information on Caring Connections?  no       Plan:      No orders of the defined types were placed in this encounter. Health Maintenance   Topic Date Due    MEDICARE YEARLY EXAM  07/29/2018    Influenza Age 5 to Adult  08/01/2018    GLAUCOMA SCREENING Q2Y  07/24/2019    BREAST CANCER SCRN MAMMOGRAM  08/25/2019    COLONOSCOPY  02/21/2020    DTaP/Tdap/Td series (2 - Td) 05/10/2023    Hepatitis C Screening  Completed    Bone Densitometry (Dexa) Screening  Completed    ZOSTER VACCINE AGE 60>  Addressed    Pneumococcal 65+ Low/Medium Risk  Completed       *Patient verbalized understanding and agreement with the plan. A copy of the After Visit Summary with personalized health plan was given to the patient today.

## 2018-07-18 LAB
ALBUMIN SERPL-MCNC: 4.4 G/DL (ref 3.6–4.8)
ALBUMIN/GLOB SERPL: 2 {RATIO} (ref 1.2–2.2)
ALP SERPL-CCNC: 81 IU/L (ref 39–117)
ALT SERPL-CCNC: 21 IU/L (ref 0–32)
AST SERPL-CCNC: 20 IU/L (ref 0–40)
BASOPHILS # BLD AUTO: 0 X10E3/UL (ref 0–0.2)
BASOPHILS NFR BLD AUTO: 0 %
BILIRUB SERPL-MCNC: 0.5 MG/DL (ref 0–1.2)
BUN SERPL-MCNC: 18 MG/DL (ref 8–27)
BUN/CREAT SERPL: 32 (ref 12–28)
CALCIUM SERPL-MCNC: 9.7 MG/DL (ref 8.7–10.3)
CHLORIDE SERPL-SCNC: 101 MMOL/L (ref 96–106)
CHOLEST SERPL-MCNC: 165 MG/DL (ref 100–199)
CO2 SERPL-SCNC: 23 MMOL/L (ref 20–29)
CREAT SERPL-MCNC: 0.57 MG/DL (ref 0.57–1)
EOSINOPHIL # BLD AUTO: 0.1 X10E3/UL (ref 0–0.4)
EOSINOPHIL NFR BLD AUTO: 1 %
ERYTHROCYTE [DISTWIDTH] IN BLOOD BY AUTOMATED COUNT: 13.6 % (ref 12.3–15.4)
GLOBULIN SER CALC-MCNC: 2.2 G/DL (ref 1.5–4.5)
GLUCOSE SERPL-MCNC: 97 MG/DL (ref 65–99)
HCT VFR BLD AUTO: 40.1 % (ref 34–46.6)
HDLC SERPL-MCNC: 57 MG/DL
HGB BLD-MCNC: 13.1 G/DL (ref 11.1–15.9)
IMM GRANULOCYTES # BLD: 0 X10E3/UL (ref 0–0.1)
IMM GRANULOCYTES NFR BLD: 0 %
LDLC SERPL CALC-MCNC: 77 MG/DL (ref 0–99)
LYMPHOCYTES # BLD AUTO: 2.1 X10E3/UL (ref 0.7–3.1)
LYMPHOCYTES NFR BLD AUTO: 46 %
MCH RBC QN AUTO: 31.7 PG (ref 26.6–33)
MCHC RBC AUTO-ENTMCNC: 32.7 G/DL (ref 31.5–35.7)
MCV RBC AUTO: 97 FL (ref 79–97)
MONOCYTES # BLD AUTO: 0.5 X10E3/UL (ref 0.1–0.9)
MONOCYTES NFR BLD AUTO: 11 %
NEUTROPHILS # BLD AUTO: 1.9 X10E3/UL (ref 1.4–7)
NEUTROPHILS NFR BLD AUTO: 42 %
PLATELET # BLD AUTO: 211 X10E3/UL (ref 150–379)
POTASSIUM SERPL-SCNC: 4.5 MMOL/L (ref 3.5–5.2)
PROT SERPL-MCNC: 6.6 G/DL (ref 6–8.5)
RBC # BLD AUTO: 4.13 X10E6/UL (ref 3.77–5.28)
SODIUM SERPL-SCNC: 142 MMOL/L (ref 134–144)
TRIGL SERPL-MCNC: 156 MG/DL (ref 0–149)
TSH SERPL DL<=0.005 MIU/L-ACNC: 1.43 UIU/ML (ref 0.45–4.5)
VLDLC SERPL CALC-MCNC: 31 MG/DL (ref 5–40)
WBC # BLD AUTO: 4.6 X10E3/UL (ref 3.4–10.8)

## 2018-07-20 ENCOUNTER — APPOINTMENT (RX ONLY)
Dept: URBAN - METROPOLITAN AREA CLINIC 38 | Facility: CLINIC | Age: 68
Setting detail: DERMATOLOGY
End: 2018-07-20

## 2018-07-20 DIAGNOSIS — L57.0 ACTINIC KERATOSIS: ICD-10-CM

## 2018-07-20 DIAGNOSIS — D22 MELANOCYTIC NEVI: ICD-10-CM

## 2018-07-20 PROBLEM — Z85.828 PERSONAL HISTORY OF OTHER MALIGNANT NEOPLASM OF SKIN: Status: ACTIVE | Noted: 2018-07-20

## 2018-07-20 PROBLEM — D22.72 MELANOCYTIC NEVI OF LEFT LOWER LIMB, INCLUDING HIP: Status: ACTIVE | Noted: 2018-07-20

## 2018-07-20 PROBLEM — D22.5 MELANOCYTIC NEVI OF TRUNK: Status: ACTIVE | Noted: 2018-07-20

## 2018-07-20 PROCEDURE — 17000 DESTRUCT PREMALG LESION: CPT

## 2018-07-20 PROCEDURE — 99213 OFFICE O/P EST LOW 20 MIN: CPT | Mod: 25

## 2019-03-05 ENCOUNTER — APPOINTMENT (RX ONLY)
Dept: URBAN - METROPOLITAN AREA CLINIC 38 | Facility: CLINIC | Age: 69
Setting detail: DERMATOLOGY
End: 2019-03-05

## 2019-03-05 DIAGNOSIS — D22 MELANOCYTIC NEVI: ICD-10-CM

## 2019-03-05 DIAGNOSIS — L57.0 ACTINIC KERATOSIS: ICD-10-CM

## 2019-03-05 PROBLEM — D22.72 MELANOCYTIC NEVI OF LEFT LOWER LIMB, INCLUDING HIP: Status: ACTIVE | Noted: 2019-03-05

## 2019-03-05 PROBLEM — D22.5 MELANOCYTIC NEVI OF TRUNK: Status: ACTIVE | Noted: 2019-03-05

## 2019-03-05 PROCEDURE — 99213 OFFICE O/P EST LOW 20 MIN: CPT | Mod: 25

## 2019-03-05 PROCEDURE — 17000 DESTRUCT PREMALG LESION: CPT

## 2019-03-05 PROCEDURE — 17003 DESTRUCT PREMALG LES 2-14: CPT

## 2019-10-11 ENCOUNTER — APPOINTMENT (RX ONLY)
Dept: URBAN - METROPOLITAN AREA CLINIC 38 | Facility: CLINIC | Age: 69
Setting detail: DERMATOLOGY
End: 2019-10-11

## 2019-10-11 DIAGNOSIS — D22 MELANOCYTIC NEVI: ICD-10-CM

## 2019-10-11 DIAGNOSIS — L57.0 ACTINIC KERATOSIS: ICD-10-CM

## 2019-10-11 PROBLEM — D22.4 MELANOCYTIC NEVI OF SCALP AND NECK: Status: ACTIVE | Noted: 2019-10-11

## 2019-10-11 PROBLEM — D22.5 MELANOCYTIC NEVI OF TRUNK: Status: ACTIVE | Noted: 2019-10-11

## 2019-10-11 PROBLEM — D22.72 MELANOCYTIC NEVI OF LEFT LOWER LIMB, INCLUDING HIP: Status: ACTIVE | Noted: 2019-10-11

## 2019-10-11 PROCEDURE — 17000 DESTRUCT PREMALG LESION: CPT

## 2019-10-11 PROCEDURE — 99213 OFFICE O/P EST LOW 20 MIN: CPT | Mod: 25

## 2019-10-11 PROCEDURE — 17003 DESTRUCT PREMALG LES 2-14: CPT

## 2020-10-28 ENCOUNTER — APPOINTMENT (RX ONLY)
Dept: URBAN - METROPOLITAN AREA CLINIC 38 | Facility: CLINIC | Age: 70
Setting detail: DERMATOLOGY
End: 2020-10-28

## 2020-10-28 DIAGNOSIS — D22 MELANOCYTIC NEVI: ICD-10-CM

## 2020-10-28 PROBLEM — D22.71 MELANOCYTIC NEVI OF RIGHT LOWER LIMB, INCLUDING HIP: Status: ACTIVE | Noted: 2020-10-28

## 2020-10-28 PROBLEM — D22.5 MELANOCYTIC NEVI OF TRUNK: Status: ACTIVE | Noted: 2020-10-28

## 2020-10-28 PROBLEM — D48.5 NEOPLASM OF UNCERTAIN BEHAVIOR OF SKIN: Status: ACTIVE | Noted: 2020-10-28

## 2020-10-28 PROCEDURE — ? ADDITIONAL NOTES

## 2020-10-28 PROCEDURE — 99213 OFFICE O/P EST LOW 20 MIN: CPT | Mod: 25

## 2020-10-28 PROCEDURE — ? BIOPSY BY SHAVE METHOD

## 2020-10-28 PROCEDURE — ? COUNSELING

## 2020-10-28 PROCEDURE — 11102 TANGNTL BX SKIN SINGLE LES: CPT

## 2020-10-28 ASSESSMENT — LOCATION ZONE DERM
LOCATION ZONE: LEG
LOCATION ZONE: TRUNK

## 2020-10-28 ASSESSMENT — LOCATION DETAILED DESCRIPTION DERM
LOCATION DETAILED: RIGHT DISTAL POSTERIOR THIGH
LOCATION DETAILED: LEFT LATERAL SUPERIOR CHEST
LOCATION DETAILED: LEFT INFERIOR MEDIAL UPPER BACK

## 2020-10-28 ASSESSMENT — LOCATION SIMPLE DESCRIPTION DERM
LOCATION SIMPLE: RIGHT POSTERIOR THIGH
LOCATION SIMPLE: CHEST
LOCATION SIMPLE: LEFT UPPER BACK

## 2021-01-01 NOTE — PATIENT INSTRUCTIONS
[place] Discharge Summary      Girl Carlotta Casper is a female infant born on 2022 at 11:43 AM. She weighed 2.44 kg and measured 18.307 in length. Her head circumference was 32.5 cm at birth. Apgars were 9  and 9 . She has been doing well. Maternal Data:     Delivery Type: Vaginal, Spontaneous    Delivery Resuscitation: Suctioning-bulb; Tactile Stimulation  Number of Vessels: 3 Vessels   Cord Events: None  Meconium Stained: None    Estimated Gestational Age: Information for the patient's mother:  Calli Officer [[id number]]   11H2M        Prenatal Labs: Information for the patient's mother:  Calli Officer [[id number]]     Lab Results   Component Value Date/Time    ABO/Rh(D) O POSITIVE 2021 08:25 AM    Antibody screen NEG 2021 08:25 AM           Nursery Course: There is no immunization history for the selected administration types on file for this patient.  Hearing Screen  Hearing Screen: Yes  Left Ear: Pass  Right Ear: Pass  Repeat Hearing Screen Needed: No    Discharge Exam:     Pulse 102, temperature 98 °F (36.7 °C), resp. rate 46, height 0.465 m, weight 2.33 kg, head circumference 32.5 cm. General: healthy-appearing, vigorous infant. Strong cry.   Head: sutures lines are open,fontanelles soft, flat and open  Eyes: sclerae white, pupils equal and reactive, red reflex normal bilaterally  Ears: well-positioned, well-formed pinnae  Nose: clear, normal mucosa  Mouth: Normal tongue, palate intact,  Neck: normal structure  Chest: lungs clear to auscultation, unlabored breathing, no clavicular crepitus  Heart: RRR, S1 S2, no murmurs  Abd: Soft, non-tender, no masses, no HSM, nondistended, umbilical stump clean and dry  Pulses: strong equal femoral pulses, brisk capillary refill  Hips: Negative Han, Ortolani, gluteal creases equal  : Normal genitalia  Extremities: well-perfused, warm and dry  Neuro: easily aroused  Good symmetric tone and strength  Positive root and If you have any questions regarding [employer], you may call [employer] support at [phone]. suck.  Symmetric normal reflexes  Skin: warm and pink; birthmark over right abdomen    Intake and Output:    11/09 0701 - 11/09 1900  In: 32 [P.O.:32]  Out: -   Urine Occurrence(s): 1 Stool Occurrence(s): 1     Labs:    Recent Results (from the past 96 hour(s))   CORD BLOOD EVALUATION    Collection Time: 11/07/21 11:43 AM   Result Value Ref Range    ABO/Rh(D) O POSITIVE     XENIA IgG NEG    GLUCOSE, POC    Collection Time: 11/07/21  1:47 PM   Result Value Ref Range    Glucose (POC) 41 30 - 60 mg/dL    Performed by Taya    GLUCOSE, POC    Collection Time: 11/07/21  2:59 PM   Result Value Ref Range    Glucose (POC) 32 30 - 60 mg/dL    Performed by Penny    GLUCOSE, POC    Collection Time: 11/07/21  4:09 PM   Result Value Ref Range    Glucose (POC) 50 30 - 60 mg/dL    Performed by Penny    GLUCOSE, POC    Collection Time: 11/07/21  4:48 PM   Result Value Ref Range    Glucose (POC) 57 30 - 60 mg/dL    Performed by Kaley    GLUCOSE, POC    Collection Time: 11/07/21  7:11 PM   Result Value Ref Range    Glucose (POC) 63 (H) 30 - 60 mg/dL    Performed by Luly    GLUCOSE, POC    Collection Time: 11/07/21 10:31 PM   Result Value Ref Range    Glucose (POC) 37 30 - 60 mg/dL    Performed by Luly    GLUCOSE, POC    Collection Time: 11/08/21  1:33 AM   Result Value Ref Range    Glucose (POC) 44 (L) 50 - 90 mg/dL    Performed by Luly    GLUCOSE, POC    Collection Time: 11/08/21  2:31 AM   Result Value Ref Range    Glucose (POC) 47 (L) 50 - 90 mg/dL    Performed by Luly    GLUCOSE, POC    Collection Time: 11/08/21  5:03 AM   Result Value Ref Range    Glucose (POC) 75 50 - 90 mg/dL    Performed by Luly    GLUCOSE, POC    Collection Time: 11/08/21  9:19 AM   Result Value Ref Range    Glucose (POC) 62 50 - 90 mg/dL    Performed by Too Janes, FRACTIONATED    Collection Time: 11/09/21 12:32 AM   Result Value Ref Range    Bilirubin, total 4.6 <8.0 MG/DL    Bilirubin, direct 0.2 <0.21 MG/DL    Bilirubin, indirect 4.4 (H) 0.0 - 1.1 MG/DL       Feeding method:    Feeding Method Used: Bottle    Assessment:     Principal Problem:     (2021)      \"Dionne\" Claudia is a 39 6/7 week SGA female born via  to a  mom. GBS(-) with AROM ~3 hrs prior to delivery. Breastfeeding with supplement. Glucoses normalized. Maternal anemia requiring iron infusions.     - VSS. V/S.  - Bili LR  - O+/O+/neg  - passed hearing  - passed car seat test per Mom just now  - breastfeeding and bottle, down 4%  - Waiting on Hep B    Plan:     Follow up in my office in 2 days.     Discharge >30 minutes

## 2021-07-18 NOTE — PROGRESS NOTES
Ms. Jones is a 66 y.o. female who is here for evaluation of   Chief Complaint   Patient presents with    Annual Wellness Visit    Cough     Cough and \"raspy\" voice - worse with laying down - frequently clearing her throat    Nail Problem     Worried she has toe fungus started _ [facility]    Weight Loss     is concerned that she is slowly loosing weight    Skin Problem     (L) thumb - previously frozen    Finger Swelling     (R) ring finger- trigger finger and (L) middle finger sprain    Peripheral Neuropathy     Was told to use compression stockings   . ASSESSMENT AND PLAN:    1. Medicare annual wellness visit, subsequent  2. Encounter for screening for colorectal malignant neoplasm  - COLOGUARD TEST (FECAL DNA COLORECTAL CANCER SCREENING); Future  - COLOGUARD TEST (FECAL DNA COLORECTAL CANCER SCREENING)    3. Visit for screening mammogram  - [facility] 3D TREV W MAMMO BI SCREENING INCL CAD; Future    4. Essential hypertension  - CBC WITH AUTOMATED DIFF; Future  - LIPID PANEL; Future  - METABOLIC PANEL, COMPREHENSIVE; Future  - TSH 3RD GENERATION; Future    5. Cyanocobalamin deficiency  - VITAMIN B12; Future    6.  Iron deficiency    - FERRITIN; Future      Orders Placed This Encounter    [facility] 3D TREV W MAMMO BI SCREENING INCL CAD     Standing Status:   Future     Standing Expiration Date:   8/15/2022     Scheduling Instructions:      [facility]     Order Specific Question:   Reason for Exam     Answer:   screening    COLOGUARD TEST (FECAL DNA COLORECTAL CANCER SCREENING)     Standing Status:   Future     Number of Occurrences:   1     Standing Expiration Date:   7/29/2021    CBC WITH AUTOMATED DIFF     Standing Status:   Future     Standing Expiration Date:   7/29/2021    LIPID PANEL     Standing Status:   Future     Standing Expiration Date:   4/89/0345    METABOLIC PANEL, COMPREHENSIVE     Standing Status:   Future     Standing Expiration Date:   7/29/2021    TSH 3RD GENERATION     Standing Status:   Future Standing Expiration Date:   7/29/2021    VITAMIN B12     Standing Status:   Future     Standing Expiration Date:   7/29/2021    FERRITIN     Standing Status:   Future     Standing Expiration Date:   7/29/2021           HPI  Ms. Jones is a 66 y.o. female. Retired homemaker with congenital nystagmus who is here today with skin concerns.       History of UTI, HTN, thyroid nodule,OA, and neuropathy.  BP remains well controlled with meds.  No side effect     ROS:  Denies  fever, chills, cough, chest pain, SOB,  nausea, vomiting, or diarrhea. Denies wt loss, wt gain, hemoptysis, hematochezia or melena. Physical Examination:    Visit Vitals  /82 (BP 1 Location: Left upper arm, BP Patient Position: Sitting, BP Cuff Size: Adult long)   Pulse 90   Temp 97.1 °F (36.2 °C) (Temporal)   Resp 16   Ht 5' 6\" (1.676 m)   Wt 121 lb (54.9 kg)   SpO2 96%   BMI 19.53 kg/m²      General:  Alert, cooperative, no distress. Head:  Normocephalic, without obvious abnormality, atraumatic. Eyes:  Conjunctivae/corneas clear. Pupils equal, round, reactive to light. Extraocular movements intact. Lungs:   Clear to auscultation bilaterally. Chest wall:  No tenderness or deformity. Cardiac:  RRR   Abdomen:   Soft, non-tender. Bowel sounds normal. No masses. No organomegaly. Extremities: Extremities normal, atraumatic, no cyanosis or edema. Pulses: 2+ and symmetric all extremities. Skin: Skin color, texture, turgor normal. No rashes or lesions. Lymph nodes: Cervical, supraclavicular, and axillary nodes normal.   Neurologic: CNII-XII intact. Normal strength, sensation, and reflexes throughout. On this date 07/19/2021 I have spent 30 minutes reviewing previous notes, test results and face to face with the patient discussing the diagnosis and importance of compliance with the treatment plan as well as documenting on the day of the visit.     Charline Brennan MD FACP    (signed electronically) on 7/19/2021 at 10:43 AM

## 2021-07-19 ENCOUNTER — OFFICE VISIT (OUTPATIENT)
Dept: FAMILY MEDICINE CLINIC | Age: 71
End: 2021-07-19
Payer: MEDICARE

## 2021-07-19 VITALS
RESPIRATION RATE: 16 BRPM | HEIGHT: 66 IN | BODY MASS INDEX: 19.44 KG/M2 | TEMPERATURE: 97.1 F | OXYGEN SATURATION: 96 % | WEIGHT: 121 LBS | SYSTOLIC BLOOD PRESSURE: 134 MMHG | HEART RATE: 90 BPM | DIASTOLIC BLOOD PRESSURE: 82 MMHG

## 2021-07-19 DIAGNOSIS — E53.8 CYANOCOBALAMIN DEFICIENCY: ICD-10-CM

## 2021-07-19 DIAGNOSIS — Z12.31 VISIT FOR SCREENING MAMMOGRAM: ICD-10-CM

## 2021-07-19 DIAGNOSIS — E61.1 IRON DEFICIENCY: ICD-10-CM

## 2021-07-19 DIAGNOSIS — Z12.11 ENCOUNTER FOR SCREENING FOR COLORECTAL MALIGNANT NEOPLASM: ICD-10-CM

## 2021-07-19 DIAGNOSIS — Z00.00 MEDICARE ANNUAL WELLNESS VISIT, SUBSEQUENT: Primary | ICD-10-CM

## 2021-07-19 DIAGNOSIS — I10 ESSENTIAL HYPERTENSION: ICD-10-CM

## 2021-07-19 DIAGNOSIS — Z12.12 ENCOUNTER FOR SCREENING FOR COLORECTAL MALIGNANT NEOPLASM: ICD-10-CM

## 2021-07-19 LAB
COMMENT, HOLDF: NORMAL
SAMPLES BEING HELD,HOLD: NORMAL

## 2021-07-19 PROCEDURE — G8752 SYS BP LESS 140: HCPCS | Performed by: INTERNAL MEDICINE

## 2021-07-19 PROCEDURE — G8399 PT W/DXA RESULTS DOCUMENT: HCPCS | Performed by: INTERNAL MEDICINE

## 2021-07-19 PROCEDURE — 3017F COLORECTAL CA SCREEN DOC REV: CPT | Performed by: INTERNAL MEDICINE

## 2021-07-19 PROCEDURE — G0439 PPPS, SUBSEQ VISIT: HCPCS | Performed by: INTERNAL MEDICINE

## 2021-07-19 PROCEDURE — G8510 SCR DEP NEG, NO PLAN REQD: HCPCS | Performed by: INTERNAL MEDICINE

## 2021-07-19 PROCEDURE — G8536 NO DOC ELDER MAL SCRN: HCPCS | Performed by: INTERNAL MEDICINE

## 2021-07-19 PROCEDURE — G8427 DOCREV CUR MEDS BY ELIG CLIN: HCPCS | Performed by: INTERNAL MEDICINE

## 2021-07-19 PROCEDURE — G8754 DIAS BP LESS 90: HCPCS | Performed by: INTERNAL MEDICINE

## 2021-07-19 PROCEDURE — 99214 OFFICE O/P EST MOD 30 MIN: CPT | Performed by: INTERNAL MEDICINE

## 2021-07-19 PROCEDURE — 1101F PT FALLS ASSESS-DOCD LE1/YR: CPT | Performed by: INTERNAL MEDICINE

## 2021-07-19 PROCEDURE — G9899 SCRN MAM PERF RSLTS DOC: HCPCS | Performed by: INTERNAL MEDICINE

## 2021-07-19 PROCEDURE — 1090F PRES/ABSN URINE INCON ASSESS: CPT | Performed by: INTERNAL MEDICINE

## 2021-07-19 PROCEDURE — G8420 CALC BMI NORM PARAMETERS: HCPCS | Performed by: INTERNAL MEDICINE

## 2021-07-19 NOTE — PROGRESS NOTES
Burgess Butler is a 66 y.o. female presenting for/with:    Chief Complaint   Patient presents with    Annual Wellness Visit    Cough     Cough and \"raspy\" voice - worse with laying down - frequently clearing her throat    Nail Problem     Worried she has toe fungus started _ [place] [facility]    Weight Loss     is concerned that she is slowly loosing weight    Skin Problem     (L) thumb - previously frozen    Finger Swelling     (R) ring finger- trigger finger and (L) middle finger sprain    Peripheral Neuropathy     Was told to use compression stockings       Visit Vitals  /82 (BP 1 Location: Left upper arm, BP Patient Position: Sitting, BP Cuff Size: Adult long)   Pulse 90   Temp 97.1 °F (36.2 °C) (Temporal)   Resp 16   Ht 5' 6\" (1.676 m)   Wt 121 lb (54.9 kg)   SpO2 96%   BMI 19.53 kg/m²     Pain Scale: 0 - No pain/10  Pain Location:     1. Have you been to the ER, urgent care clinic since your last visit? Hospitalized since your last visit? NO    2. Have you seen or consulted any other health care providers outside of the [address] [place] [address] since your last visit? Include any pap smears or colon screening.  YES    Symptom review:  NO  Fever   NO  Shaking chills  NO  Cough  NO  Body aches  NO  Coughing up blood  NO  Chest congestion  NO  Chest pain  NO  Shortness of breath  NO  Profound Loss of smell/taste  NO  Nausea/Vomiting   NO  Loose stool/Diarrhea  NO  any skin issues    Patient Risk Factors Reviewed as follows:  NO  have you been in Close contact with confirmed COVID19 patient   NO  History of recent travel to affected geographical areas within the past 14 days  NO  COPD  NO  Active Cancer/Leukemia/Lymphoma/Chemotherapy  NO  Oral steroid use  NO  Pregnant  NO  Diabetes Mellitus  YES  Heart disease  NO  Asthma  NO Health care worker at home  [address] care worker  NO Is there a Pregnant Woman in the home  NO Dialysis pt in the home   NO a large number of people living in the home    Learning Assessment 7/19/2021   PRIMARY LEARNER Patient   PRIMARY LANGUAGE ENGLISH   LEARNER PREFERENCE PRIMARY DEMONSTRATION   ANSWERED BY patient   RELATIONSHIP SELF     Fall Risk Assessment, last 12 mths 7/19/2021   Able to walk? Yes   Fall in past 12 months? 0   Do you feel unsteady? 0   Are you worried about falling 0       3 most recent PHQ Screens 7/19/2021   PHQ Not Done -   Little interest or pleasure in doing things Not at all   Feeling down, depressed, irritable, or hopeless Not at all   Total Score PHQ 2 0     Abuse Screening Questionnaire 7/19/2021   Do you ever feel afraid of your partner? N   Are you in a relationship with someone who physically or mentally threatens you? N   Is it safe for you to go home? Y       ADL Assessment 7/19/2021   Feeding yourself No Help Needed   Getting from bed to chair No Help Needed   Getting dressed No Help Needed   Bathing or showering No Help Needed   Walk across the room (includes cane/walker) No Help Needed   Using the telphone No Help Needed   Taking your medications No Help Needed   Preparing meals No Help Needed   Managing money (expenses/bills) No Help Needed   Moderately strenuous housework (laundry) No Help Needed   Shopping for personal items (toiletries/medicines) No Help Needed   Shopping for groceries No Help Needed   Driving No Help Needed   Climbing a flight of stairs No Help Needed   Getting to places beyond walking distances No Help Needed        This is the Subsequent Medicare Annual Wellness Exam, performed 12 months or more after the Initial AWV or the last Subsequent AWV    I have reviewed the patient's medical history in detail and updated the computerized patient record. Assessment/Plan   Education and counseling provided:  Are appropriate based on today's review and evaluation    1. Medicare annual wellness visit, subsequent  2.  Encounter for screening for colorectal malignant neoplasm  -     COLOGUARD TEST (FECAL DNA COLORECTAL CANCER SCREENING)  3. Visit for screening mammogram  -     [facility] 3D TREV W MAMMO BI SCREENING INCL CAD; Future  4. Essential hypertension  -     CBC WITH AUTOMATED DIFF; Future  -     LIPID PANEL; Future  -     METABOLIC PANEL, COMPREHENSIVE; Future  -     TSH 3RD GENERATION; Future       Depression Risk Factor Screening     3 most recent PHQ Screens 7/19/2021   PHQ Not Done -   Little interest or pleasure in doing things Not at all   Feeling down, depressed, irritable, or hopeless Not at all   Total Score PHQ 2 0       Alcohol Risk Screen    Do you average more than 1 drink per night or more than 7 drinks a week:  No    On any one occasion in the past three months have you have had more than 3 drinks containing alcohol:  No        Functional Ability and Level of Safety    Hearing: Hearing is good. Activities of Daily Living: The home contains: no safety equipment. Patient does total self care      Ambulation: with no difficulty     Fall Risk:  Fall Risk Assessment, last 12 mths 7/19/2021   Able to walk? Yes   Fall in past 12 months? 0   Do you feel unsteady?  0   Are you worried about falling 0      Abuse Screen:  Patient is not abused       Cognitive Screening    Has your family/caregiver stated any concerns about your memory: no       Health Maintenance Due     Health Maintenance Due   Topic Date Due    Colorectal Cancer Screening Combo  07/29/2020    Breast Cancer Screen Mammogram  01/07/2021       Patient Care Team   Patient Care Team:  Cinthya Lo MD as PCP - General (Internal Medicine)  Cinthya Lo MD as PCP - [facility]  Kim Browning MD (General Surgery)    History     Patient Active Problem List   Diagnosis Code    Nonspecific abnormal finding in stool contents R19.5    Essential hypertension I10    Actinic keratoses L57.0    UTI (lower urinary tract infection) N39.0     Past Medical History:   Diagnosis Date    Basal cell carcinoma     excised from nose and clavicl  Cerumen impaction     Endocrine disorder     Hypertension     Menopause     age 47    Nystagmus     OA (osteoarthritis) of finger     Peripheral sensory neuropathy age 46    mild    Thyroid nodule     UTI (lower urinary tract infection)     recurrent      Past Surgical History:   Procedure Laterality Date    HX CATARACT REMOVAL Bilateral     lens implants    HX COLONOSCOPY  2008    normal    NE URETHRLYS, TRANSVAG W/ SCOPE      dilation     Current Outpatient Medications   Medication Sig Dispense Refill    losartan-hydroCHLOROthiazide (HYZAAR) 50-12.5 mg per tablet TAKE 1 TABLET BY MOUTH  EVERY DAY 90 Tab 4    fluticasone propionate (FLONASE ALLERGY RELIEF) 50 mcg/actuation nasal spray 2 Sprays by Both Nostrils route daily.        Allergies   Allergen Reactions    Shellfish Derived Nausea and Vomiting     Mullusks    Sulfa (Sulfonamide Antibiotics) Other (comments)    Sulfur Hives       Family History   Problem Relation Age of Onset   Andi Neal Breast Cancer Mother     Thyroid Cancer Sister     Breast Cancer Sister     Diabetes Sister         Peripheral neuropathy    Cancer Sister         Breast, CLL     Social History     Tobacco Use    Smoking status: Never Smoker    Smokeless tobacco: Never Used   Substance Use Topics    Alcohol use: Yes         Heydi Gaxiola

## 2021-07-19 NOTE — PATIENT INSTRUCTIONS

## 2021-07-20 LAB
ALBUMIN SERPL-MCNC: 3.9 G/DL (ref 3.5–5)
ALBUMIN/GLOB SERPL: 1.3 {RATIO} (ref 1.1–2.2)
ALP SERPL-CCNC: 90 U/L (ref 45–117)
ALT SERPL-CCNC: 30 U/L (ref 12–78)
ANION GAP SERPL CALC-SCNC: 3 MMOL/L (ref 5–15)
AST SERPL-CCNC: 18 U/L (ref 15–37)
BASOPHILS # BLD: 0 K/UL (ref 0–0.1)
BASOPHILS NFR BLD: 0 % (ref 0–1)
BILIRUB SERPL-MCNC: 0.4 MG/DL (ref 0.2–1)
BUN SERPL-MCNC: 14 MG/DL (ref 6–20)
BUN/CREAT SERPL: 26 (ref 12–20)
CALCIUM SERPL-MCNC: 9.7 MG/DL (ref 8.5–10.1)
CHLORIDE SERPL-SCNC: 104 MMOL/L (ref 97–108)
CHOLEST SERPL-MCNC: 204 MG/DL
CO2 SERPL-SCNC: 34 MMOL/L (ref 21–32)
CREAT SERPL-MCNC: 0.53 MG/DL (ref 0.55–1.02)
DIFFERENTIAL METHOD BLD: ABNORMAL
EOSINOPHIL # BLD: 0.1 K/UL (ref 0–0.4)
EOSINOPHIL NFR BLD: 1 % (ref 0–7)
ERYTHROCYTE [DISTWIDTH] IN BLOOD BY AUTOMATED COUNT: 13.2 % (ref 11.5–14.5)
FERRITIN SERPL-MCNC: 51 NG/ML (ref 8–252)
GLOBULIN SER CALC-MCNC: 3.1 G/DL (ref 2–4)
GLUCOSE SERPL-MCNC: 84 MG/DL (ref 65–100)
HCT VFR BLD AUTO: 42.4 % (ref 35–47)
HDLC SERPL-MCNC: 66 MG/DL
HDLC SERPL: 3.1 {RATIO} (ref 0–5)
HGB BLD-MCNC: 13.3 G/DL (ref 11.5–16)
IMM GRANULOCYTES # BLD AUTO: 0 K/UL (ref 0–0.04)
IMM GRANULOCYTES NFR BLD AUTO: 0 % (ref 0–0.5)
LDLC SERPL CALC-MCNC: 107.6 MG/DL (ref 0–100)
LYMPHOCYTES # BLD: 2.4 K/UL (ref 0.8–3.5)
LYMPHOCYTES NFR BLD: 45 % (ref 12–49)
MCH RBC QN AUTO: 31.9 PG (ref 26–34)
MCHC RBC AUTO-ENTMCNC: 31.4 G/DL (ref 30–36.5)
MCV RBC AUTO: 101.7 FL (ref 80–99)
MONOCYTES # BLD: 0.6 K/UL (ref 0–1)
MONOCYTES NFR BLD: 11 % (ref 5–13)
NEUTS SEG # BLD: 2.3 K/UL (ref 1.8–8)
NEUTS SEG NFR BLD: 43 % (ref 32–75)
NRBC # BLD: 0 K/UL (ref 0–0.01)
NRBC BLD-RTO: 0 PER 100 WBC
PLATELET # BLD AUTO: 257 K/UL (ref 150–400)
PMV BLD AUTO: 11.8 FL (ref 8.9–12.9)
POTASSIUM SERPL-SCNC: 4.3 MMOL/L (ref 3.5–5.1)
PROT SERPL-MCNC: 7 G/DL (ref 6.4–8.2)
RBC # BLD AUTO: 4.17 M/UL (ref 3.8–5.2)
SODIUM SERPL-SCNC: 141 MMOL/L (ref 136–145)
TRIGL SERPL-MCNC: 152 MG/DL (ref ?–150)
TSH SERPL DL<=0.05 MIU/L-ACNC: 1.3 UIU/ML (ref 0.36–3.74)
VIT B12 SERPL-MCNC: 525 PG/ML (ref 193–986)
VLDLC SERPL CALC-MCNC: 30.4 MG/DL
WBC # BLD AUTO: 5.3 K/UL (ref 3.6–11)

## 2021-08-01 LAB — COLOGUARD TEST, EXTERNAL: NEGATIVE

## 2021-08-02 ENCOUNTER — DOCUMENTATION ONLY (OUTPATIENT)
Dept: FAMILY MEDICINE CLINIC | Age: 71
End: 2021-08-02

## 2021-08-05 RX ORDER — ACETAMINOPHEN 500 MG
1000 TABLET ORAL
COMMUNITY
End: 2021-12-09

## 2021-08-05 NOTE — PERIOP NOTES
[address] [place] [address]  SURGICAL PRE-ADMISSION INSTRUCTIONS    ARRIVAL  · You will be called the day before your surgery with your expected arrival time. · Sign in at the  of the hospital.  You will be directed to the Surgical Waiting Room. · Please arrive at your scheduled appointment time. You have been scheduled to arrive for your procedure one or two hours prior to the expected start time of your procedure. · Every effort will be made to minimize your wait but please be aware that unforeseen circumstances may affect our schedule. EATING  · DO NOT EAT OR DRINK ANYTHING AFTER MIDNIGHT ON THE EVENING BEFORE YOUR SURGERY OR ON THE DAY OF YOUR SURGERY except for your medications (as instructed) with a sip of water. · Do not use gum, mints or lozenges on the morning of your surgery. · Please do not smoke or chew tobacco before your surgery. MEDICATIONS   · Take the following medications on the morning of your surgery with the smallest amount of water possible : NONE    STOP THESE MEDICATIONS AT THE TIMES LISTED BELOW  NONE     DRIVING/TRANSPORATION  · Have a responsible adult to drive you home from the hospital and to stay with you over night. Please have them plan to remain in the hospital during your surgery. Your surgery will not be done if you do not have a responsible adult to take you home and to stay with you. · If you have arranged for public transport, you must have a responsible adult to ride with you (who is not the ). · You may not drive for 24 hours after anesthesia. PREPARATION  · If you have a Living WiIl/Advance Directive, please bring a copy with you to scan into your chart. · Please DO NOT wear makeup or nail polish  · Please leave valuables at home,  DO NOT wear jewelry. · Wear loose, comfortable clothing that is large enough to cover a bulky dressing.     SPECIAL INSTRUCTIONS:  · Shower with the Preoperative Skin Preparation CHLORHEXIDINE as instructed.     Reviewed above preoperative instructions and answered questions by phone interview    Patient:  Gabriel Long   Date:     August 5, 2021  Time:   1:46 PM    RN:  Bradly Miles RN    Date:     August 5, 2021  Time:   1:46 PM

## 2021-08-09 PROBLEM — S52.201A: Status: ACTIVE | Noted: 2021-08-09

## 2021-08-09 NOTE — H&P
History and Physical    Patient: Jorje Jones MRN: [id number]  SSN: [id number]    YOB: 1950  Age: 66 y.o. Sex: female      Subjective: Venkat Morse is a 66 y.o. female who  Presented to my office following a fall on 08/02/2021 when she injured her right upper extremity. X-rays demonstrated a slightly angulated and displaced fracture involving the shaft of the ulna. There is concerned that there is angulation into the interosseous space in with healing this could result in limited range of motion. Because of the displacement and angulation it is recommended that she undergo open reduction internal fixation of the fracture. The risks benefits and options of surgery have been discussed in detail with the patient please refer to my office note for these details. .     Past Medical History:   Diagnosis Date    Basal cell carcinoma     excised from nose and clavicl    Cerumen impaction     Endocrine disorder     Hypertension     Menopause     age 47    Nystagmus     OA (osteoarthritis) of finger     Peripheral sensory neuropathy age 46    mild    Thyroid nodule     UTI (lower urinary tract infection)     recurrent     Past Surgical History:   Procedure Laterality Date    HX CATARACT REMOVAL Bilateral     lens implants    HX COLONOSCOPY  2008    normal    NM URETHRLYS, TRANSVAG W/ SCOPE      dilation      Family History   Problem Relation Age of Onset    Breast Cancer Mother     Thyroid Cancer Sister     Breast Cancer Sister     Diabetes Sister         Peripheral neuropathy    Cancer Sister         Breast, CLL     Social History     Tobacco Use    Smoking status: Never Smoker    Smokeless tobacco: Never Used   Substance Use Topics    Alcohol use: Yes     Alcohol/week: 2.0 standard drinks     Types: 2 Glasses of wine per week      Prior to Admission medications    Medication Sig Start Date End Date Taking?  Authorizing Provider   acetaminophen (TYLENOL) 500 mg tablet Take 1,000 mg by mouth every eight (8) hours as needed for Pain. Yes Provider, Historical   losartan-hydroCHLOROthiazide (HYZAAR) 50-12.5 mg per tablet TAKE 1 TABLET BY MOUTH  DAILY 7/20/21  Yes Kellen Huang MD   fluticasone propionate (FLONASE ALLERGY RELIEF) 50 mcg/actuation nasal spray 2 Sprays by Both Nostrils route daily. Yes Provider, Historical        Allergies   Allergen Reactions    Shellfish Derived Nausea and Vomiting     Mullusks    Sulfa (Sulfonamide Antibiotics) Other (comments)    Sulfur Hives       Review of Systems:  A comprehensive review of systems was negative. Objective: There were no vitals filed for this visit. Physical Exam:  GENERAL: alert, cooperative, no distress, appears stated age  LUNG: clear to auscultation bilaterally  HEART: regular rate and rhythm, S1, S2 normal, no murmur, click, rub or gallop  ABDOMEN: soft, non-tender. Bowel sounds normal. No masses,  no organomegaly  EXTREMITIES:  Ecchymosis, and swelling right forearm. SKIN: Normal. intact  NEUROLOGIC: negative    Assessment:     Hospital Problems  Date Reviewed: 7/17/2020        Codes Class Noted POA    * (Principal) Closed fracture of shaft of right ulna, initial encounter ICD-10-CM: S52.201A  ICD-9-CM: 813.22  8/9/2021 Yes              Plan:       Open reduction internal fixation of right ulna fracture.     Signed By: Kallie Thompson MD     August 9, 2021

## 2021-08-10 ENCOUNTER — HOSPITAL ENCOUNTER (OUTPATIENT)
Age: 71
Setting detail: OUTPATIENT SURGERY
Discharge: HOME | End: 2021-08-10
Attending: ORTHOPAEDIC SURGERY | Admitting: ORTHOPAEDIC SURGERY
Payer: MEDICARE

## 2021-08-10 ENCOUNTER — ANESTHESIA (OUTPATIENT)
Dept: SURGERY | Age: 71
End: 2021-08-10
Payer: MEDICARE

## 2021-08-10 ENCOUNTER — APPOINTMENT (OUTPATIENT)
Dept: GENERAL RADIOLOGY | Age: 71
End: 2021-08-10
Attending: ORTHOPAEDIC SURGERY
Payer: MEDICARE

## 2021-08-10 ENCOUNTER — ANESTHESIA EVENT (OUTPATIENT)
Dept: SURGERY | Age: 71
End: 2021-08-10
Payer: MEDICARE

## 2021-08-10 VITALS
RESPIRATION RATE: 15 BRPM | HEIGHT: 66 IN | BODY MASS INDEX: 18.96 KG/M2 | DIASTOLIC BLOOD PRESSURE: 65 MMHG | WEIGHT: 118 LBS | OXYGEN SATURATION: 97 % | SYSTOLIC BLOOD PRESSURE: 122 MMHG | HEART RATE: 87 BPM | TEMPERATURE: 97.6 F

## 2021-08-10 DIAGNOSIS — S52.201A CLOSED FRACTURE OF SHAFT OF RIGHT ULNA, INITIAL ENCOUNTER: Primary | ICD-10-CM

## 2021-08-10 PROCEDURE — 77030031139 HC SUT VCRL2 J&J -A: Performed by: ORTHOPAEDIC SURGERY

## 2021-08-10 PROCEDURE — 74011250636 HC RX REV CODE- 250/636: Performed by: ANESTHESIOLOGY

## 2021-08-10 PROCEDURE — 77030040922 HC BLNKT HYPOTHRM STRY -A: Performed by: ORTHOPAEDIC SURGERY

## 2021-08-10 PROCEDURE — 76210000006 HC OR PH I REC 0.5 TO 1 HR: Performed by: ORTHOPAEDIC SURGERY

## 2021-08-10 PROCEDURE — 77030002916 HC SUT ETHLN J&J -A: Performed by: ORTHOPAEDIC SURGERY

## 2021-08-10 PROCEDURE — C1713 ANCHOR/SCREW BN/BN,TIS/BN: HCPCS | Performed by: ORTHOPAEDIC SURGERY

## 2021-08-10 PROCEDURE — 74011000258 HC RX REV CODE- 258: Performed by: ORTHOPAEDIC SURGERY

## 2021-08-10 PROCEDURE — 73100 X-RAY EXAM OF WRIST: CPT

## 2021-08-10 PROCEDURE — 77030016472 HC BIT DRL QC2 SYNT -C: Performed by: ORTHOPAEDIC SURGERY

## 2021-08-10 PROCEDURE — 74011250636 HC RX REV CODE- 250/636: Performed by: ORTHOPAEDIC SURGERY

## 2021-08-10 PROCEDURE — 76010000149 HC OR TIME 1 TO 1.5 HR: Performed by: ORTHOPAEDIC SURGERY

## 2021-08-10 PROCEDURE — 74011000250 HC RX REV CODE- 250: Performed by: ANESTHESIOLOGY

## 2021-08-10 PROCEDURE — 76060000033 HC ANESTHESIA 1 TO 1.5 HR: Performed by: ORTHOPAEDIC SURGERY

## 2021-08-10 PROCEDURE — 77030003862 HC BIT DRL SYNT -B: Performed by: ORTHOPAEDIC SURGERY

## 2021-08-10 PROCEDURE — 2709999900 HC NON-CHARGEABLE SUPPLY: Performed by: ORTHOPAEDIC SURGERY

## 2021-08-10 PROCEDURE — 77030000032 HC CUF TRNQT ZIMM -B: Performed by: ORTHOPAEDIC SURGERY

## 2021-08-10 PROCEDURE — 77030040361 HC SLV COMPR DVT MDII -B: Performed by: ORTHOPAEDIC SURGERY

## 2021-08-10 PROCEDURE — 77030008368 HC SPLNT ORTHGLS BSNM -B: Performed by: ORTHOPAEDIC SURGERY

## 2021-08-10 DEVICE — SCREW BNE L14MM DIA3.5MM CORT S STL ST FULL THRD LOK T15: Type: IMPLANTABLE DEVICE | Site: WRIST | Status: FUNCTIONAL

## 2021-08-10 DEVICE — PLATE BNE L69MM 6 H S STL 1/3 TBLR LOK COMPR W/ CLLR FOR: Type: IMPLANTABLE DEVICE | Site: WRIST | Status: FUNCTIONAL

## 2021-08-10 DEVICE — SCREW BNE L16MM DIA3.5MM CORT S STL ST LOK FULL THRD T15: Type: IMPLANTABLE DEVICE | Site: WRIST | Status: FUNCTIONAL

## 2021-08-10 DEVICE — SCREW BNE L14MM DIA3.5MM CORT S STL ST LOK FULL THRD: Type: IMPLANTABLE DEVICE | Site: WRIST | Status: FUNCTIONAL

## 2021-08-10 DEVICE — SCREW BNE L10MM DIA3.5MM CORT S STL ST LOK FULL THRD: Type: IMPLANTABLE DEVICE | Site: WRIST | Status: FUNCTIONAL

## 2021-08-10 DEVICE — 3.5MM LOCKING SCREW SLF-TPNG W/STARDRIVE(TM) RECESS 16MM: Type: IMPLANTABLE DEVICE | Site: WRIST | Status: FUNCTIONAL

## 2021-08-10 RX ORDER — ONDANSETRON 2 MG/ML
INJECTION INTRAMUSCULAR; INTRAVENOUS AS NEEDED
Status: DISCONTINUED | OUTPATIENT
Start: 2021-08-10 | End: 2021-08-10 | Stop reason: HOSPADM

## 2021-08-10 RX ORDER — PROPOFOL 10 MG/ML
INJECTION, EMULSION INTRAVENOUS AS NEEDED
Status: DISCONTINUED | OUTPATIENT
Start: 2021-08-10 | End: 2021-08-10 | Stop reason: HOSPADM

## 2021-08-10 RX ORDER — DEXMEDETOMIDINE HYDROCHLORIDE 100 UG/ML
INJECTION, SOLUTION INTRAVENOUS AS NEEDED
Status: DISCONTINUED | OUTPATIENT
Start: 2021-08-10 | End: 2021-08-10 | Stop reason: HOSPADM

## 2021-08-10 RX ORDER — HYDROCODONE BITARTRATE AND ACETAMINOPHEN 5; 325 MG/1; MG/1
1 TABLET ORAL
Qty: 12 TABLET | Refills: 0 | Status: SHIPPED | OUTPATIENT
Start: 2021-08-10 | End: 2021-08-13

## 2021-08-10 RX ORDER — SODIUM CHLORIDE, SODIUM LACTATE, POTASSIUM CHLORIDE, CALCIUM CHLORIDE 600; 310; 30; 20 MG/100ML; MG/100ML; MG/100ML; MG/100ML
1000 INJECTION, SOLUTION INTRAVENOUS CONTINUOUS
Status: DISCONTINUED | OUTPATIENT
Start: 2021-08-10 | End: 2021-08-10 | Stop reason: HOSPADM

## 2021-08-10 RX ORDER — IBUPROFEN 200 MG
200 TABLET ORAL AS NEEDED
COMMUNITY
End: 2021-08-10

## 2021-08-10 RX ORDER — MIDAZOLAM HYDROCHLORIDE 1 MG/ML
INJECTION, SOLUTION INTRAMUSCULAR; INTRAVENOUS AS NEEDED
Status: DISCONTINUED | OUTPATIENT
Start: 2021-08-10 | End: 2021-08-10 | Stop reason: HOSPADM

## 2021-08-10 RX ORDER — DEXAMETHASONE SODIUM PHOSPHATE 4 MG/ML
INJECTION, SOLUTION INTRA-ARTICULAR; INTRALESIONAL; INTRAMUSCULAR; INTRAVENOUS; SOFT TISSUE
Status: SHIPPED | OUTPATIENT
Start: 2021-08-10 | End: 2021-08-10

## 2021-08-10 RX ORDER — KETOROLAC TROMETHAMINE 30 MG/ML
INJECTION, SOLUTION INTRAMUSCULAR; INTRAVENOUS AS NEEDED
Status: DISCONTINUED | OUTPATIENT
Start: 2021-08-10 | End: 2021-08-10 | Stop reason: HOSPADM

## 2021-08-10 RX ORDER — CEFAZOLIN SODIUM IN 0.9 % NACL 2 G/50 ML
2 INTRAVENOUS SOLUTION, PIGGYBACK (ML) INTRAVENOUS ONCE
Status: COMPLETED | OUTPATIENT
Start: 2021-08-10 | End: 2021-08-10

## 2021-08-10 RX ORDER — IBUPROFEN 600 MG/1
600 TABLET ORAL
Qty: 60 TABLET | Refills: 2 | Status: SHIPPED | OUTPATIENT
Start: 2021-08-10 | End: 2021-12-09

## 2021-08-10 RX ADMIN — PROPOFOL 100 MG: 10 INJECTION, EMULSION INTRAVENOUS at 12:08

## 2021-08-10 RX ADMIN — MIDAZOLAM 2 MG: 1 INJECTION INTRAMUSCULAR; INTRAVENOUS at 11:50

## 2021-08-10 RX ADMIN — DEXMEDETOMIDINE HCL 20 MCG: 100 INJECTION INTRAVENOUS at 11:56

## 2021-08-10 RX ADMIN — SODIUM CHLORIDE, POTASSIUM CHLORIDE, SODIUM LACTATE AND CALCIUM CHLORIDE 1000 ML: 600; 310; 30; 20 INJECTION, SOLUTION INTRAVENOUS at 11:41

## 2021-08-10 RX ADMIN — ONDANSETRON 4 MG: 2 INJECTION INTRAMUSCULAR; INTRAVENOUS at 12:58

## 2021-08-10 RX ADMIN — EPINEPHRINE 35 ML: 1 INJECTION, SOLUTION, CONCENTRATE INTRAVENOUS at 11:56

## 2021-08-10 RX ADMIN — KETOROLAC TROMETHAMINE 15 MG: 30 INJECTION, SOLUTION INTRAMUSCULAR at 13:00

## 2021-08-10 RX ADMIN — DEXAMETHASONE SODIUM PHOSPHATE 4 MG: 4 INJECTION, SOLUTION INTRAMUSCULAR; INTRAVENOUS at 11:56

## 2021-08-10 RX ADMIN — SODIUM CHLORIDE, POTASSIUM CHLORIDE, SODIUM LACTATE AND CALCIUM CHLORIDE: 600; 310; 30; 20 INJECTION, SOLUTION INTRAVENOUS at 13:08

## 2021-08-10 RX ADMIN — CEFAZOLIN SODIUM 2 G: 10 INJECTION, POWDER, FOR SOLUTION INTRAVENOUS at 11:55

## 2021-08-10 NOTE — ANESTHESIA PROCEDURE NOTES
Peripheral Block    Start time: 8/10/2021 11:52 AM  End time: 8/10/2021 12:00 PM  Performed by: Josh Cronin MD  Authorized by: Josh Cronin MD       Pre-procedure: Indications: at surgeon's request and post-op pain management    Preanesthetic Checklist: patient identified, risks and benefits discussed, site marked, timeout performed, anesthesia consent given and patient being monitored    Timeout Time: 11:52 EDT          Block Type:   Block Type:  Supraclavicular  Laterality:  Right  Monitoring:  Standard ASA monitoring, responsive to questions, continuous pulse ox, oxygen, frequent vital sign checks and heart rate  Injection Technique:  Single shot  Procedures: ultrasound guided    Patient Position: seated  Prep: chlorhexidine    Location:  Supraclavicular  Needle Type:  SonoPlex  Needle Gauge:  21 G  Needle Localization:  Ultrasound guidance  Medication Injected:  Ropivacaine 0.2% with epinephrine 1:200,000 injection, 35 mL (Mixture components: ropivacaine 2 mg/mL (0.2 %) Soln, 1 mL; EPINEPHrine HCl (PF) 1 mg/mL (1 mL) Soln, . 005 mL)  dexamethasone (DECADRON) 4 mg/mL injection, 4 mg  Med Admin Time: 8/10/2021 11:56 AM    Assessment:    Injection Assessment:  Incremental injection every 5 mL, negative aspiration for CSF, no paresthesia, ultrasound image on chart, local visualized surrounding nerve on ultrasound, negative aspiration for blood, no intravascular symptoms and low pressure verified by pressure monitor  Patient tolerance:  Patient tolerated the procedure well with no immediate complications

## 2021-08-10 NOTE — BRIEF OP NOTE
Brief Postoperative Note    Patient: Benito uLgo May  YOB: 1950  MRN: [id number]    Date of Procedure: 8/10/2021     Pre-Op Diagnosis: RIGHT ULNA shaft FRACTURE    Post-Op Diagnosis: Same as preoperative diagnosis. Procedure(s):  OPEN REDUCTION INTERNAL FIXATION RIGHT ULNA FRACTURE (URGENT)    C-ARM    Surgeon(s):  Kenn Blandon MD    Surgical Assistant: None    Anesthesia: General     Estimated Blood Loss (mL): Minimal    Complications: None    Specimens: * No specimens in log *     Implants: * No implants in log *    Drains: * No LDAs found *    Findings: Displaced and angulated ulna shaft fracture at distal 1/3 shaft.     Electronically Signed by Alvino Halsted, MD on 8/10/2021 at 1:12 PM

## 2021-08-10 NOTE — ANESTHESIA POSTPROCEDURE EVALUATION
Procedure(s):  OPEN REDUCTION INTERNAL FIXATION RIGHT WRIST ULNA FRACTURE (URGENT)    C-ARM. general    Anesthesia Post Evaluation      Multimodal analgesia: multimodal analgesia used between 6 hours prior to anesthesia start to PACU discharge  Patient location during evaluation: bedside  Patient participation: complete - patient participated  Level of consciousness: awake and alert  Pain score: 0  Pain management: adequate  Airway patency: patent  Anesthetic complications: no  Cardiovascular status: acceptable  Respiratory status: acceptable  Hydration status: acceptable  Post anesthesia nausea and vomiting:  none  Final Post Anesthesia Temperature Assessment:  Normothermia (36.0-37.5 degrees C)      INITIAL Post-op Vital signs:   Vitals Value Taken Time   /65 08/10/21 1345   Temp 36.4 °C (97.6 °F) 08/10/21 1313   Pulse 87 08/10/21 1344   Resp 13 08/10/21 1344   SpO2 98 % 08/10/21 1344   Vitals shown include unvalidated device data.

## 2021-08-10 NOTE — ANESTHESIA PREPROCEDURE EVALUATION
Relevant Problems   CARDIOVASCULAR   (+) Essential hypertension       Anesthetic History   No history of anesthetic complications            Review of Systems / Medical History  Patient summary reviewed, nursing notes reviewed and pertinent labs reviewed    Pulmonary  Within defined limits                 Neuro/Psych   Within defined limits          Comments: Peripheral neuropathy Cardiovascular    Hypertension                   GI/Hepatic/Renal  Within defined limits              Endo/Other      Hypothyroidism       Other Findings              Physical Exam    Airway  Mallampati: I  TM Distance: 4 - 6 cm  Neck ROM: normal range of motion   Mouth opening: Normal     Cardiovascular    Rhythm: regular  Rate: normal         Dental  No notable dental hx       Pulmonary  Breath sounds clear to auscultation               Abdominal  GI exam deferred       Other Findings            Anesthetic Plan    ASA: 2  Anesthesia type: general      Post-op pain plan if not by surgeon: peripheral nerve block single    Induction: Intravenous  Anesthetic plan and risks discussed with: Patient      I've discussed the use of supraclavicular plexus block for post op pain control. Details of ultrasound placement of the block, risks (infection, hemorrhage, pneumothorax, nerve damage, central neural axis block, local anesthetic toxicity, failed block, etc) explained to patient. She understands and accepts. Ultrasound guidance required for needle placement and to document local anesthetic spread.

## 2021-08-10 NOTE — DISCHARGE INSTRUCTIONS
Upper Extremity Surgery  Discharge Instructions  Aurelia Herman M.D. Patient Name  Christa Lefort May  Date of procedure  8/10/2021    Procedure  Procedure(s):  OPEN REDUCTION INTERNAL FIXATION RIGHT WRIST ULNA FRACTURE (URGENT)    C-ARM  Surgeon  Surgeon(s) and Role:     * Ilir Trinh MD - Primary  Date of discharge: No discharge date for patient encounter. PCP: Tor Johnson MD    Please take the time to review the following instructions before you leave the hospital/surgery center and use them as guidelines during your recovery from surgery. If you have any questions, you may contact my office at [phone]. Follow only those instructions marked by a \"Y\"  below. Wound Care/Dressing Changes      __Y___ Do not remove your dressings or get them wet.    ---Y----- You may loosen the ace wrap if the dressing is applying uncomfortable pressure. Showering/Bathing    __Y___ Don't remove your dressings or get them wet until you are seen at your follow-up appointment with Dr. Arun Smith. __Y___ Don't get your incisions wet until the sutures are removed. After the sutures are removed, you may shower but do not soak the incision under water for at least 3 weeks after surgery. Exercises:    __Y___ May move your fingers as much as  tolerated. Avoid lifting of any objects heavier than a coffee cup. Ice/Elevation:    __Y___ Continue ice consistently for the next 48 hours as needed for pain and swelling. You should ice your shoulder at least three times each day for one week in cycles of ice on for twenty minutes, ice off for twenty minutes. Repeat for a total of two hours each time. Diet:    __Y___ Aleisha Mendoza may advance to your regular diet as tolerated. Increase your clear liquid intake for the next 2 to 3 days. Medication:    ___Y__ Aleisha Mendoza will be given a prescription pain medicine when you are discharged from the hospital/surgery center.   Take the medication on an as-needed basis according to the directions on the prescription bottle. Possible side effects of this medication include dizziness, headache, nausea, vomitting, constipation, and urinary retention. If you experience any of these side effects, call the office so that we may assist you in relieving them. Stop the use of pain medications if you develop excessive itching, a rash, shortness of breath, or difficulty swallowing. If these symptoms are severe or are not relieved by stopping the medication, you should seek immediate medical attention. Refills of pain medication are authorized during office hours ONLY (8am-5pm, M-F). __Y___ Sonal Gan may resume the medications you were taking prior to surgery. Pain medication may potentiate the effects of any anti-depressant medication you are taking. If you have any questions about possible interactions between your regular medications and the pain medication, you should consult your medical doctor who prescribes your regular medications. __Y___ Sonal Gan may use Ibuprofen 600mg three to four times a day for 2-3 days after surgery. This can be used along with your pain medication. Follow-up with Dr. Ericka Aguero in _______10 days________________________.    ______________________________________________________________________    Anesthesia Discharge Instructions    After general anesthesia or intervenous sedation, for 24 hours or while taking prescription Narcotics:  · Limit your activities  · Do not drive or operate hazardous machinery  · If you have not urinated within 8 hours after discharge, please contact your surgeon on call.   · Do not make important personal or business decisions  · Do not drink alcoholic beverages    Report the following to your surgeon:  · Excessive pain, swelling, redness or odor of or around the surgical area  · Temperature over 100.5 degrees  · Nausea and vomiting lasting longer than 4 hours or if unable to take medication  · Any signs of decreased circulation or nerve impairment to extremity:  Change in color, persistent numbness, tingling, coldness or increased pain.   · Any questions

## 2021-08-10 NOTE — OP NOTES
[facility]  OPERATIVE REPORT    Name:  Cm Dee  MR#:  [id number]  :  1950  ACCOUNT #:  [de-identified]  DATE OF SERVICE:  08/10/2021    PREOPERATIVE DIAGNOSIS:  Displaced right ulnar shaft fracture. POSTOPERATIVE DIAGNOSIS:  Displaced right ulnar shaft fracture. PROCEDURE PERFORMED:  Open reduction and internal fixation of displaced and angulated ulnar shaft fracture at the junction between the proximal two-thirds and distal one-third. SURGEON:  Alvino Halsted, MD ASSISTANTHartwell Mortimer    ANESTHESIA:  General, Dr. Krystyna Valdez. COMPLICATIONS:  0    SPECIMENS REMOVED:  None. IMPLANTS:  A 6-hole locking Synthes plate with a combination of locking and nonlocking cortical screws. Six screws in total with two cortical nonlocking and four locking screws. ESTIMATED BLOOD LOSS:  Negligible, under tourniquet control. TOURNIQUET TIME:  38 minutes. PROCEDURE IN DETAIL:  The patient was evaluated in the outpatient surgery area. The right forearm was marked as the surgical site, verified with the patient. The splint was removed and verified that skin was intact underneath. At this point, she was taken to the PACU and underwent a regional block by Dr. Krystyna Valdez. She was then taken to the operating room and underwent LMA anesthesia as well. She was placed supine on the operating table. A tourniquet was applied to the right upper extremity. It was prepped and draped in the usual sterile fashion. Time-out was called verifying the patient's site of surgery, surgical procedure, and preoperative antibiotic. The extremity was exsanguinated and tourniquet 32. A longitudinal incision was made over the ulna and dissection carried sharply through skin and bluntly through subcutaneous tissue. The fascia overlying the ulna, which was in a subcutaneous location was incised sharply with a knife, and a periosteal elevation was carried out around the fracture site.   The fracture site was identified and cleaned of the debris. The elevator was used to expose the bone edges. Once this was done, the fracture was grasped with bone holding clamps and reduction was carried out. Overall, stable reduction once in place. The low profile locking plate was then applied to the volar-ulnar surface of the bone. The bone holders were then positioned over the plate, again holding an anatomic position of the fracture. At this point, two cortical screws were placed in such fashion that as they were tightened, they would draw the fracture together and give some interfragmentary compression. Once these were in position, the position of the plate and fracture were visualized in both AP and lateral views, felt to be acceptable. At this point, both screws were tightened using the [place] screwed into the plate. Two locking screws proximally and two locking screws distally were applied giving excellent fixation of the fracture. Taken through range of motion with good stability. Position checked under AP, lateral, and oblique views, and felt to be satisfactory. At this point, the retractors were removed. The operative site irrigated. The fascia was reapproximated with 0 Vicryl figure-of-eight sutures. The skin was re-approximated with staples of the skin. Sterile bulky compressive dressing. The patient returned to recovery room in satisfactory condition.       Nina Thurman MD      [id number]  D:  08/10/2021 13:33  T:  08/10/2021 16:45  JOB #:  [id number]

## 2021-08-13 ENCOUNTER — HOSPITAL ENCOUNTER (OUTPATIENT)
Dept: MAMMOGRAPHY | Age: 71
Discharge: HOME | End: 2021-08-13
Attending: INTERNAL MEDICINE
Payer: MEDICARE

## 2021-08-13 DIAGNOSIS — Z12.31 VISIT FOR SCREENING MAMMOGRAM: ICD-10-CM

## 2021-08-13 PROCEDURE — 77063 BREAST TOMOSYNTHESIS BI: CPT

## 2021-08-16 DIAGNOSIS — R92.8 ABNORMAL MAMMOGRAM: Primary | ICD-10-CM

## 2021-08-26 ENCOUNTER — OFFICE VISIT (OUTPATIENT)
Dept: FAMILY MEDICINE CLINIC | Age: 71
End: 2021-08-26
Payer: MEDICARE

## 2021-08-26 VITALS
HEART RATE: 96 BPM | SYSTOLIC BLOOD PRESSURE: 148 MMHG | HEIGHT: 66 IN | RESPIRATION RATE: 16 BRPM | TEMPERATURE: 98 F | DIASTOLIC BLOOD PRESSURE: 88 MMHG | BODY MASS INDEX: 19.32 KG/M2 | OXYGEN SATURATION: 96 % | WEIGHT: 120.2 LBS

## 2021-08-26 DIAGNOSIS — I10 ESSENTIAL HYPERTENSION: Primary | ICD-10-CM

## 2021-08-26 PROCEDURE — 99214 OFFICE O/P EST MOD 30 MIN: CPT | Performed by: INTERNAL MEDICINE

## 2021-08-26 RX ORDER — LABETALOL 200 MG/1
200 TABLET, FILM COATED ORAL 2 TIMES DAILY
Qty: 180 TABLET | Refills: 4 | Status: SHIPPED | OUTPATIENT
Start: 2021-08-26 | End: 2022-03-26 | Stop reason: ALTCHOICE

## 2021-08-26 NOTE — PROGRESS NOTES
Fernando Mcnulty is a 66 y.o. female presenting for/with:    Chief Complaint   Patient presents with    Hypertension     Report SBP 190s at night last week, but will range 140s-150    Hip Pain     C/O (R) hip hematoma from fall. reports improvement       Visit Vitals  BP (!) 148/88 (BP 1 Location: Left upper arm, BP Patient Position: Sitting, BP Cuff Size: Adult long)   Pulse 96   Temp 98 °F (36.7 °C) (Temporal)   Resp 16   Ht 5' 6\" (1.676 m)   Wt 120 lb 3.2 oz (54.5 kg)   SpO2 96%   BMI 19.40 kg/m²     Pain Scale: 0 - No pain/10  Pain Location:     1. Have you been to the ER, urgent care clinic since your last visit? Hospitalized since your last visit? NO    2. Have you seen or consulted any other health care providers outside of the [address] [place] [address] since your last visit? Include any pap smears or colon screening. NO    Symptom review:  NO  Fever   NO  Shaking chills  NO  Cough  NO  Body aches  NO  Coughing up blood  NO  Chest congestion  NO  Chest pain  NO  Shortness of breath  NO  Profound Loss of smell/taste  NO  Nausea/Vomiting   NO  Loose stool/Diarrhea  NO  any skin issues    Patient Risk Factors Reviewed as follows:  NO  have you been in Close contact with confirmed COVID19 patient   NO  History of recent travel to affected geographical areas within the past 14 days  NO  COPD  NO  Active Cancer/Leukemia/Lymphoma/Chemotherapy  NO  Oral steroid use  NO  Pregnant  NO  Diabetes Mellitus  YES  Heart disease  NO  Asthma  NO Health care worker at home  [address] care worker  NO Is there a Pregnant Woman in the home  NO Dialysis pt in the home   NO a large number of people living in the home    Learning Assessment 7/19/2021   PRIMARY LEARNER Patient   PRIMARY LANGUAGE ENGLISH   LEARNER PREFERENCE PRIMARY DEMONSTRATION   ANSWERED BY patient   RELATIONSHIP SELF     Fall Risk Assessment, last 12 mths 8/26/2021   Able to walk? Yes   Fall in past 12 months? 1   Do you feel unsteady?  0   Are you worried about falling 0   Is the gait abnormal? 0   Number of falls in past 12 months 1   Fall with injury? 1       3 most recent PHQ Screens 8/26/2021   PHQ Not Done -   Little interest or pleasure in doing things Not at all   Feeling down, depressed, irritable, or hopeless Not at all   Total Score PHQ 2 0     Abuse Screening Questionnaire 8/26/2021   Do you ever feel afraid of your partner? N   Are you in a relationship with someone who physically or mentally threatens you? N   Is it safe for you to go home?  Y       ADL Assessment 8/26/2021   Feeding yourself No Help Needed   Getting from bed to chair No Help Needed   Getting dressed No Help Needed   Bathing or showering No Help Needed   Walk across the room (includes cane/walker) No Help Needed   Using the telphone No Help Needed   Taking your medications No Help Needed   Preparing meals No Help Needed   Managing money (expenses/bills) No Help Needed   Moderately strenuous housework (laundry) No Help Needed   Shopping for personal items (toiletries/medicines) No Help Needed   Shopping for groceries No Help Needed   Driving No Help Needed   Climbing a flight of stairs No Help Needed   Getting to places beyond walking distances No Help Needed

## 2021-08-26 NOTE — PROGRESS NOTES
Ms. Jones is a 66 y.o. female who is here for evaluation of   Chief Complaint   Patient presents with    Hypertension     Report SBP 190s at night last week, but will range 140s-150    Hip Pain     C/O (R) hip hematoma from fall. reports improvement   . ASSESSMENT AND PLAN:    1. Essential hypertension  Will need additional coverage. Re eval in Sept  - labetaloL (NORMODYNE) 200 mg tablet; Take 1 Tablet by mouth two (2) times a day. Indications: high blood pressure  Dispense: 180 Tablet; Refill: 4      Orders Placed This Encounter    labetaloL (NORMODYNE) 200 mg tablet     Sig: Take 1 Tablet by mouth two (2) times a day. Indications: high blood pressure     Dispense:  180 Tablet     Refill:  4           HPI  67 yo with HTN. Losartan HCTZ started with some improvement but at night the BP is > 190. ROS:  Denies  fever, chills, cough, chest pain, SOB,  nausea, vomiting, or diarrhea. Denies wt loss, wt gain, hemoptysis, hematochezia or melena. Physical Examination:    Visit Vitals  BP (!) 148/88 (BP 1 Location: Left upper arm, BP Patient Position: Sitting, BP Cuff Size: Adult long)   Pulse 96   Temp 98 °F (36.7 °C) (Temporal)   Resp 16   Ht 5' 6\" (1.676 m)   Wt 120 lb 3.2 oz (54.5 kg)   SpO2 96%   BMI 19.40 kg/m²      General:  Alert, cooperative, no distress. Head:  Normocephalic, without obvious abnormality, atraumatic. Eyes:  Conjunctivae/corneas clear. Pupils equal, round, reactive to light. Extraocular movements intact. Lungs:   Clear to auscultation bilaterally. Chest wall:  No tenderness or deformity. Cardiac:  RRR   Abdomen:   Soft, non-tender. Bowel sounds normal. No masses. No organomegaly. Extremities: Extremities normal, atraumatic, no cyanosis or edema. Pulses: 2+ and symmetric all extremities. Skin: Skin color, texture, turgor normal. No rashes or lesions. Lymph nodes: Cervical, supraclavicular, and axillary nodes normal.   Neurologic: CNII-XII intact.  Normal strength, sensation, and reflexes throughout. On this date 08/26/2021 I have spent 30 minutes reviewing previous notes, test results and face to face with the patient discussing the diagnosis and importance of compliance with the treatment plan as well as documenting on the day of the visit.     Charlie Thomas MD FACP    (signed electronically) on 8/26/2021 at 11:38 AM

## 2021-09-02 ENCOUNTER — HOSPITAL ENCOUNTER (OUTPATIENT)
Dept: MAMMOGRAPHY | Age: 71
Discharge: HOME | End: 2021-09-02
Attending: INTERNAL MEDICINE
Payer: MEDICARE

## 2021-09-02 DIAGNOSIS — R92.8 ABNORMAL MAMMOGRAM: ICD-10-CM

## 2021-09-02 PROCEDURE — 77065 DX MAMMO INCL CAD UNI: CPT

## 2021-09-03 ENCOUNTER — TELEPHONE (OUTPATIENT)
Dept: FAMILY MEDICINE CLINIC | Age: 71
End: 2021-09-03

## 2021-09-03 NOTE — TELEPHONE ENCOUNTER
----- Message from [employer] sent at 9/2/2021  4:50 PM EDT -----  Regarding: Dr. Lucretia Austin  Patient return call    Caller's first and last name and relationship (if not the patient):  Bel Jiménez contact number(s): [phone]      Whose call is being returned: na      Details to clarify the request: pt returning call      [employer]

## 2021-09-19 NOTE — PROGRESS NOTES
Ms. Jones is a 66 y.o. female who is here for evaluation of   Chief Complaint   Patient presents with    Hypertension     States BP has been all over from [id number]. Checked home machine VS maunal within 6pts   . ASSESSMENT AND PLAN:    1. Essential hypertension  Stable at this time and is at goal.  RTC in December. Continue current meds. In the event that she becomes dizzy or lightheaded on a regular basis, we may need to cut back on her meds. No orders of the defined types were placed in this encounter. HPI  67 yo seen last month with HTN. Started on Labetalol. Returns for interval assessment. ROS:  Denies  fever, chills, cough, chest pain, SOB,  nausea, vomiting, or diarrhea. Denies wt loss, wt gain, hemoptysis, hematochezia or melena. Physical Examination:    Visit Vitals  /68 (BP 1 Location: Left upper arm, BP Patient Position: Sitting, BP Cuff Size: Adult long)   Pulse 70   Temp 98.4 °F (36.9 °C) (Temporal)   Resp 17   Ht 5' 6\" (1.676 m)   Wt 122 lb 6.4 oz (55.5 kg)   SpO2 97%   BMI 19.76 kg/m²      General:  Alert, cooperative, no distress. Head:  Normocephalic, without obvious abnormality, atraumatic. Eyes:  Conjunctivae/corneas clear. Pupils equal, round, reactive to light. Extraocular movements intact. Lungs:   Clear to auscultation bilaterally. Chest wall:  No tenderness or deformity. Cardiac:  RRr   Abdomen:   Soft, non-tender. Bowel sounds normal. No masses. No organomegaly. Extremities: Extremities normal, atraumatic, no cyanosis or edema. Pulses: 2+ and symmetric all extremities. Skin: Skin color, texture, turgor normal. No rashes or lesions. Lymph nodes: Cervical, supraclavicular, and axillary nodes normal.   Neurologic: CNII-XII intact. Normal strength, sensation, and reflexes throughout.      On this date 09/23/2021 I have spent 15 minutes reviewing previous notes, test results and face to face with the patient discussing the diagnosis and importance of compliance with the treatment plan as well as documenting on the day of the visit.     Lou Olmedo MD FACP    (signed electronically) on 9/23/2021 at 4:05 PM

## 2021-09-20 ENCOUNTER — TELEPHONE (OUTPATIENT)
Dept: FAMILY MEDICINE CLINIC | Age: 71
End: 2021-09-20

## 2021-09-20 NOTE — TELEPHONE ENCOUNTER
Pt calling to ask advise about her eye. She just left the eye doctor and was told she would be able to reach him after hours if she had any issues, but could not get anyone. Had procedure for chalazion today by Dr. Gabriela Arambula. Has bandage on eye. Noticed a \"discomfort but not pain\" in the corner. Wasn't sure if she should remove the bandage or do anything different. Was also told to apply ice but unsure for how long. Was told not to remove bandage until tomorrow as it is a pressure dressing. Advised pt is no major discomfort/pain I would leave the bandage as directed. Apply ice at least 20 min on 40 min off except while sleeping until tomorrow. Unsure what Ophthalmology would recommend. She should contact them on Monday. Seek care at [facility] or ER if any worsening/concerns over weekend. Pt thanks for the call and advise. She denies other symptoms at this time.

## 2021-09-23 ENCOUNTER — OFFICE VISIT (OUTPATIENT)
Dept: FAMILY MEDICINE CLINIC | Age: 71
End: 2021-09-23
Payer: MEDICARE

## 2021-09-23 VITALS
HEIGHT: 66 IN | RESPIRATION RATE: 17 BRPM | WEIGHT: 122.4 LBS | TEMPERATURE: 98.4 F | DIASTOLIC BLOOD PRESSURE: 68 MMHG | SYSTOLIC BLOOD PRESSURE: 116 MMHG | HEART RATE: 70 BPM | OXYGEN SATURATION: 97 % | BODY MASS INDEX: 19.67 KG/M2

## 2021-09-23 DIAGNOSIS — I10 ESSENTIAL HYPERTENSION: Primary | ICD-10-CM

## 2021-09-23 PROCEDURE — 99213 OFFICE O/P EST LOW 20 MIN: CPT | Performed by: INTERNAL MEDICINE

## 2021-12-05 NOTE — PROGRESS NOTES
Ms. Jones is a 66 y.o. female who is here for evaluation of   Chief Complaint   Patient presents with    Hypertension     Is worried that the diuretic is causing her frequency    Immunization/Injection     Vaccines updated    Skin Exam     Area on back and (L) thumb needs to be frozen   . ASSESSMENT AND PLAN:    1. Essential hypertension  She is currently at goal.  2. Encounter for screening for colorectal malignant neoplasm  Cologuard recently. 3. 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 Currently she is taking losartan, HCTZ and labetalol. Longstanding tickle cough in the mid throat in the larynx area at least a year if not longer. Pepcid did not help. Remains on Flonase. Has not had ENT evaluation. ROS:  Denies fever, chills, chest pain, SOB,  nausea, vomiting, or diarrhea. Denies wt loss, wt gain, hemoptysis, hematochezia or melena. Physical Examination:    Visit Vitals  /60 (BP 1 Location: Left upper arm, BP Patient Position: Sitting, BP Cuff Size: Small adult)   Pulse 71   Temp 97.8 °F (36.6 °C) (Temporal)   Resp 18   Ht 5' 6\" (1.676 m)   Wt 124 lb 6.4 oz (56.4 kg)   SpO2 96%   BMI 20.08 kg/m²      General:  Alert, cooperative, no distress. Head:  Normocephalic, without obvious abnormality, atraumatic. Eyes:  Conjunctivae/corneas clear. Pupils equal, round, reactive to light. Extraocular movements intact. Lungs:   Clear to auscultation bilaterally. Chest wall:  No tenderness or deformity. Cardiac:  RRR   Abdomen:   Soft, non-tender. Bowel sounds normal. No masses. No organomegaly. Extremities: Extremities normal, atraumatic, no cyanosis or edema. Pulses: 2+ and symmetric all extremities.    Skin: Skin color, texture, turgor normal. No rashes or lesions. Lymph nodes: Cervical, supraclavicular, and axillary nodes normal.   Neurologic: CNII-XII intact. Normal strength, sensation, and reflexes throughout. Horizontal nystagmus is present bilaterally     On this date 12/09/2021 I have spent 30 minutes reviewing previous notes, test results and face to face with the patient discussing the diagnosis and importance of compliance with the treatment plan as well as documenting on the day of the visit.     Luz Anguiano MD FACP    (signed electronically) on 12/9/2021 at 7:10 AM

## 2021-12-09 ENCOUNTER — OFFICE VISIT (OUTPATIENT)
Dept: FAMILY MEDICINE CLINIC | Age: 71
End: 2021-12-09
Payer: MEDICARE

## 2021-12-09 VITALS
TEMPERATURE: 97.8 F | DIASTOLIC BLOOD PRESSURE: 60 MMHG | HEIGHT: 66 IN | BODY MASS INDEX: 19.99 KG/M2 | HEART RATE: 71 BPM | WEIGHT: 124.4 LBS | RESPIRATION RATE: 18 BRPM | SYSTOLIC BLOOD PRESSURE: 124 MMHG | OXYGEN SATURATION: 96 %

## 2021-12-09 DIAGNOSIS — R49.0 HOARSENESS: ICD-10-CM

## 2021-12-09 DIAGNOSIS — I10 ESSENTIAL HYPERTENSION: Primary | ICD-10-CM

## 2021-12-09 PROCEDURE — 99214 OFFICE O/P EST MOD 30 MIN: CPT | Performed by: INTERNAL MEDICINE

## 2021-12-09 NOTE — PROGRESS NOTES
Donta Lilly is a 66 y.o. female presenting for/with:    Chief Complaint   Patient presents with    Hypertension     Is worried that the diuretic is causing her frequency    Immunization/Injection     Vaccines updated    Skin Exam     Area on back and (L) thumb needs to be frozen       Visit Vitals  /60 (BP 1 Location: Left upper arm, BP Patient Position: Sitting, BP Cuff Size: Small adult)   Pulse 71   Temp 97.8 °F (36.6 °C) (Temporal)   Resp 18   Ht 5' 6\" (1.676 m)   Wt 124 lb 6.4 oz (56.4 kg)   SpO2 96%   BMI 20.08 kg/m²     Pain Scale: 0 - No pain/10  Pain Location:     1. Have you been to the ER, urgent care clinic since your last visit? Hospitalized since your last visit? NO    2. Have you seen or consulted any other health care providers outside of the [facility] since your last visit? Include any pap smears or colon screening. NO    Symptom review:  NO  Fever   NO  Shaking chills  NO  Cough  NO  Body aches  NO  Coughing up blood  NO  Chest congestion  NO  Chest pain  NO  Shortness of breath  NO  Profound Loss of smell/taste  NO  Nausea/Vomiting   NO  Loose stool/Diarrhea  NO  any skin issues    Patient Risk Factors Reviewed as follows:  NO  have you been in Close contact with confirmed COVID19 patient   NO  History of recent travel to affected geographical areas within the past 14 days  NO  COPD  NO  Active Cancer/Leukemia/Lymphoma/Chemotherapy  NO  Oral steroid use  NO  Pregnant  NO  Diabetes Mellitus  YES  Heart disease  NO  Asthma  NO Health care worker at home  [address] care worker  NO Is there a Pregnant Woman in the home  NO Dialysis pt in the home   NO a large number of people living in the home    Learning Assessment 7/19/2021   PRIMARY LEARNER Patient   PRIMARY LANGUAGE ENGLISH   LEARNER PREFERENCE PRIMARY DEMONSTRATION   ANSWERED BY patient   RELATIONSHIP SELF     Fall Risk Assessment, last 12 mths 12/9/2021   Able to walk?  Yes   Fall in past 12 months? 0   Do you feel unsteady? 0   Are you worried about falling 0   Is the gait abnormal? -   Number of falls in past 12 months -   Fall with injury? -       3 most recent PHQ Screens 12/9/2021   PHQ Not Done -   Little interest or pleasure in doing things Not at all   Feeling down, depressed, irritable, or hopeless Not at all   Total Score PHQ 2 0     Abuse Screening Questionnaire 12/9/2021   Do you ever feel afraid of your partner? N   Are you in a relationship with someone who physically or mentally threatens you? N   Is it safe for you to go home?  Y       ADL Assessment 12/9/2021   Feeding yourself No Help Needed   Getting from bed to chair No Help Needed   Getting dressed No Help Needed   Bathing or showering No Help Needed   Walk across the room (includes cane/walker) No Help Needed   Using the telphone No Help Needed   Taking your medications No Help Needed   Preparing meals No Help Needed   Managing money (expenses/bills) No Help Needed   Moderately strenuous housework (laundry) No Help Needed   Shopping for personal items (toiletries/medicines) No Help Needed   Shopping for groceries No Help Needed   Driving No Help Needed   Climbing a flight of stairs No Help Needed   Getting to places beyond walking distances No Help Needed      Advance Care Planning 8/5/2021   Patient's Healthcare Decision Maker is: -   Confirm Advance Directive Yes, on file

## 2021-12-10 LAB
ANION GAP SERPL CALC-SCNC: 6 MMOL/L (ref 5–15)
BUN SERPL-MCNC: 22 MG/DL (ref 6–20)
BUN/CREAT SERPL: 36 (ref 12–20)
CALCIUM SERPL-MCNC: 9.3 MG/DL (ref 8.5–10.1)
CHLORIDE SERPL-SCNC: 105 MMOL/L (ref 97–108)
CO2 SERPL-SCNC: 28 MMOL/L (ref 21–32)
CREAT SERPL-MCNC: 0.61 MG/DL (ref 0.55–1.02)
GLUCOSE SERPL-MCNC: 67 MG/DL (ref 65–100)
POTASSIUM SERPL-SCNC: 3.9 MMOL/L (ref 3.5–5.1)
SODIUM SERPL-SCNC: 139 MMOL/L (ref 136–145)

## 2021-12-30 ENCOUNTER — OFFICE VISIT (OUTPATIENT)
Dept: FAMILY MEDICINE CLINIC | Age: 71
End: 2021-12-30
Payer: MEDICARE

## 2021-12-30 ENCOUNTER — NURSE TRIAGE (OUTPATIENT)
Dept: OTHER | Facility: CLINIC | Age: 71
End: 2021-12-30

## 2021-12-30 VITALS — TEMPERATURE: 97.8 F | OXYGEN SATURATION: 97 % | HEART RATE: 67 BPM

## 2021-12-30 DIAGNOSIS — R05.9 COUGH: Primary | ICD-10-CM

## 2021-12-30 PROCEDURE — 99213 OFFICE O/P EST LOW 20 MIN: CPT | Performed by: INTERNAL MEDICINE

## 2021-12-30 RX ORDER — AZITHROMYCIN 250 MG/1
TABLET, FILM COATED ORAL
Qty: 6 TABLET | Refills: 0 | Status: SHIPPED | OUTPATIENT
Start: 2021-12-30 | End: 2022-01-20 | Stop reason: SDUPTHER

## 2021-12-30 RX ORDER — PREDNISONE 20 MG/1
20 TABLET ORAL
Qty: 5 TABLET | Refills: 0 | Status: SHIPPED | OUTPATIENT
Start: 2021-12-30 | End: 2022-01-20 | Stop reason: SDUPTHER

## 2021-12-30 NOTE — TELEPHONE ENCOUNTER
Received call from Mayo  at [facility] with Red Flag Complaint. Subjective: Caller states laryngitis     Current Symptoms:   Cough, congestion ,  Yellow mucus , fausto brown  Mucus  Pt took a covid test  It was negative       Onset: 5 days ago; gradual    Associated Symptoms:   Headache     Pain Severity:  Slight headache     Temperature:  Denies   98.1  What has been tried:  Delsym  Cough syrup     LMP: NA Pregnant: No    Recommended disposition:  See  In the office today     Care advice provided, patient verbalizes understanding; denies any other questions or concerns; instructed to call back for any new or worsening symptoms. Writer provided warm transfer to Tatum at [facility] for appointment scheduling    Attention Provider: Thank you for allowing me to participate in the care of your patient. The patient was connected to triage in response to information provided to the [facility]. Please do not respond through this encounter as the response is not directed to a shared pool.       Reason for Disposition   Coughing up fausto-colored (reddish-brown) or blood-tinged sputum    Protocols used: COUGH-ADULT-OH

## 2021-12-30 NOTE — PROGRESS NOTES
Ms. Jones is a 66 y.o. female who is here for evaluation of   Chief Complaint   Patient presents with    Cold Symptoms     C/O sore throat, denies fever chills. C/O sinus congestion and drainage. Previously around sick grand child that was negative     Laryngitis   . ASSESSMENT AND PLAN:    1. Cough  Likely has RSV given her recent exposure to an ill infant who was triple negative for Covid, flu and strep. Advised to practice voice rest for the next 48 hours and hydrate. Azithromycin and prednisone prescribed. - azithromycin (ZITHROMAX) 250 mg tablet; Take 2 tablets today, then take 1 tablet daily  Dispense: 6 Tablet; Refill: 0  - predniSONE (DELTASONE) 20 mg tablet; Take 20 mg by mouth daily (with breakfast). Dispense: 5 Tablet; Refill: 0      Orders Placed This Encounter    azithromycin (ZITHROMAX) 250 mg tablet     Sig: Take 2 tablets today, then take 1 tablet daily     Dispense:  6 Tablet     Refill:  0    predniSONE (DELTASONE) 20 mg tablet     Sig: Take 20 mg by mouth daily (with breakfast). 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 No fever. Recent exposure to a youngster with fever and congestion who tested negative for Covid. ROS:  Denies chest pain, SOB,  nausea, vomiting, or diarrhea. Denies wt loss, wt gain, hemoptysis, hematochezia or melena. Physical Examination:    Visit Vitals  Pulse 67   Temp 97.8 °F (36.6 °C) (Temporal)   SpO2 97%      General:  Alert, cooperative, no distress. Head:  Normocephalic, without obvious abnormality, atraumatic. Eyes:  Conjunctivae/corneas clear. Pupils equal, round, reactive to light. Extraocular movements intact. Lungs:   Clear to auscultation bilaterally. Respirations are unlabored   Chest wall:  No tenderness or deformity. Cardiac:   RRR   Abdomen:   Soft, non-tender.  Bowel sounds normal. No masses. No organomegaly. Extremities: Extremities normal, atraumatic, no cyanosis or edema. Pulses: 2+ and symmetric all extremities. Skin: Skin color, texture, turgor normal. No rashes or lesions. Lymph nodes: Cervical, supraclavicular, and axillary nodes normal.   Neurologic: CNII-XII intact. Normal strength, sensation, and reflexes throughout. On this date 12/30/2021 I have spent 20 minutes reviewing previous notes, test results and face to face with the patient discussing the diagnosis and importance of compliance with the treatment plan as well as documenting on the day of the visit.     Jose Cevallos MD FACP    (signed electronically) on 12/30/2021 at 10:02 AM

## 2021-12-30 NOTE — PROGRESS NOTES
Sri Viramontes is a 66 y.o. female presenting for/with:    Chief Complaint   Patient presents with    Cold Symptoms     C/O sore throat, denies fever chills. C/O sinus congestion and drainage. Previously around sick grand child that was negative     Laryngitis       Visit Vitals  Pulse 67   Temp 97.8 °F (36.6 °C) (Temporal)   SpO2 97%     Pain Scale: /10  Pain Location:     1. Have you been to the ER, urgent care clinic since your last visit? Hospitalized since your last visit? NO    2. Have you seen or consulted any other health care providers outside of the [address] [place] [address] since your last visit? Include any pap smears or colon screening. NO    Symptom review:  NO  Fever   NO  Shaking chills  YES  Cough  NO  Body aches  NO  Coughing up blood  NO  Chest congestion  NO  Chest pain  NO  Shortness of breath  NO  Profound Loss of smell/taste  NO  Nausea/Vomiting   NO  Loose stool/Diarrhea  NO  any skin issues    Patient Risk Factors Reviewed as follows:  NO  have you been in Close contact with confirmed COVID19 patient   NO  History of recent travel to affected geographical areas within the past 14 days  NO  COPD  NO  Active Cancer/Leukemia/Lymphoma/Chemotherapy  NO  Oral steroid use  NO  Pregnant  NO  Diabetes Mellitus  YES  Heart disease  NO  Asthma  NO Health care worker at home  [address] care worker  NO Is there a Pregnant Woman in the home  NO Dialysis pt in the home   NO a large number of people living in the home    Learning Assessment 7/19/2021   PRIMARY LEARNER Patient   PRIMARY LANGUAGE ENGLISH   LEARNER PREFERENCE PRIMARY DEMONSTRATION   ANSWERED BY patient   RELATIONSHIP SELF     Fall Risk Assessment, last 12 mths 12/9/2021   Able to walk? Yes   Fall in past 12 months? 0   Do you feel unsteady? 0   Are you worried about falling 0   Is the gait abnormal? -   Number of falls in past 12 months -   Fall with injury?  -       3 most recent PHQ Screens 12/9/2021   PHQ Not Done -   Little interest or pleasure in doing things Not at all   Feeling down, depressed, irritable, or hopeless Not at all   Total Score PHQ 2 0     Abuse Screening Questionnaire 12/9/2021   Do you ever feel afraid of your partner? N   Are you in a relationship with someone who physically or mentally threatens you? N   Is it safe for you to go home?  Y       ADL Assessment 12/9/2021   Feeding yourself No Help Needed   Getting from bed to chair No Help Needed   Getting dressed No Help Needed   Bathing or showering No Help Needed   Walk across the room (includes cane/walker) No Help Needed   Using the telphone No Help Needed   Taking your medications No Help Needed   Preparing meals No Help Needed   Managing money (expenses/bills) No Help Needed   Moderately strenuous housework (laundry) No Help Needed   Shopping for personal items (toiletries/medicines) No Help Needed   Shopping for groceries No Help Needed   Driving No Help Needed   Climbing a flight of stairs No Help Needed   Getting to places beyond walking distances No Help Needed      Advance Care Planning 8/5/2021   Patient's Healthcare Decision Maker is: -   Confirm Advance Directive Yes, on file

## 2022-01-04 ENCOUNTER — TELEPHONE (OUTPATIENT)
Dept: FAMILY MEDICINE CLINIC | Age: 72
End: 2022-01-04

## 2022-01-04 NOTE — TELEPHONE ENCOUNTER
----- Message from Sherry Judy sent at 1/4/2022  9:27 AM EST -----  Subject: Message to Provider    QUESTIONS  Information for Provider? Patient is requesting to speak with someone in   the office to verify if her and her  should extend the medication   that was given to them on 12/30. Please contact   ---------------------------------------------------------------------------  --------------  CALL BACK INFO  What is the best way for the office to contact you? OK to leave message on   voicemail  Preferred Call Back Phone Number? [phone]  ---------------------------------------------------------------------------  --------------  SCRIPT ANSWERS  Relationship to Patient?  Self

## 2022-01-04 NOTE — TELEPHONE ENCOUNTER
Explained purpose of steroids and antibiotic treatment. Patient explains understanding. Patient and  will follow up after 10 days if still feeling poorly.

## 2022-01-20 ENCOUNTER — OFFICE VISIT (OUTPATIENT)
Dept: FAMILY MEDICINE CLINIC | Age: 72
End: 2022-01-20
Payer: MEDICARE

## 2022-01-20 VITALS — OXYGEN SATURATION: 97 % | RESPIRATION RATE: 18 BRPM | TEMPERATURE: 98.7 F | HEART RATE: 90 BPM

## 2022-01-20 DIAGNOSIS — R05.9 COUGH: ICD-10-CM

## 2022-01-20 PROCEDURE — 99213 OFFICE O/P EST LOW 20 MIN: CPT | Performed by: INTERNAL MEDICINE

## 2022-01-20 RX ORDER — AZITHROMYCIN 250 MG/1
TABLET, FILM COATED ORAL
Qty: 6 TABLET | Refills: 0 | Status: SHIPPED | OUTPATIENT
Start: 2022-01-20 | End: 2022-03-16 | Stop reason: ALTCHOICE

## 2022-01-20 RX ORDER — PREDNISONE 20 MG/1
20 TABLET ORAL
Qty: 5 TABLET | Refills: 0 | Status: SHIPPED | OUTPATIENT
Start: 2022-01-20 | End: 2022-03-16 | Stop reason: ALTCHOICE

## 2022-01-20 NOTE — PROGRESS NOTES
Ms. Jones is a 66 y.o. female who is here for evaluation of   Chief Complaint   Patient presents with    Cough     C/O x1 episode of coughing with \"rust\" colored discharge. Occured first thing this morning after having a tickle in throat causing her to cough. C/O upper cough but overall feeling better   . ASSESSMENT AND PLAN:    1. Cough  She has increased breath sounds in the left lower lobe with crackles consistent with an early pneumonia. Begin a azithromycin and prednisone with close follow-up. Expect improvement over the next 3 days and if symptoms worsen, return to clinic or go to the ER.  - azithromycin (ZITHROMAX) 250 mg tablet; Take 2 tablets today, then take 1 tablet daily  Dispense: 6 Tablet; Refill: 0  - predniSONE (DELTASONE) 20 mg tablet; Take 20 mg by mouth daily (with breakfast). Dispense: 5 Tablet; Refill: 0      Orders Placed This Encounter    azithromycin (ZITHROMAX) 250 mg tablet     Sig: Take 2 tablets today, then take 1 tablet daily     Dispense:  6 Tablet     Refill:  0    predniSONE (DELTASONE) 20 mg tablet     Sig: Take 20 mg by mouth daily (with breakfast). Dispense:  5 Tablet     Refill:  0           HPI 76-year-old woman who has recently been treated 3-1/2 weeks ago for upper respiratory illness presents to clinic today with a complaint of persistent cough productive of fausto sputum this morning for the first time. Denies fever or chills. ROS:  Denies SOB,  nausea, vomiting, or diarrhea. Denies wt loss, wt gain, hemoptysis, hematochezia or melena. Physical Examination:    Visit Vitals  Pulse 90   Temp 98.7 °F (37.1 °C) (Temporal)   Resp 18   SpO2 97%      General:  Alert, cooperative, no distress. Head:  Normocephalic, without obvious abnormality, atraumatic. Eyes:  Conjunctivae/corneas clear. Pupils equal, round, reactive to light. Extraocular movements intact.    Lungs:    Increased breath sounds with crackles in the left lower lobe posteriorly otherwise normal lung exam with unlabored respirations. Chest wall:  No tenderness or deformity. Cardiac:  RRR without MGR. Abdomen:   Soft, non-tender. Bowel sounds normal. No masses. No organomegaly. Extremities: Extremities normal, atraumatic, no cyanosis or edema. Pulses: 2+ and symmetric all extremities. Skin: Skin color, texture, turgor normal. No rashes or lesions. Lymph nodes: Cervical, supraclavicular, and axillary nodes normal.   Neurologic: CNII-XII intact. Normal strength, sensation, and reflexes throughout. On this date 01/20/2022 I have spent 20 minutes reviewing previous notes, test results and face to face with the patient discussing the diagnosis and importance of compliance with the treatment plan as well as documenting on the day of the visit.     Maryann Morton MD FACP    (signed electronically) on 1/20/2022 at 9:21 AM

## 2022-01-20 NOTE — PROGRESS NOTES
Rosy Beasley is a 66 y.o. female presenting for/with:    Chief Complaint   Patient presents with    Cough     C/O x1 episode of coughing with \"rust\" colored discharge. Occured first thing this morning after having a tickle in throat causing her to cough. C/O upper cough but overall feeling better       Visit Vitals  Pulse 90   Temp 98.7 °F (37.1 °C) (Temporal)   Resp 18   SpO2 97%     Pain Scale: /10  Pain Location:     1. Have you been to the ER, urgent care clinic since your last visit? Hospitalized since your last visit? NO    2. Have you seen or consulted any other health care providers outside of the [address] [place] [address] since your last visit? Include any pap smears or colon screening. NO    Symptom review:  NO  Fever   NO  Shaking chills  NO  Cough  NO  Body aches  YES  Coughing up blood  YES  Chest congestion  NO  Chest pain  NO  Shortness of breath  NO  Profound Loss of smell/taste  NO  Nausea/Vomiting   NO  Loose stool/Diarrhea  NO  any skin issues    Patient Risk Factors Reviewed as follows:  NO  have you been in Close contact with confirmed COVID19 patient   NO  History of recent travel to affected geographical areas within the past 14 days  NO  COPD  NO  Active Cancer/Leukemia/Lymphoma/Chemotherapy  NO  Oral steroid use  NO  Pregnant  NO  Diabetes Mellitus  YES  Heart disease  NO  Asthma  NO Health care worker at home  [address] care worker  NO Is there a Pregnant Woman in the home  NO Dialysis pt in the home   NO a large number of people living in the home    Learning Assessment 7/19/2021   PRIMARY LEARNER Patient   PRIMARY LANGUAGE ENGLISH   LEARNER PREFERENCE PRIMARY DEMONSTRATION   ANSWERED BY patient   RELATIONSHIP SELF     Fall Risk Assessment, last 12 mths 12/9/2021   Able to walk? Yes   Fall in past 12 months? 0   Do you feel unsteady? 0   Are you worried about falling 0   Is the gait abnormal? -   Number of falls in past 12 months -   Fall with injury?  -       3 most recent PHQ Screens 1/20/2022   PHQ Not Done -   Little interest or pleasure in doing things Not at all   Feeling down, depressed, irritable, or hopeless Not at all   Total Score PHQ 2 0     Abuse Screening Questionnaire 12/9/2021   Do you ever feel afraid of your partner? N   Are you in a relationship with someone who physically or mentally threatens you? N   Is it safe for you to go home?  Y       ADL Assessment 12/9/2021   Feeding yourself No Help Needed   Getting from bed to chair No Help Needed   Getting dressed No Help Needed   Bathing or showering No Help Needed   Walk across the room (includes cane/walker) No Help Needed   Using the telphone No Help Needed   Taking your medications No Help Needed   Preparing meals No Help Needed   Managing money (expenses/bills) No Help Needed   Moderately strenuous housework (laundry) No Help Needed   Shopping for personal items (toiletries/medicines) No Help Needed   Shopping for groceries No Help Needed   Driving No Help Needed   Climbing a flight of stairs No Help Needed   Getting to places beyond walking distances No Help Needed      Advance Care Planning 8/5/2021   Patient's Healthcare Decision Maker is: -   Confirm Advance Directive Yes, on file

## 2022-01-28 NOTE — PROGRESS NOTES
Tyra Zarate is a 66 y.o. female evaluated via telephone on 1/31/2022. Consent:  She and/or health care decision maker is aware that that she may receive a bill for this telephone service, depending on her insurance coverage, and has provided verbal consent to proceed: Yes    A/P:  1. Cough  Cough is better and I suspect her dizziness and lightheadedness are related to her electrolytes given her recent increased water intake and use of hydrochlorothiazide for hypertension. Her pounding heartbeat and palpitations are likely due to her use of prednisone and a azithromycin which she received twice in the month of January. She should expect gradual improvement over the next 3 weeks and certainly by 21 February she should be just about back to normal.        HPI: 79-year-old woman with congenital nystagmus who was last evaluated in the office on January 20 with suspected pneumonia. Placed on azithromycin and prednisone at that time. She has experienced palpitations occasionally since starting her azithromycin. Some dizziness and lightheaded feeling. Notes that she has increased her water intake and her appetite is unchanged. Blood pressure has been running between 140 and 160. I affirm this is a Patient Initiated Episode with an Established Patient who has not had a related appointment within my department in the past 7 days or scheduled within the next 24 hours. On this date 01/31/2022 I have spent 21 minutes reviewing previous notes, test results and face to face with the patient discussing the diagnosis and importance of compliance with the treatment plan as well as documenting on the day of the visit.      Note: not billable if this call serves to triage the patient into an appointment for the relevant concern      Mehnaz Bunch MD

## 2022-01-31 ENCOUNTER — VIRTUAL VISIT (OUTPATIENT)
Dept: FAMILY MEDICINE CLINIC | Age: 72
End: 2022-01-31
Payer: MEDICARE

## 2022-01-31 DIAGNOSIS — R05.9 COUGH: Primary | ICD-10-CM

## 2022-01-31 PROCEDURE — G2025 DIS SITE TELE SVCS RHC/FQHC: HCPCS | Performed by: INTERNAL MEDICINE

## 2022-01-31 NOTE — PROGRESS NOTES
Agueda Cox is a 66 y.o. female presenting for/with:    Chief Complaint   Patient presents with    Hypertension     Reports stopping labetalol x 1 week ago and since BP readings have settled. Currently running [id number]    Dizziness     C/O intermittent dizziness. Denies only when standing or changing positions. Denies change in occurances since stopping the labetalol.  Incontinence     C/O \"passive INC\" has occured x3. Does not know that she had episodes of stool in her underwear. There were no vitals taken for this visit. Pain Scale: /10  Pain Location:     1. Have you been to the ER, urgent care clinic since your last visit? Hospitalized since your last visit? NO    2. Have you seen or consulted any other health care providers outside of the [address] [place] [address] since your last visit? Include any pap smears or colon screening. NO    Symptom review:  NO  Fever   NO  Shaking chills  NO  Cough  NO  Body aches  NO  Coughing up blood  NO  Chest congestion  NO  Chest pain  NO  Shortness of breath  NO  Profound Loss of smell/taste  NO  Nausea/Vomiting   NO  Loose stool/Diarrhea  NO  any skin issues    Patient Risk Factors Reviewed as follows:  NO  have you been in Close contact with confirmed COVID19 patient   NO  History of recent travel to affected geographical areas within the past 14 days  NO  COPD  NO  Active Cancer/Leukemia/Lymphoma/Chemotherapy  NO  Oral steroid use  NO  Pregnant  NO  Diabetes Mellitus  NO  Heart disease  NO  Asthma  NO Health care worker at home  NO Health care worker  NO Is there a Pregnant Woman in the home  NO Dialysis pt in the home   NO a large number of people living in the home    Learning Assessment 7/19/2021   PRIMARY LEARNER Patient   PRIMARY LANGUAGE ENGLISH   LEARNER PREFERENCE PRIMARY DEMONSTRATION   ANSWERED BY patient   RELATIONSHIP SELF     Fall Risk Assessment, last 12 mths 12/9/2021   Able to walk? Yes   Fall in past 12 months? 0   Do you feel unsteady?  0 Are you worried about falling 0   Is the gait abnormal? -   Number of falls in past 12 months -   Fall with injury? -       3 most recent PHQ Screens 1/20/2022   PHQ Not Done -   Little interest or pleasure in doing things Not at all   Feeling down, depressed, irritable, or hopeless Not at all   Total Score PHQ 2 0     Abuse Screening Questionnaire 12/9/2021   Do you ever feel afraid of your partner? N   Are you in a relationship with someone who physically or mentally threatens you? N   Is it safe for you to go home?  Y       ADL Assessment 12/9/2021   Feeding yourself No Help Needed   Getting from bed to chair No Help Needed   Getting dressed No Help Needed   Bathing or showering No Help Needed   Walk across the room (includes cane/walker) No Help Needed   Using the telphone No Help Needed   Taking your medications No Help Needed   Preparing meals No Help Needed   Managing money (expenses/bills) No Help Needed   Moderately strenuous housework (laundry) No Help Needed   Shopping for personal items (toiletries/medicines) No Help Needed   Shopping for groceries No Help Needed   Driving No Help Needed   Climbing a flight of stairs No Help Needed   Getting to places beyond walking distances No Help Needed      Advance Care Planning 8/5/2021   Patient's Healthcare Decision Maker is: -   Confirm Advance Directive Yes, on file

## 2022-02-08 ENCOUNTER — TELEPHONE (OUTPATIENT)
Dept: FAMILY MEDICINE CLINIC | Age: 72
End: 2022-02-08

## 2022-02-08 NOTE — TELEPHONE ENCOUNTER
Received notice from [facility] that patients losartan/hctz 50/12.5mg is out of stock. Requesting to transfer script to outside pharmacy OR to fill valsartan/hctz, olmesartan/hctz or irbesartan/hctz.  Message sent to patient explained options to transfer

## 2022-02-10 ENCOUNTER — HOSPITAL ENCOUNTER (OUTPATIENT)
Dept: ULTRASOUND IMAGING | Age: 72
Discharge: HOME | End: 2022-02-10
Attending: OTOLARYNGOLOGY
Payer: MEDICARE

## 2022-02-10 DIAGNOSIS — E04.1 THYROID NODULE: ICD-10-CM

## 2022-02-10 PROCEDURE — 76536 US EXAM OF HEAD AND NECK: CPT

## 2022-02-17 ENCOUNTER — TRANSCRIBE ORDER (OUTPATIENT)
Dept: SCHEDULING | Age: 72
End: 2022-02-17

## 2022-02-17 DIAGNOSIS — E04.1 THYROID NODULE: Primary | ICD-10-CM

## 2022-03-01 ENCOUNTER — HOSPITAL ENCOUNTER (OUTPATIENT)
Dept: ULTRASOUND IMAGING | Age: 72
Discharge: HOME | End: 2022-03-01
Attending: OTOLARYNGOLOGY
Payer: MEDICARE

## 2022-03-01 ENCOUNTER — PATIENT MESSAGE (OUTPATIENT)
Dept: FAMILY MEDICINE CLINIC | Age: 72
End: 2022-03-01

## 2022-03-01 DIAGNOSIS — E04.1 THYROID NODULE: ICD-10-CM

## 2022-03-01 PROCEDURE — 10005 FNA BX W/US GDN 1ST LES: CPT

## 2022-03-01 PROCEDURE — 74011000250 HC RX REV CODE- 250: Performed by: PHYSICIAN ASSISTANT

## 2022-03-01 PROCEDURE — 88173 CYTOPATH EVAL FNA REPORT: CPT

## 2022-03-01 PROCEDURE — 77030014115

## 2022-03-01 PROCEDURE — 88172 CYTP DX EVAL FNA 1ST EA SITE: CPT

## 2022-03-01 RX ORDER — SODIUM BICARBONATE 42 MG/ML
1 INJECTION, SOLUTION INTRAVENOUS
Status: COMPLETED | OUTPATIENT
Start: 2022-03-01 | End: 2022-03-01

## 2022-03-01 RX ORDER — LIDOCAINE HYDROCHLORIDE 10 MG/ML
10 INJECTION, SOLUTION EPIDURAL; INFILTRATION; INTRACAUDAL; PERINEURAL
Status: COMPLETED | OUTPATIENT
Start: 2022-03-01 | End: 2022-03-01

## 2022-03-01 RX ADMIN — LIDOCAINE HYDROCHLORIDE 8 ML: 10 INJECTION, SOLUTION EPIDURAL; INFILTRATION; INTRACAUDAL; PERINEURAL at 14:30

## 2022-03-01 RX ADMIN — SODIUM BICARBONATE 1 ML: 42 INJECTION, SOLUTION INTRAVENOUS at 14:30

## 2022-03-02 ENCOUNTER — HOSPITAL ENCOUNTER (EMERGENCY)
Age: 72
Discharge: HOME | End: 2022-03-02
Attending: EMERGENCY MEDICINE
Payer: MEDICARE

## 2022-03-02 VITALS
WEIGHT: 125 LBS | TEMPERATURE: 99.3 F | HEART RATE: 77 BPM | HEIGHT: 66 IN | OXYGEN SATURATION: 97 % | RESPIRATION RATE: 22 BRPM | DIASTOLIC BLOOD PRESSURE: 73 MMHG | SYSTOLIC BLOOD PRESSURE: 118 MMHG | BODY MASS INDEX: 20.09 KG/M2

## 2022-03-02 DIAGNOSIS — I49.1 PAC (PREMATURE ATRIAL CONTRACTION): Primary | ICD-10-CM

## 2022-03-02 LAB
ANION GAP SERPL CALC-SCNC: 6 MMOL/L (ref 5–15)
BASOPHILS # BLD: 0 K/UL (ref 0–0.1)
BASOPHILS NFR BLD: 0 % (ref 0–1)
BUN SERPL-MCNC: 23 MG/DL (ref 6–20)
BUN/CREAT SERPL: 31 (ref 12–20)
CALCIUM SERPL-MCNC: 9 MG/DL (ref 8.5–10.1)
CHLORIDE SERPL-SCNC: 106 MMOL/L (ref 97–108)
CO2 SERPL-SCNC: 30 MMOL/L (ref 21–32)
CREAT SERPL-MCNC: 0.74 MG/DL (ref 0.55–1.02)
DIFFERENTIAL METHOD BLD: ABNORMAL
EOSINOPHIL # BLD: 0.1 K/UL (ref 0–0.4)
EOSINOPHIL NFR BLD: 2 % (ref 0–7)
ERYTHROCYTE [DISTWIDTH] IN BLOOD BY AUTOMATED COUNT: 12.8 % (ref 11.5–14.5)
GLUCOSE SERPL-MCNC: 165 MG/DL (ref 65–100)
HCT VFR BLD AUTO: 35.3 % (ref 35–47)
HGB BLD-MCNC: 11.6 G/DL (ref 11.5–16)
IMM GRANULOCYTES # BLD AUTO: 0 K/UL (ref 0–0.04)
IMM GRANULOCYTES NFR BLD AUTO: 0 % (ref 0–0.5)
LYMPHOCYTES # BLD: 2.1 K/UL (ref 0.8–3.5)
LYMPHOCYTES NFR BLD: 41 % (ref 12–49)
MCH RBC QN AUTO: 32.3 PG (ref 26–34)
MCHC RBC AUTO-ENTMCNC: 32.9 G/DL (ref 30–36.5)
MCV RBC AUTO: 98.3 FL (ref 80–99)
MONOCYTES # BLD: 0.5 K/UL (ref 0–1)
MONOCYTES NFR BLD: 9 % (ref 5–13)
NEUTS SEG # BLD: 2.5 K/UL (ref 1.8–8)
NEUTS SEG NFR BLD: 48 % (ref 32–75)
NRBC # BLD: 0 K/UL (ref 0–0.01)
NRBC BLD-RTO: 0 PER 100 WBC
PLATELET # BLD AUTO: 184 K/UL (ref 150–400)
PMV BLD AUTO: 11.6 FL (ref 8.9–12.9)
POTASSIUM SERPL-SCNC: 3.3 MMOL/L (ref 3.5–5.1)
RBC # BLD AUTO: 3.59 M/UL (ref 3.8–5.2)
SODIUM SERPL-SCNC: 142 MMOL/L (ref 136–145)
WBC # BLD AUTO: 5.2 K/UL (ref 3.6–11)

## 2022-03-02 PROCEDURE — 85025 COMPLETE CBC W/AUTO DIFF WBC: CPT

## 2022-03-02 PROCEDURE — 93005 ELECTROCARDIOGRAM TRACING: CPT

## 2022-03-02 PROCEDURE — 99284 EMERGENCY DEPT VISIT MOD MDM: CPT

## 2022-03-02 PROCEDURE — 36415 COLL VENOUS BLD VENIPUNCTURE: CPT

## 2022-03-02 PROCEDURE — 80048 BASIC METABOLIC PNL TOTAL CA: CPT

## 2022-03-02 NOTE — ED PROVIDER NOTES
EMERGENCY DEPARTMENT HISTORY AND PHYSICAL EXAM          Date: 3/2/2022  Patient Name: Yang Jones    History of Presenting Illness     Chief Complaint   Patient presents with    Hypertension       History Provided By: Patient    HPI: Aurora Mercado is a 66 y.o. female, pmhx listed below, who presents to the ED c/o abnormal blood pressure and arrhythmia. Patient reports she has been taking her blood pressure on her home monitor and noting levels between 70/50 and 160/100. States she is asymptomatic when the blood pressures, reports she takes her blood pressure at home approximately 7 times per day. States the machine is also been detecting an arrhythmia. Patient denies near syncope, syncope, headache, chest pain. States she has been under stress due to a child's marriage difficulties. Reports she recently increased her dose of labetalol but has been on both labetalol and losartan for several months. PCP: Sera Fabian MD    There are no other complaints, changes, or physical findings at this time.          Past History       Past Medical History:  Past Medical History:   Diagnosis Date    Basal cell carcinoma     excised from nose and clavicl    Cerumen impaction     Endocrine disorder     Hypertension     Menopause     age 47    Nystagmus     OA (osteoarthritis) of finger     Peripheral sensory neuropathy age 46    mild    Thyroid nodule     UTI (lower urinary tract infection)     recurrent       Past Surgical History:  Past Surgical History:   Procedure Laterality Date    HX CATARACT REMOVAL Bilateral     lens implants    HX COLONOSCOPY  2008    normal    OH URETHRLYS, TRANSVAG W/ SCOPE      dilation       Family History:  Family History   Problem Relation Age of Onset    Breast Cancer Mother     Thyroid Cancer Sister     Breast Cancer Sister     Diabetes Sister         Peripheral neuropathy    Cancer Sister         Breast, CLL       Social History:  Social History     Tobacco Use    Smoking status: Never Smoker    Smokeless tobacco: Never Used   Substance Use Topics    Alcohol use: Yes     Alcohol/week: 2.0 standard drinks     Types: 2 Glasses of wine per week    Drug use: No       Current Outpatient Medications   Medication Sig Dispense Refill    azithromycin (ZITHROMAX) 250 mg tablet Take 2 tablets today, then take 1 tablet daily 6 Tablet 0    predniSONE (DELTASONE) 20 mg tablet Take 20 mg by mouth daily (with breakfast). 5 Tablet 0    labetaloL (NORMODYNE) 200 mg tablet Take 1 Tablet by mouth two (2) times a day. Indications: high blood pressure 180 Tablet 4    losartan-hydroCHLOROthiazide (HYZAAR) 50-12.5 mg per tablet TAKE 1 TABLET BY MOUTH  DAILY 90 Tablet 5    fluticasone propionate (FLONASE ALLERGY RELIEF) 50 mcg/actuation nasal spray 2 Sprays by Both Nostrils route daily. Allergies: Allergies   Allergen Reactions    Mollusks Nausea and Vomiting    Shellfish Derived Nausea and Vomiting     Mullusks    Sulfa (Sulfonamide Antibiotics) Other (comments)    Sulfur Hives         Review of Systems   Review of Systems   Constitutional: Negative for chills and fever. HENT: Negative for congestion. Eyes: Negative for pain. Respiratory: Negative for shortness of breath. Cardiovascular: Positive for palpitations. Negative for chest pain. Gastrointestinal: Negative for abdominal pain. Genitourinary: Negative for flank pain. Musculoskeletal: Negative for back pain. Neurological: Negative for headaches. Psychiatric/Behavioral: Negative for agitation. Physical Exam     Vital Signs-Reviewed the patient's vital signs.   Patient Vitals for the past 12 hrs:   Temp Pulse Resp BP SpO2   03/02/22 1720  77 22 118/73    03/02/22 1706  75 19  97 %   03/02/22 1701  72 12 (!) 127/55 98 %   03/02/22 1646  67 15 (!) 134/49 97 %   03/02/22 1639  71 19 (!) 141/93 98 %   03/02/22 1637  72 25 (!) 141/93 98 %   03/02/22 1625     98 %   03/02/22 1622 99.3 °F (37.4 °C) 72 18 (!) 152/99 98 %   03/02/22 1619    (!) 152/99        Physical Exam  Constitutional:       Appearance: Normal appearance. HENT:      Head: Normocephalic and atraumatic. Mouth/Throat:      Mouth: Mucous membranes are moist.   Eyes:      Pupils: Pupils are equal, round, and reactive to light. Cardiovascular:      Rate and Rhythm: Normal rate and regular rhythm. Pulmonary:      Effort: Pulmonary effort is normal.      Breath sounds: Normal breath sounds. Musculoskeletal:         General: No swelling. Skin:     General: Skin is warm and dry. Neurological:      Mental Status: She is alert and oriented to person, place, and time. Psychiatric:         Mood and Affect: Mood normal.         Diagnostic Study Results     Labs -     Recent Results (from the past 12 hour(s))   EKG, 12 LEAD, INITIAL    Collection Time: 03/02/22  4:23 PM   Result Value Ref Range    Ventricular Rate 69 BPM    Atrial Rate 69 BPM    P-R Interval 206 ms    QRS Duration 86 ms    Q-T Interval 400 ms    QTC Calculation (Bezet) 428 ms    Calculated P Axis 113 degrees    Calculated R Axis -167 degrees    Calculated T Axis 144 degrees    Diagnosis       ** Suspect arm lead reversal, interpretation assumes no reversal  Normal sinus rhythm  Anterolateral infarct , age undetermined  Abnormal ECG  No previous ECGs available     CBC WITH AUTOMATED DIFF    Collection Time: 03/02/22  4:43 PM   Result Value Ref Range    WBC 5.2 3.6 - 11.0 K/uL    RBC 3.59 (L) 3.80 - 5.20 M/uL    HGB 11.6 11.5 - 16.0 g/dL    HCT 35.3 35.0 - 47.0 %    MCV 98.3 80.0 - 99.0 FL    MCH 32.3 26.0 - 34.0 PG    MCHC 32.9 30.0 - 36.5 g/dL    RDW 12.8 11.5 - 14.5 %    PLATELET 525 900 - 097 K/uL    MPV 11.6 8.9 - 12.9 FL    NRBC 0.0 0  WBC    ABSOLUTE NRBC 0.00 0.00 - 0.01 K/uL    NEUTROPHILS 48 32 - 75 %    LYMPHOCYTES 41 12 - 49 %    MONOCYTES 9 5 - 13 %    EOSINOPHILS 2 0 - 7 %    BASOPHILS 0 0 - 1 %    IMMATURE GRANULOCYTES 0 0.0 - 0.5 %    ABS. NEUTROPHILS 2.5 1.8 - 8.0 K/UL    ABS. LYMPHOCYTES 2.1 0.8 - 3.5 K/UL    ABS. MONOCYTES 0.5 0.0 - 1.0 K/UL    ABS. EOSINOPHILS 0.1 0.0 - 0.4 K/UL    ABS. BASOPHILS 0.0 0.0 - 0.1 K/UL    ABS. IMM. GRANS. 0.0 0.00 - 0.04 K/UL    DF AUTOMATED     METABOLIC PANEL, BASIC    Collection Time: 03/02/22  4:43 PM   Result Value Ref Range    Sodium 142 136 - 145 mmol/L    Potassium 3.3 (L) 3.5 - 5.1 mmol/L    Chloride 106 97 - 108 mmol/L    CO2 30 21 - 32 mmol/L    Anion gap 6 5 - 15 mmol/L    Glucose 165 (H) 65 - 100 mg/dL    BUN 23 (H) 6 - 20 MG/DL    Creatinine 0.74 0.55 - 1.02 MG/DL    BUN/Creatinine ratio 31 (H) 12 - 20      GFR est AA >60 >60 ml/min/1.73m2    GFR est non-AA >60 >60 ml/min/1.73m2    Calcium 9.0 8.5 - 10.1 MG/DL       Radiologic Studies -   No orders to display     CT Results  (Last 48 hours)    None        CXR Results  (Last 48 hours)    None            EKG interpretation: (Preliminary)  Rhythm: NS, no ST elevation, narrow QRS, normal intervals  This EKG was interpreted by ED Provider Joselito Ramires MD        Medical Decision Making   I am the first provider for this patient. I reviewed the vital signs, available nursing notes, past medical history, past surgical history, family history and social history. Records Reviewed: Nursing Notes and Old Medical Records    Provider Notes (Medical Decision Making):   MDM: 79-year-old female presents to the emergency department for abnormal blood pressure readings on her home blood pressure monitor and perceived arrhythmia on home blood pressure monitor. Blood pressure now elevated. Patient is not feeling near syncopal or having symptoms when blood pressure monitor is reading 70/50 heart rate. This is more likely mechanical error than a true hypotensive episode. Will monitor in the emergency department now. Cardiac monitor noted PACs which is likely the arrhythmia BP monitor is sensing. Patient is in sinus rhythm. Initial assessment performed.  The patients presenting problems have been discussed, and they are in agreement with the care plan formulated and outlined with them. I have encouraged them to ask questions as they arise throughout their visit. PROGRESS NOTE:    Reviewed lab work with patient. Blood pressure within normal limits, no episodes of hypotension. Patient is feeling well. Will recommend follow-up with PCP and/or cardiology. Discharge note:  Pt re-evaluated and noted to be feeling better, ready for discharge. Updated pt on all final results. Will follow up as instructed. All questions have been answered, pt voiced understanding and agreement with plan. Specific return precautions provided as well as instructions to return to the ED should sx worsen at any time. Vital signs stable for discharge. Diagnosis     Clinical Impression:   1. PAC (premature atrial contraction)            Disposition:  Discharged    Discharge Medication List as of 3/2/2022  5:15 PM            Please note, this dictation was completed with [employer], the computer voice recognition software. Quite often unanticipated grammatical, syntax, homophones, and other interpretive errors are inadvertently transcribed by the computer software. Please disregard these errors. Please excuse any errors that have escaped final proof reading.

## 2022-03-02 NOTE — ED TRIAGE NOTES
Pt arrives with c/o fluctuating BP. She reports that her PCP sent her for BP check as well as an EKG for her \"arrythmia\" She denies any CP, but reports pain in her shoulder blades 2 mornings ago, that subsiding quickly.

## 2022-03-02 NOTE — ED NOTES
D/C instructions provided and reviewed with patient. Opportunity provided for discussion. All questions answered. Nothing further at this time.  [address] [facility]

## 2022-03-02 NOTE — TELEPHONE ENCOUNTER
See what the ER Doc says. If he wants you to be seen Friday that's OK. We will leave visit on and you can let us know. If he tells you to get in with your [facility] after Friday you can call and schedule. I am glad to hear everything is looking good.

## 2022-03-05 LAB
ATRIAL RATE: 69 BPM
CALCULATED P AXIS, ECG09: 113 DEGREES
CALCULATED R AXIS, ECG10: -167 DEGREES
CALCULATED T AXIS, ECG11: 144 DEGREES
DIAGNOSIS, 93000: NORMAL
P-R INTERVAL, ECG05: 206 MS
Q-T INTERVAL, ECG07: 400 MS
QRS DURATION, ECG06: 86 MS
QTC CALCULATION (BEZET), ECG08: 428 MS
VENTRICULAR RATE, ECG03: 69 BPM

## 2022-03-09 ENCOUNTER — HOSPITAL ENCOUNTER (OUTPATIENT)
Dept: MAMMOGRAPHY | Age: 72
Discharge: HOME | End: 2022-03-09
Attending: INTERNAL MEDICINE
Payer: MEDICARE

## 2022-03-09 DIAGNOSIS — R92.8 ABNORMAL MAMMOGRAM: ICD-10-CM

## 2022-03-09 PROCEDURE — 77065 DX MAMMO INCL CAD UNI: CPT

## 2022-03-14 ENCOUNTER — HOSPITAL ENCOUNTER (EMERGENCY)
Age: 72
Discharge: HOME | End: 2022-03-14
Attending: EMERGENCY MEDICINE
Payer: MEDICARE

## 2022-03-14 VITALS
RESPIRATION RATE: 17 BRPM | DIASTOLIC BLOOD PRESSURE: 85 MMHG | WEIGHT: 125 LBS | HEIGHT: 66 IN | SYSTOLIC BLOOD PRESSURE: 156 MMHG | TEMPERATURE: 97.8 F | BODY MASS INDEX: 20.09 KG/M2 | HEART RATE: 60 BPM | OXYGEN SATURATION: 96 %

## 2022-03-14 DIAGNOSIS — I95.1 ORTHOSTATIC HYPOTENSION: Primary | ICD-10-CM

## 2022-03-14 LAB
ALBUMIN SERPL-MCNC: 3.6 G/DL (ref 3.5–5)
ALBUMIN/GLOB SERPL: 1.1 {RATIO} (ref 1.1–2.2)
ALP SERPL-CCNC: 79 U/L (ref 45–117)
ALT SERPL-CCNC: 30 U/L (ref 12–78)
ANION GAP SERPL CALC-SCNC: 5 MMOL/L (ref 5–15)
AST SERPL-CCNC: 17 U/L (ref 15–37)
BASOPHILS # BLD: 0 K/UL (ref 0–0.1)
BASOPHILS NFR BLD: 1 % (ref 0–1)
BILIRUB SERPL-MCNC: 0.5 MG/DL (ref 0.2–1)
BUN SERPL-MCNC: 20 MG/DL (ref 6–20)
BUN/CREAT SERPL: 25 (ref 12–20)
CALCIUM SERPL-MCNC: 9.4 MG/DL (ref 8.5–10.1)
CHLORIDE SERPL-SCNC: 103 MMOL/L (ref 97–108)
CO2 SERPL-SCNC: 32 MMOL/L (ref 21–32)
COMMENT, HOLDF: NORMAL
CREAT SERPL-MCNC: 0.79 MG/DL (ref 0.55–1.02)
DIFFERENTIAL METHOD BLD: NORMAL
EOSINOPHIL # BLD: 0.1 K/UL (ref 0–0.4)
EOSINOPHIL NFR BLD: 2 % (ref 0–7)
ERYTHROCYTE [DISTWIDTH] IN BLOOD BY AUTOMATED COUNT: 12.8 % (ref 11.5–14.5)
GLOBULIN SER CALC-MCNC: 3.2 G/DL (ref 2–4)
GLUCOSE SERPL-MCNC: 138 MG/DL (ref 65–100)
HCT VFR BLD AUTO: 38.9 % (ref 35–47)
HGB BLD-MCNC: 12.9 G/DL (ref 11.5–16)
IMM GRANULOCYTES # BLD AUTO: 0 K/UL (ref 0–0.04)
IMM GRANULOCYTES NFR BLD AUTO: 0 % (ref 0–0.5)
LYMPHOCYTES # BLD: 1.8 K/UL (ref 0.8–3.5)
LYMPHOCYTES NFR BLD: 39 % (ref 12–49)
MCH RBC QN AUTO: 32.4 PG (ref 26–34)
MCHC RBC AUTO-ENTMCNC: 33.2 G/DL (ref 30–36.5)
MCV RBC AUTO: 97.7 FL (ref 80–99)
MONOCYTES # BLD: 0.4 K/UL (ref 0–1)
MONOCYTES NFR BLD: 9 % (ref 5–13)
NEUTS SEG # BLD: 2.3 K/UL (ref 1.8–8)
NEUTS SEG NFR BLD: 49 % (ref 32–75)
NRBC # BLD: 0 K/UL (ref 0–0.01)
NRBC BLD-RTO: 0 PER 100 WBC
PLATELET # BLD AUTO: 190 K/UL (ref 150–400)
PMV BLD AUTO: 10.7 FL (ref 8.9–12.9)
POTASSIUM SERPL-SCNC: 3.7 MMOL/L (ref 3.5–5.1)
PROT SERPL-MCNC: 6.8 G/DL (ref 6.4–8.2)
RBC # BLD AUTO: 3.98 M/UL (ref 3.8–5.2)
SAMPLES BEING HELD,HOLD: NORMAL
SODIUM SERPL-SCNC: 140 MMOL/L (ref 136–145)
TROPONIN-HIGH SENSITIVITY: 6 NG/L (ref 0–51)
WBC # BLD AUTO: 4.6 K/UL (ref 3.6–11)

## 2022-03-14 PROCEDURE — 96360 HYDRATION IV INFUSION INIT: CPT

## 2022-03-14 PROCEDURE — 93005 ELECTROCARDIOGRAM TRACING: CPT

## 2022-03-14 PROCEDURE — 85025 COMPLETE CBC W/AUTO DIFF WBC: CPT

## 2022-03-14 PROCEDURE — 99284 EMERGENCY DEPT VISIT MOD MDM: CPT

## 2022-03-14 PROCEDURE — 84484 ASSAY OF TROPONIN QUANT: CPT

## 2022-03-14 PROCEDURE — 80053 COMPREHEN METABOLIC PANEL: CPT

## 2022-03-14 PROCEDURE — 36415 COLL VENOUS BLD VENIPUNCTURE: CPT

## 2022-03-14 PROCEDURE — 74011250636 HC RX REV CODE- 250/636: Performed by: EMERGENCY MEDICINE

## 2022-03-14 RX ADMIN — SODIUM CHLORIDE 1000 ML: 9 INJECTION, SOLUTION INTRAVENOUS at 11:19

## 2022-03-14 NOTE — DISCHARGE INSTRUCTIONS
Stop taking your labetalol and losartan. Check your blood pressure every day at the same time and record your measurements. Follow-up with Dr. Sravanthi Sanders on Thursday to discuss restarting your losartan and/or labetalol. If you start to feel chest pain, shortness of breath, or like you are going to pass out, please come back to the emergency department immediately.

## 2022-03-14 NOTE — ED TRIAGE NOTES
Pt arrived by POV with complaint of ? Heart symptoms. Pt reports she may be having some heart symptoms but not sure, pt also reports she feels \"tight\" in her mid section and her arms on the way over to this facility has a \"weird sensation\". Pt also concerned her blood pressure at home was low but  Not sure if something is wrong with her machine. Pt is awake alert and oriented x 4.   Pt educated on ER flow

## 2022-03-14 NOTE — ED PROVIDER NOTES
EMERGENCY DEPARTMENT HISTORY AND PHYSICAL EXAM          Date: 3/14/2022  Patient Name: Mi Jones    History of Presenting Illness     Chief Complaint   Patient presents with    Chest Pain       History Provided By: Patient    HPI: Ata Tucker is a 66 y.o. female, pmhx listed below, who presents to the ED c/o low blood pressure. Patient was in the emergency department last week and has been noting one moment her blood pressure would be 70/50 and the next moment it would be 160/80. Reports that she has been increasingly noting blood pressures that are in the 06E systolic. When they occur, she has noticed that she is feeling lightheaded. No recent weight loss or medication changes. Denies diarrhea, vomiting, poor p.o. intake. Reports she feels well while sitting in the bed in the emergency department. Patient also reports she has been having strange sensations in her body. Felt substernal discomfort earlier this morning around her xiphoid process, felt bilateral arm tingling in the car on the way to the hospital.        PCP: Lydia Gilliam MD    There are no other complaints, changes, or physical findings at this time.          Past History       Past Medical History:  Past Medical History:   Diagnosis Date    Basal cell carcinoma     excised from nose and clavicl    Cerumen impaction     Endocrine disorder     Hypertension     Menopause     age 47    Nystagmus     OA (osteoarthritis) of finger     Peripheral sensory neuropathy age 46    mild    Thyroid nodule     UTI (lower urinary tract infection)     recurrent       Past Surgical History:  Past Surgical History:   Procedure Laterality Date    HX CATARACT REMOVAL Bilateral     lens implants    HX COLONOSCOPY  2008    normal    [place] URETHRLYS, TRANSVAG W/ SCOPE      dilation       Family History:  Family History   Problem Relation Age of Onset    Breast Cancer Mother         dx 80    Thyroid Cancer Sister     Breast Cancer Sister         dx age 42's    Diabetes Sister         Peripheral neuropathy    Cancer Sister         Breast, CLL       Social History:  Social History     Tobacco Use    Smoking status: Never Smoker    Smokeless tobacco: Never Used   Vaping Use    Vaping Use: Never used   Substance Use Topics    Alcohol use: Yes     Alcohol/week: 2.0 standard drinks     Types: 2 Glasses of wine per week    Drug use: No       Current Outpatient Medications   Medication Sig Dispense Refill    azithromycin (ZITHROMAX) 250 mg tablet Take 2 tablets today, then take 1 tablet daily 6 Tablet 0    predniSONE (DELTASONE) 20 mg tablet Take 20 mg by mouth daily (with breakfast). 5 Tablet 0    labetaloL (NORMODYNE) 200 mg tablet Take 1 Tablet by mouth two (2) times a day. Indications: high blood pressure 180 Tablet 4    losartan-hydroCHLOROthiazide (HYZAAR) 50-12.5 mg per tablet TAKE 1 TABLET BY MOUTH  DAILY 90 Tablet 5    fluticasone propionate (FLONASE ALLERGY RELIEF) 50 mcg/actuation nasal spray 2 Sprays by Both Nostrils route daily. Allergies: Allergies   Allergen Reactions    Mollusks Nausea and Vomiting    Shellfish Derived Nausea and Vomiting     Mullusks    Sulfa (Sulfonamide Antibiotics) Other (comments)    Sulfur Hives         Review of Systems   Review of Systems   Constitutional: Negative for chills and fever. HENT: Negative for congestion. Eyes: Negative for pain. Respiratory: Negative for shortness of breath. Cardiovascular: Positive for palpitations. Negative for chest pain. Gastrointestinal: Negative for abdominal pain. Genitourinary: Negative for flank pain. Musculoskeletal: Negative for back pain. Neurological: Positive for light-headedness. Negative for headaches. Psychiatric/Behavioral: Negative for agitation. Physical Exam     Vital Signs-Reviewed the patient's vital signs.   Patient Vitals for the past 12 hrs:   Temp Pulse Resp BP SpO2   03/14/22 1235 -- 60 17 (!) 156/85 96 %   03/14/22 1213 -- 64 18 138/76 98 %   03/14/22 1211 -- 66 18 (!) 153/82 99 %   03/14/22 1209 -- 62 17 (!) 140/84 100 %   03/14/22 1157 -- 60 16 (!) 153/76 99 %   03/14/22 1144 -- 60 17 130/63 100 %   03/14/22 1119 -- 64 18 117/73 --   03/14/22 1101 -- 74 18 (!) 87/52 97 %   03/14/22 1100 -- 62 17 103/64 98 %   03/14/22 1059 -- 63 17 116/65 99 %   03/14/22 1058 97.8 °F (36.6 °C) -- -- -- --   03/14/22 1048 -- 96 18 123/63 98 %       Physical Exam  Constitutional:       Appearance: Normal appearance. HENT:      Head: Normocephalic and atraumatic. Mouth/Throat:      Mouth: Mucous membranes are moist.   Eyes:      Pupils: Pupils are equal, round, and reactive to light. Cardiovascular:      Rate and Rhythm: Normal rate and regular rhythm. Comments: Felt lightheaded upon standing during orthostatic vital signs  Pulmonary:      Effort: Pulmonary effort is normal.      Breath sounds: Normal breath sounds. Abdominal:      Tenderness: There is no abdominal tenderness. Musculoskeletal:         General: No swelling. Skin:     General: Skin is warm and dry. Neurological:      Mental Status: She is alert and oriented to person, place, and time. Motor: No weakness. Psychiatric:         Mood and Affect: Mood normal.         Diagnostic Study Results     Labs -     Recent Results (from the past 12 hour(s))   CBC WITH AUTOMATED DIFF    Collection Time: 03/14/22 11:05 AM   Result Value Ref Range    WBC 4.6 3.6 - 11.0 K/uL    RBC 3.98 3.80 - 5.20 M/uL    HGB 12.9 11.5 - 16.0 g/dL    HCT 38.9 35.0 - 47.0 %    MCV 97.7 80.0 - 99.0 FL    MCH 32.4 26.0 - 34.0 PG    MCHC 33.2 30.0 - 36.5 g/dL    RDW 12.8 11.5 - 14.5 %    PLATELET 739 500 - 853 K/uL    MPV 10.7 8.9 - 12.9 FL    NRBC 0.0 0  WBC    ABSOLUTE NRBC 0.00 0.00 - 0.01 K/uL    NEUTROPHILS 49 32 - 75 %    LYMPHOCYTES 39 12 - 49 %    MONOCYTES 9 5 - 13 %    EOSINOPHILS 2 0 - 7 %    BASOPHILS 1 0 - 1 %    IMMATURE GRANULOCYTES 0 0.0 - 0.5 %    ABS.  NEUTROPHILS 2.3 1.8 - 8.0 K/UL    ABS. LYMPHOCYTES 1.8 0.8 - 3.5 K/UL    ABS. MONOCYTES 0.4 0.0 - 1.0 K/UL    ABS. EOSINOPHILS 0.1 0.0 - 0.4 K/UL    ABS. BASOPHILS 0.0 0.0 - 0.1 K/UL    ABS. IMM. GRANS. 0.0 0.00 - 0.04 K/UL    DF AUTOMATED     METABOLIC PANEL, COMPREHENSIVE    Collection Time: 03/14/22 11:05 AM   Result Value Ref Range    Sodium 140 136 - 145 mmol/L    Potassium 3.7 3.5 - 5.1 mmol/L    Chloride 103 97 - 108 mmol/L    CO2 32 21 - 32 mmol/L    Anion gap 5 5 - 15 mmol/L    Glucose 138 (H) 65 - 100 mg/dL    BUN 20 6 - 20 MG/DL    Creatinine 0.79 0.55 - 1.02 MG/DL    BUN/Creatinine ratio 25 (H) 12 - 20      GFR est AA >60 >60 ml/min/1.73m2    GFR est non-AA >60 >60 ml/min/1.73m2    Calcium 9.4 8.5 - 10.1 MG/DL    Bilirubin, total 0.5 0.2 - 1.0 MG/DL    ALT (SGPT) 30 12 - 78 U/L    AST (SGOT) 17 15 - 37 U/L    Alk. phosphatase 79 45 - 117 U/L    Protein, total 6.8 6.4 - 8.2 g/dL    Albumin 3.6 3.5 - 5.0 g/dL    Globulin 3.2 2.0 - 4.0 g/dL    A-G Ratio 1.1 1.1 - 2.2     TROPONIN-HIGH SENSITIVITY    Collection Time: 03/14/22 11:05 AM   Result Value Ref Range    Troponin-High Sensitivity 6 0 - 51 ng/L   SAMPLES BEING HELD    Collection Time: 03/14/22 11:05 AM   Result Value Ref Range    SAMPLES BEING HELD 1sst, 1blue     COMMENT        Add-on orders for these samples will be processed based on acceptable specimen integrity and analyte stability, which may vary by analyte. Radiologic Studies -   No orders to display     CT Results  (Last 48 hours)    None        CXR Results  (Last 48 hours)    None            EKG interpretation: (Preliminary)  Rhythm: Sinus, no ST elevation, normal intervals  This EKG was interpreted by ED Provider Mehnaz Eaton MD        Medical Decision Making   I am the first provider for this patient. I reviewed the vital signs, available nursing notes, past medical history, past surgical history, family history and social history.     Records Reviewed: Nursing Notes and Old Medical Records    Provider Notes (Medical Decision Making):   MDM: 65-year-old female with lightheadedness and near syncope especially upon standing. Noticing very low blood pressures at home. Here her blood pressure upon standing was 42O systolic. Patient is currently taking 2 blood pressure medicines. Will check electrolytes. Also plan for troponin now due to pain earlier this morning in xiphoid process. Initial assessment performed. The patients presenting problems have been discussed, and they are in agreement with the care plan formulated and outlined with them. I have encouraged them to ask questions as they arise throughout their visit. PROGRESS NOTE:  ED Course as of 03/14/22 1248   Mon Mar 14, 2022   1158 Test results discussed with patient. Given 1 L of fluid. Will recheck orthostatics and recommend patient hold her blood pressure medications until her follow-up appointment with her primary care doctor on Thursday. Currently on losartan and labetalol. [PV]   1227 Repeat orthostatics dramatically improved after fluids. Patient takes her losartan at night, takes her labetalol twice daily. Took her labetalol this morning. Will  her to miss medications today, tomorrow and Wednesday. Continue with follow-up plan on Thursday. Will also recommend patient monitor her blood pressures at the same time daily, she is currently taking it approximately 10 times per day. [PV]      ED Course User Index  [PV] Saritha Alberts MD        Discharge note:  Pt re-evaluated and noted to be feeling better, ready for discharge. Updated pt on all final results. Will follow up as instructed. All questions have been answered, pt voiced understanding and agreement with plan. Specific return precautions provided as well as instructions to return to the ED should sx worsen at any time. Vital signs stable for discharge. Diagnosis     Clinical Impression:   1.  Orthostatic hypotension Disposition:  Discharged    Discharge Medication List as of 3/14/2022 12:36 PM            Please note, this dictation was completed with [employer], the computer voice recognition software. Quite often unanticipated grammatical, syntax, homophones, and other interpretive errors are inadvertently transcribed by the computer software. Please disregard these errors. Please excuse any errors that have escaped final proof reading.

## 2022-03-17 ENCOUNTER — VIRTUAL VISIT (OUTPATIENT)
Dept: FAMILY MEDICINE CLINIC | Age: 72
End: 2022-03-17
Payer: MEDICARE

## 2022-03-17 DIAGNOSIS — I10 ESSENTIAL HYPERTENSION: Primary | ICD-10-CM

## 2022-03-17 LAB
ATRIAL RATE: 60 BPM
CALCULATED P AXIS, ECG09: 58 DEGREES
CALCULATED R AXIS, ECG10: -7 DEGREES
CALCULATED T AXIS, ECG11: 67 DEGREES
DIAGNOSIS, 93000: NORMAL
P-R INTERVAL, ECG05: 220 MS
Q-T INTERVAL, ECG07: 436 MS
QRS DURATION, ECG06: 84 MS
QTC CALCULATION (BEZET), ECG08: 436 MS
VENTRICULAR RATE, ECG03: 60 BPM

## 2022-03-17 PROCEDURE — G2025 DIS SITE TELE SVCS RHC/FQHC: HCPCS | Performed by: INTERNAL MEDICINE

## 2022-03-17 NOTE — PROGRESS NOTES
Verna Wright is a 66 y.o. female presenting for/with:    Chief Complaint   Patient presents with    Hypertension     States has had no meds since 3/14 after ER visit. Denies any additional vertigo/dizziness       There were no vitals taken for this visit. Pain Scale: /10  Pain Location:     1. \"Have you been to the ER, urgent care clinic since your last visit? Hospitalized since your last visit? \" Yes Where: [facility] Reason for visit: dizziness    2. \"Have you seen or consulted any other health care providers outside of the [address] [place] [address] since your last visit? \" No     3. For patients aged 39-70: Has the patient had a colonoscopy / FIT/ Cologuard? Yes - no Care Gap present      If the patient is female:    4. For patients aged 41-77: Has the patient had a mammogram within the past 2 years? Yes - no Care Gap present      5. For patients aged 21-65: Has the patient had a pap smear?  Yes - no Care Gap present      Symptom review:  NO  Fever   NO  Shaking chills  NO  Cough  NO  Body aches  NO  Coughing up blood  NO  Chest congestion  NO  Chest pain  NO  Shortness of breath  NO  Profound Loss of smell/taste  NO  Nausea/Vomiting   NO  Loose stool/Diarrhea  NO  any skin issues    Patient Risk Factors Reviewed as follows:  NO  have you been in Close contact with confirmed COVID19 patient   NO  History of recent travel to affected geographical areas within the past 14 days  NO  COPD  NO  Active Cancer/Leukemia/Lymphoma/Chemotherapy  NO  Oral steroid use  NO  Pregnant  NO  Diabetes Mellitus  YES  Heart disease  NO  Asthma  NO Health care worker at home  [address] care worker  NO Is there a Pregnant Woman in the home  NO Dialysis pt in the home   NO a large number of people living in the home    Learning Assessment 7/19/2021   PRIMARY LEARNER Patient   PRIMARY LANGUAGE ENGLISH   LEARNER PREFERENCE PRIMARY DEMONSTRATION   ANSWERED BY patient   RELATIONSHIP SELF     Fall Risk Assessment, last 12 mths 12/9/2021   Able to walk? Yes   Fall in past 12 months? 0   Do you feel unsteady? 0   Are you worried about falling 0   Is the gait abnormal? -   Number of falls in past 12 months -   Fall with injury? -       3 most recent PHQ Screens 1/20/2022   PHQ Not Done -   Little interest or pleasure in doing things Not at all   Feeling down, depressed, irritable, or hopeless Not at all   Total Score PHQ 2 0     Abuse Screening Questionnaire 12/9/2021   Do you ever feel afraid of your partner? N   Are you in a relationship with someone who physically or mentally threatens you? N   Is it safe for you to go home?  Y       ADL Assessment 12/9/2021   Feeding yourself No Help Needed   Getting from bed to chair No Help Needed   Getting dressed No Help Needed   Bathing or showering No Help Needed   Walk across the room (includes cane/walker) No Help Needed   Using the telphone No Help Needed   Taking your medications No Help Needed   Preparing meals No Help Needed   Managing money (expenses/bills) No Help Needed   Moderately strenuous housework (laundry) No Help Needed   Shopping for personal items (toiletries/medicines) No Help Needed   Shopping for groceries No Help Needed   Driving No Help Needed   Climbing a flight of stairs No Help Needed   Getting to places beyond walking distances No Help Needed      Advance Care Planning 8/5/2021   Patient's Healthcare Decision Maker is: -   Confirm Advance Directive Yes, on file

## 2022-03-17 NOTE — PROGRESS NOTES
Cinda Jones (: 1950) is a 66 y.o. female, established patient, here for evaluation of the following chief complaint(s):   Hypertension (States has had no meds since 3/14 after ER visit. Denies any additional vertigo/dizziness)       ASSESSMENT/PLAN:  Below is the assessment and plan developed based on review of pertinent history, labs, studies, and medications. 1. Essential hypertension    Resume labetalol 200 mg twice a day. Check blood pressure 2 hours after the evening dose of labetalol no more than 3 times a week. In the event that the systolic blood pressures greater than 180, she may take 1 dose of losartan HCTZ. We will also refer to cardiology for further evaluation of her labile hypertension. May need tilt table testing. Follow-up in 2 weeks    No follow-ups on file. SUBJECTIVE/OBJECTIVE:  HPI   68-year-old woman who has a history of labile hypertension who has been seen twice in the emergency room the last couple of weeks via video stream with blood pressures in the 154 -185 range. Currently not taking any medications since leaving the ER. Denies chest pain, pressure or tightness. Review of Systems denies chest, back, neck, arm discomfort. Denies nausea.     No data recorded     Physical Exam    [INSTRUCTIONS:  \"[x]\" Indicates a positive item  \"[]\" Indicates a negative item  -- DELETE ALL ITEMS NOT EXAMINED]    Constitutional: [x] Appears well-developed and well-nourished [x] No apparent distress      [] Abnormal -     Mental status: [x] Alert and awake  [x] Oriented to person/place/time [x] Able to follow commands    [] Abnormal -     Eyes:   EOM    [x]  Normal    [] Abnormal -   Sclera  [x]  Normal    [] Abnormal -          Discharge [x]  None visible   [] Abnormal -     HENT: [x] Normocephalic, atraumatic  [] Abnormal -   [x] Mouth/Throat: Mucous membranes are moist    External Ears [x] Normal  [] Abnormal -    Neck: [x] No visualized mass [] Abnormal -     Pulmonary/Chest: [x] Respiratory effort normal   [x] No visualized signs of difficulty breathing or respiratory distress        [] Abnormal -      Musculoskeletal:   [x] Normal gait with no signs of ataxia         [x] Normal range of motion of neck        [] Abnormal -     Neurological:        [x] No Facial Asymmetry (Cranial nerve 7 motor function) (limited exam due to video visit)          [x] No gaze palsy        [] Abnormal -          Skin:        [x] No significant exanthematous lesions or discoloration noted on facial skin         [] Abnormal -            Psychiatric:       [x] Normal Affect [] Abnormal -        [x] No Hallucinations    Other pertinent observable physical exam findings:-        On this date 03/17/2022 I have spent 30 minutes reviewing previous notes, test results and face to face (virtual) with the patient discussing the diagnosis and importance of compliance with the treatment plan as well as documenting on the day of the visit. Renata Jones, was evaluated through a synchronous (real-time) audio-video encounter. The patient (or guardian if applicable) is aware that this is a billable service, which includes applicable co-pays. Verbal consent to proceed has been obtained. The visit was conducted pursuant to the emergency declaration under the [address] [place] [address], [address] [place] [address] authority and the [employer] and [employer] General Act. Patient identification was verified, and a caregiver was present when appropriate. The patient was located at home in a state where the provider was licensed to provide care. An electronic signature was used to authenticate this note.   -- Gautam Dial MD

## 2022-04-01 ENCOUNTER — VIRTUAL VISIT (OUTPATIENT)
Dept: FAMILY MEDICINE CLINIC | Age: 72
End: 2022-04-01
Payer: MEDICARE

## 2022-04-01 ENCOUNTER — TELEPHONE (OUTPATIENT)
Dept: FAMILY MEDICINE CLINIC | Age: 72
End: 2022-04-01

## 2022-04-01 DIAGNOSIS — I10 ESSENTIAL HYPERTENSION: Primary | ICD-10-CM

## 2022-04-01 PROCEDURE — G2025 DIS SITE TELE SVCS RHC/FQHC: HCPCS | Performed by: INTERNAL MEDICINE

## 2022-04-01 RX ORDER — LABETALOL 200 MG/1
200 TABLET, FILM COATED ORAL 2 TIMES DAILY
Qty: 180 TABLET | Refills: 4
Start: 2022-04-01 | End: 2022-10-11

## 2022-04-01 RX ORDER — LABETALOL 200 MG/1
200 TABLET, FILM COATED ORAL 2 TIMES DAILY
COMMUNITY
End: 2022-04-14 | Stop reason: ALTCHOICE

## 2022-04-01 NOTE — PROGRESS NOTES
Yon Amezcua is a 66 y.o. female presenting for/with:    Chief Complaint   Patient presents with    Hypertension     2 week F/U - Reports overall BPs have been good, has been taking it every few days in the afternoons. States feels that she is having anxiety during elevated BP    Ear Pain     C/O ear pain starting at midnight but has gradually gotten better through out the day - suggested ibuprofen for pain if it occurs    Referral Follow Up     Reports has appt with cardio but wants to know since her BP is controlled should she keep it    Advice Only     Wants to know if she can continue to have 2 glasses of wine nightly. There were no vitals taken for this visit. Pain Scale: /10  Pain Location:     1. \"Have you been to the ER, urgent care clinic since your last visit? Hospitalized since your last visit? \" No    2. \"Have you seen or consulted any other health care providers outside of the [address] [place] [address] since your last visit? \" No     3. For patients aged 39-70: Has the patient had a colonoscopy / FIT/ Cologuard? Yes - no Care Gap present      If the patient is female:    4. For patients aged 41-77: Has the patient had a mammogram within the past 2 years? Yes - no Care Gap present      5. For patients aged 21-65: Has the patient had a pap smear?  Yes - no Care Gap present      Symptom review:  NO  Fever   NO  Shaking chills  NO  Cough  NO  Body aches  NO  Coughing up blood  NO  Chest congestion  NO  Chest pain  NO  Shortness of breath  NO  Profound Loss of smell/taste  NO  Nausea/Vomiting   NO  Loose stool/Diarrhea  NO  any skin issues    Patient Risk Factors Reviewed as follows:  NO  have you been in Close contact with confirmed COVID19 patient   NO  History of recent travel to affected geographical areas within the past 14 days  NO  COPD  NO  Active Cancer/Leukemia/Lymphoma/Chemotherapy  NO  Oral steroid use  NO  Pregnant  NO  Diabetes Mellitus  YES  Heart disease  NO  Asthma  NO Health care worker at home  [address] care worker  NO Is there a Pregnant Woman in the home  NO Dialysis pt in the home   NO a large number of people living in the home    Learning Assessment 7/19/2021   PRIMARY LEARNER Patient   PRIMARY LANGUAGE ENGLISH   LEARNER PREFERENCE PRIMARY DEMONSTRATION   ANSWERED BY patient   RELATIONSHIP SELF     Fall Risk Assessment, last 12 mths 4/1/2022   Able to walk? Yes   Fall in past 12 months? 0   Do you feel unsteady? 0   Are you worried about falling 0   Is the gait abnormal? -   Number of falls in past 12 months -   Fall with injury? -       3 most recent PHQ Screens 4/1/2022   PHQ Not Done -   Little interest or pleasure in doing things Not at all   Feeling down, depressed, irritable, or hopeless Not at all   Total Score PHQ 2 0     Abuse Screening Questionnaire 4/1/2022   Do you ever feel afraid of your partner? N   Are you in a relationship with someone who physically or mentally threatens you? N   Is it safe for you to go home?  Y       ADL Assessment 4/1/2022   Feeding yourself No Help Needed   Getting from bed to chair No Help Needed   Getting dressed No Help Needed   Bathing or showering No Help Needed   Walk across the room (includes cane/walker) No Help Needed   Using the telphone No Help Needed   Taking your medications No Help Needed   Preparing meals No Help Needed   Managing money (expenses/bills) No Help Needed   Moderately strenuous housework (laundry) No Help Needed   Shopping for personal items (toiletries/medicines) No Help Needed   Shopping for groceries No Help Needed   Driving No Help Needed   Climbing a flight of stairs No Help Needed   Getting to places beyond walking distances No Help Needed      Advance Care Planning 8/5/2021   Patient's Healthcare Decision Maker is: -   Confirm Advance Directive Yes, on file

## 2022-04-01 NOTE — PROGRESS NOTES
Vito Jones (: 1950) is a 66 y.o. female, established patient, here for evaluation of the following chief complaint(s):   Hypertension (2 week F/U - Reports overall BPs have been good, has been taking it every few days in the afternoons. States feels that she is having anxiety during elevated BP), Ear Pain (C/O ear pain starting at midnight but has gradually gotten better through out the day - suggested ibuprofen for pain if it occurs), Referral Follow Up (Reports has appt with cardio but wants to know since her BP is controlled should she keep it), and Advice Only (Wants to know if she can continue to have 2 glasses of wine nightly. )       ASSESSMENT/PLAN:  Below is the assessment and plan developed based on review of pertinent history, labs, studies, and medications. 1. Essential hypertension  -     labetaloL (NORMODYNE) 200 mg tablet; Take 1 Tablet by mouth two (2) times a day. Indications: high blood pressure, No Print, Disp-180 Tablet, R-4      No follow-ups on file. SUBJECTIVE/OBJECTIVE:  HPI she reports that her blood pressure has been as good as 119/70 on her current regimen. No dizziness or lightheadedness. Alcohol discussion-would like to have my blessing to drink 10 ounces of wine daily. I have no problem with that. Explained that alcohol is a neurotoxin and also a cardiac irritant and in the event that she has problems with mentation, cognition or palpitations that she might want to cut back on her alcohol intake. Her LFTs have been normal.    Review of Systems denies fever, chills, cough, nausea, vomiting or diarrhea.     No data recorded     Physical Exam    [INSTRUCTIONS:  \"[x]\" Indicates a positive item  \"[]\" Indicates a negative item  -- DELETE ALL ITEMS NOT EXAMINED]    Constitutional: [x] Appears well-developed and well-nourished [x] No apparent distress      [] Abnormal -     Mental status: [x] Alert and awake  [x] Oriented to person/place/time [x] Able to follow commands    [] Abnormal -     Eyes:   EOM    [x]  Normal    [] Abnormal -   Sclera  [x]  Normal    [] Abnormal -          Discharge [x]  None visible   [] Abnormal -     HENT: [x] Normocephalic, atraumatic  [] Abnormal -   [x] Mouth/Throat: Mucous membranes are moist    External Ears [x] Normal  [] Abnormal -    Neck: [x] No visualized mass [] Abnormal -     Pulmonary/Chest: [x] Respiratory effort normal   [x] No visualized signs of difficulty breathing or respiratory distress        [] Abnormal -      Musculoskeletal:   [x] Normal gait with no signs of ataxia         [x] Normal range of motion of neck        [] Abnormal -     Neurological:        [x] No Facial Asymmetry (Cranial nerve 7 motor function) (limited exam due to video visit)          [x] No gaze palsy        [] Abnormal -          Skin:        [x] No significant exanthematous lesions or discoloration noted on facial skin         [] Abnormal -            Psychiatric:       [x] Normal Affect [] Abnormal -        [x] No Hallucinations    Other pertinent observable physical exam findings:-        On this date 04/01/2022 I have spent 25 minutes reviewing previous notes, test results and face to face (virtual) with the patient discussing the diagnosis and importance of compliance with the treatment plan as well as documenting on the day of the visit. Antolin Silva May, was evaluated through a synchronous (real-time) audio-video encounter. The patient (or guardian if applicable) is aware that this is a billable service, which includes applicable co-pays. Verbal consent to proceed has been obtained. The visit was conducted pursuant to the emergency declaration under the [address] [place] [address] authority and the [employer] and [employer] General Act. Patient identification was verified, and a caregiver was present when appropriate.  The patient was located at home in a state where the provider was licensed to provide care.       An electronic signature was used to authenticate this note.   -- Allan Patel MD

## 2022-04-01 NOTE — TELEPHONE ENCOUNTER
[phone] contact # per Suraj Barker:  Left ear pain, pls have ;Nurse Louisa Schneider give me a call back to determine what to do?     Thanks,

## 2022-04-14 ENCOUNTER — OFFICE VISIT (OUTPATIENT)
Dept: CARDIOLOGY CLINIC | Age: 72
End: 2022-04-14
Payer: MEDICARE

## 2022-04-14 VITALS
RESPIRATION RATE: 16 BRPM | HEART RATE: 70 BPM | OXYGEN SATURATION: 96 % | WEIGHT: 125.4 LBS | DIASTOLIC BLOOD PRESSURE: 70 MMHG | BODY MASS INDEX: 20.15 KG/M2 | HEIGHT: 66 IN | SYSTOLIC BLOOD PRESSURE: 120 MMHG

## 2022-04-14 DIAGNOSIS — R07.2 PRECORDIAL CHEST PAIN: Primary | ICD-10-CM

## 2022-04-14 DIAGNOSIS — Z76.89 ENCOUNTER TO ESTABLISH CARE: ICD-10-CM

## 2022-04-14 DIAGNOSIS — R09.89 LABILE HYPERTENSION: ICD-10-CM

## 2022-04-14 DIAGNOSIS — I95.1 ORTHOSTATIC HYPOTENSION: ICD-10-CM

## 2022-04-14 DIAGNOSIS — I10 ESSENTIAL HYPERTENSION: ICD-10-CM

## 2022-04-14 PROCEDURE — G8399 PT W/DXA RESULTS DOCUMENT: HCPCS | Performed by: INTERNAL MEDICINE

## 2022-04-14 PROCEDURE — 3017F COLORECTAL CA SCREEN DOC REV: CPT | Performed by: INTERNAL MEDICINE

## 2022-04-14 PROCEDURE — G0463 HOSPITAL OUTPT CLINIC VISIT: HCPCS | Performed by: INTERNAL MEDICINE

## 2022-04-14 PROCEDURE — G8510 SCR DEP NEG, NO PLAN REQD: HCPCS | Performed by: INTERNAL MEDICINE

## 2022-04-14 PROCEDURE — 1090F PRES/ABSN URINE INCON ASSESS: CPT | Performed by: INTERNAL MEDICINE

## 2022-04-14 PROCEDURE — G8427 DOCREV CUR MEDS BY ELIG CLIN: HCPCS | Performed by: INTERNAL MEDICINE

## 2022-04-14 PROCEDURE — G8536 NO DOC ELDER MAL SCRN: HCPCS | Performed by: INTERNAL MEDICINE

## 2022-04-14 PROCEDURE — 99204 OFFICE O/P NEW MOD 45 MIN: CPT | Performed by: INTERNAL MEDICINE

## 2022-04-14 PROCEDURE — 93010 ELECTROCARDIOGRAM REPORT: CPT | Performed by: INTERNAL MEDICINE

## 2022-04-14 PROCEDURE — 93005 ELECTROCARDIOGRAM TRACING: CPT | Performed by: INTERNAL MEDICINE

## 2022-04-14 PROCEDURE — G9899 SCRN MAM PERF RSLTS DOC: HCPCS | Performed by: INTERNAL MEDICINE

## 2022-04-14 PROCEDURE — G8420 CALC BMI NORM PARAMETERS: HCPCS | Performed by: INTERNAL MEDICINE

## 2022-04-14 PROCEDURE — G8752 SYS BP LESS 140: HCPCS | Performed by: INTERNAL MEDICINE

## 2022-04-14 PROCEDURE — 1101F PT FALLS ASSESS-DOCD LE1/YR: CPT | Performed by: INTERNAL MEDICINE

## 2022-04-14 PROCEDURE — G8754 DIAS BP LESS 90: HCPCS | Performed by: INTERNAL MEDICINE

## 2022-04-14 NOTE — LETTER
4/14/2022    Patient: Gabriel Long   YOB: 1950   Date of Visit: 4/14/2022     Cheryle Hebert MD  [address] [place] [address]    Dear Cheryle Hebert MD,      Thank you for referring Ms. Bel Jones to [facility] for evaluation. My notes for this consultation are attached. If you have questions, please do not hesitate to call me. I look forward to following your patient along with you.       Sincerely,    Kenyon Shea MD

## 2022-04-14 NOTE — PROGRESS NOTES
[address]  [phone]     Subjective: Genie Koo is a 66 y.o. female is here for a new patient visit. She was seen in the emergency department at [facility] 3/14/2022 with the following history:    66 y.o. female, pmhx listed below, who presents to the ED c/o low blood pressure. Patient was in the emergency department last week and has been noting one moment her blood pressure would be 70/50 and the next moment it would be 160/80. Reports that she has been increasingly noting blood pressures that are in the 97L systolic. When they occur, she has noticed that she is feeling lightheaded. No recent weight loss or medication changes. Denies diarrhea, vomiting, poor p.o. intake. Reports she feels well while sitting in the bed in the emergency department. Patient also reports she has been having strange sensations in her body. Felt substernal discomfort earlier this morning around her xiphoid process, felt bilateral arm tingling in the car on the way to the hospital.    She was given IV fluids and orthostatics markedly improved. At that time, she has been on labetalol and losartan/HCTZ. Now she is only on labetalol. She also had some subxiphoid chest discomfort that morning and a few other times and troponin, EKG were okay. Since that time, her blood pressures have been better controlled and she was not even sure if she needs to have this appointment, but she wanted to follow through. Orthostatics today negative, normal.  She is active on a day-to-day basis, but no formal exercise currently. Today, the patient denies chest pain/ shortness of breath, orthopnea, PND, LE edema, palpitations, syncope, or presyncope.        Patient Active Problem List    Diagnosis Date Noted    UTI (lower urinary tract infection)     Essential hypertension 06/19/2015    Actinic keratoses 06/19/2015    Nonspecific abnormal finding in stool contents 01/26/2015      Nabil Reese MD  Past Medical History:   Diagnosis Date    Basal cell carcinoma     excised from nose and clavicl    Cerumen impaction     Endocrine disorder     Hypertension     Menopause     age 47    Nystagmus     OA (osteoarthritis) of finger     Peripheral sensory neuropathy age 46    mild    Thyroid nodule     UTI (lower urinary tract infection)     recurrent      Past Surgical History:   Procedure Laterality Date    HX CATARACT REMOVAL Bilateral     lens implants    HX COLONOSCOPY  2008    normal    DC URETHRLYS, TRANSVAG W/ SCOPE      dilation     Allergies   Allergen Reactions    Mollusks Nausea and Vomiting    Shellfish Derived Nausea and Vomiting     Mullusks    Sulfa (Sulfonamide Antibiotics) Other (comments)    Sulfur Hives      Family History   Problem Relation Age of Onset    Breast Cancer Mother         dx 80    Thyroid Cancer Sister     Breast Cancer Sister         dx age 42's   [facility] Diabetes Sister         Peripheral neuropathy    Cancer Sister         Breast, CLL      Social History     Socioeconomic History    Marital status:      Spouse name: Not on file    Number of children: 3    Years of education: Not on file    Highest education level: Not on file   Occupational History    Not on file   Tobacco Use    Smoking status: Never Smoker    Smokeless tobacco: Never Used   Vaping Use    Vaping Use: Never used   Substance and Sexual Activity    Alcohol use:  Yes     Alcohol/week: 2.0 standard drinks     Types: 2 Glasses of wine per week    Drug use: No    Sexual activity: Yes     Partners: Male   Other Topics Concern     Service No    Blood Transfusions No    Caffeine Concern No    Occupational Exposure No    Hobby Hazards No    Sleep Concern No    Stress Concern No    Weight Concern No    Special Diet No    Back Care No    Exercise Yes     Comment: active    Bike Helmet No    Seat Belt Yes    Self-Exams Yes Social History Narrative    eriph     Social Determinants of Health     Financial Resource Strain:     Difficulty of Paying Living Expenses: Not on file   Food Insecurity:     Worried About Running Out of Food in the Last Year: Not on file    Brigitte of Food in the Last Year: Not on file   Transportation Needs:     Lack of Transportation (Medical): Not on file    Lack of Transportation (Non-Medical): Not on file   Physical Activity:     Days of Exercise per Week: Not on file    Minutes of Exercise per Session: Not on file   Stress:     Feeling of Stress : Not on file   Social Connections:     Frequency of Communication with Friends and Family: Not on file    Frequency of Social Gatherings with Friends and Family: Not on file    Attends [employer] Services: Not on file    Active Member of [address] [place] [address] Element ID or Organizations: Not on file    Attends Club or Organization Meetings: Not on file    Marital Status: Not on file   Intimate Partner Violence:     Fear of Current or Ex-Partner: Not on file    Emotionally Abused: Not on file    Physically Abused: Not on file    Sexually Abused: Not on file   Housing Stability:     Unable to Pay for Housing in the Last Year: Not on file    Number of [place] in the Last Year: Not on file    Unstable Housing in the Last Year: Not on file      Current Outpatient Medications   Medication Sig    labetaloL (NORMODYNE) 200 mg tablet Take 1 Tablet by mouth two (2) times a day. Indications: high blood pressure     No current facility-administered medications for this visit. Review of Symptoms:  11 systems reviewed, negative other than as stated in the HPI    Physical ExamPhysical Exam:    Vitals:    04/14/22 0907 04/14/22 0921   BP: 120/70 110/60   Pulse: 70    Resp: 16    SpO2: 96%    Weight: 125 lb 6.4 oz (56.9 kg)    Height: 5' 6\" (1.676 m)      Body mass index is 20.24 kg/m². General PE  Gen:  NAD  Mental Status - Alert.  General Appearance - Not in acute distress. HEENT:  PERRL, no carotid bruits or JVD  Chest and Lung Exam   Inspection: Accessory muscles - No use of accessory muscles in breathing. Auscultation:   Breath sounds: - Normal.   Cardiovascular   Inspection: Jugular vein - Bilateral - Inspection Normal.   Palpation/Percussion:   Apical Impulse: - Normal.   Auscultation: Rhythm - Regular. Heart Sounds - S1 WNL and S2 WNL. No S3 or S4. Murmurs & Other Heart Sounds: Auscultation of the heart reveals - No Murmurs. Peripheral Vascular   Upper Extremity: Inspection - Bilateral - No Cyanotic nailbeds or Digital clubbing. Lower Extremity:   Palpation: Edema - Bilateral - No edema. Abdomen:   Soft, non-tender, bowel sounds are active.   Neuro: A&O times 3, CN and motor grossly WNL    Labs:   Lab Results   Component Value Date/Time    Cholesterol, total 204 (H) 07/19/2021 11:01 AM    Cholesterol, total 169 07/19/2019 11:11 AM    Cholesterol, total 165 07/17/2018 10:00 AM    Cholesterol, total 193 07/28/2017 01:34 PM    Cholesterol, total 180 03/28/2017 02:39 PM    HDL Cholesterol 66 07/19/2021 11:01 AM    HDL Cholesterol 58 07/19/2019 11:11 AM    HDL Cholesterol 57 07/17/2018 10:00 AM    HDL Cholesterol 58 07/28/2017 01:34 PM    HDL Cholesterol 51 03/28/2017 02:39 PM    LDL, calculated 107.6 (H) 07/19/2021 11:01 AM    LDL, calculated 79 07/19/2019 11:11 AM    LDL, calculated 77 07/17/2018 10:00 AM    LDL, calculated 81 07/28/2017 01:34 PM    LDL, calculated 78 03/28/2017 02:39 PM    Triglyceride 152 (H) 07/19/2021 11:01 AM    Triglyceride 158 (H) 07/19/2019 11:11 AM    Triglyceride 156 (H) 07/17/2018 10:00 AM    Triglyceride 269 (H) 07/28/2017 01:34 PM    Triglyceride 257 (H) 03/28/2017 02:39 PM    CHOL/HDL Ratio 3.1 07/19/2021 11:01 AM     No results found for: CPK, CPKX, CPX  Lab Results   Component Value Date/Time    Sodium 140 03/14/2022 11:05 AM    Potassium 3.7 03/14/2022 11:05 AM    Chloride 103 03/14/2022 11:05 AM    CO2 32 03/14/2022 11:05 AM Anion gap 5 03/14/2022 11:05 AM    Glucose 138 (H) 03/14/2022 11:05 AM    BUN 20 03/14/2022 11:05 AM    Creatinine 0.79 03/14/2022 11:05 AM    BUN/Creatinine ratio 25 (H) 03/14/2022 11:05 AM    GFR est AA >60 03/14/2022 11:05 AM    GFR est non-AA >60 03/14/2022 11:05 AM    Calcium 9.4 03/14/2022 11:05 AM    Bilirubin, total 0.5 03/14/2022 11:05 AM    Alk. phosphatase 79 03/14/2022 11:05 AM    Protein, total 6.8 03/14/2022 11:05 AM    Albumin 3.6 03/14/2022 11:05 AM    Globulin 3.2 03/14/2022 11:05 AM    A-G Ratio 1.1 03/14/2022 11:05 AM    ALT (SGPT) 30 03/14/2022 11:05 AM       EKG:  NSR     Assessment:          ICD-10-CM ICD-9-CM    1. Precordial chest pain  R07.2 786.51    2. Essential hypertension  I10 401.9 AMB POC EKG ROUTINE W/ 12 LEADS, INTER & REP   3. Encounter to establish care  Z76.89 V65.8 AMB POC EKG ROUTINE W/ 12 LEADS, INTER & REP   4. Orthostatic hypotension  I95.1 458.0    5. Labile hypertension  R09.89 401.9        Orders Placed This Encounter    AMB POC EKG ROUTINE W/ 12 LEADS, INTER & REP     Order Specific Question:   Reason for Exam:     Answer:   routine        Plan:     66year-old with multiple cardiac risk factors intermittent chest discomfort (recently improved) and a period of labile hypertension along with orthostatic hypotension in early March that have resolved with discontinuation of losartan/HCTZ, and hydration in ER. Labile hypertension/orthostatic hypotension:  I think that orthostatic hypotension was due to a combination of the 3 medications noted above, now resolved and doing well on labetalol alone. Possible contribution from volume depletion which has now improved as well. Check echo for hypertensive heart disease. Substernal chest discomfort:  Check stress echo. If that is negative, Coronary calcium scoring would be reassuring if totally normal and threshold for further testing/treatment would be decreased if high- the patient wishes to think about it at this time. Provided pamphlet for coronary calcium scoring and explained how this might change her management with statins, etc. she points out that she is actually in the [place] heart study and they have done extensive evaluations on her, and she can provide us that information in the future. If testing is normal and no more concerning symptoms, she will likely follow-up in a year in [place]. Sooner as needed.       Amrita Paez MD

## 2022-04-19 ENCOUNTER — TRANSCRIBE ORDER (OUTPATIENT)
Dept: SCHEDULING | Age: 72
End: 2022-04-19

## 2022-04-19 DIAGNOSIS — I95.1 ORTHOSTATIC HYPOTENSION: ICD-10-CM

## 2022-04-19 DIAGNOSIS — I10 ESSENTIAL HYPERTENSION, BENIGN: ICD-10-CM

## 2022-04-19 DIAGNOSIS — R07.2 PRECORDIAL CHEST PAIN: Primary | ICD-10-CM

## 2022-04-19 DIAGNOSIS — Z76.89 ENCOUNTER TO ESTABLISH CARE: ICD-10-CM

## 2022-04-19 DIAGNOSIS — R09.89 LABILE HYPERTENSION: ICD-10-CM

## 2022-04-21 ENCOUNTER — TRANSCRIBE ORDER (OUTPATIENT)
Dept: SCHEDULING | Age: 72
End: 2022-04-21

## 2022-04-21 ENCOUNTER — TELEPHONE (OUTPATIENT)
Dept: CARDIOLOGY CLINIC | Age: 72
End: 2022-04-21

## 2022-04-21 DIAGNOSIS — Z00.00 PREVENTATIVE HEALTH CARE: Primary | ICD-10-CM

## 2022-04-21 NOTE — TELEPHONE ENCOUNTER
Please advise the patient that I received some data from the [place] heart study including labs, blood pressure, and EKGs. Thanks for that. I still believe that coronary calcium scoring would provide complementary information in either confirming the presence of or the absence of cholesterol plaque in the arteries of the heart which may change her management in the form of cholesterol treatment. If she is interested, please provide the phone number on our coronary calcium scoring pamphlets and she can schedule. Once she gets it done, call us for my input on results.

## 2022-04-29 ENCOUNTER — HOSPITAL ENCOUNTER (OUTPATIENT)
Dept: ULTRASOUND IMAGING | Age: 72
Discharge: HOME | End: 2022-04-29
Attending: INTERNAL MEDICINE
Payer: MEDICARE

## 2022-04-29 DIAGNOSIS — I95.1 ORTHOSTATIC HYPOTENSION: ICD-10-CM

## 2022-04-29 DIAGNOSIS — Z76.89 ENCOUNTER TO ESTABLISH CARE: ICD-10-CM

## 2022-04-29 DIAGNOSIS — R09.89 LABILE HYPERTENSION: ICD-10-CM

## 2022-04-29 DIAGNOSIS — I10 ESSENTIAL HYPERTENSION: ICD-10-CM

## 2022-04-29 DIAGNOSIS — R07.2 PRECORDIAL CHEST PAIN: ICD-10-CM

## 2022-04-29 PROCEDURE — 93306 TTE W/DOPPLER COMPLETE: CPT

## 2022-05-01 LAB
ECHO AO ROOT DIAM: 2.4 CM
ECHO AR MAX VEL PISA: 3.6 M/S
ECHO AV PEAK GRADIENT: 10 MMHG
ECHO AV PEAK VELOCITY: 1.6 M/S
ECHO AV REGURGITANT PHT: 486.7 MILLISECOND
ECHO EST RA PRESSURE: 10 MMHG
ECHO LA DIAMETER: 4.2 CM
ECHO LA TO AORTIC ROOT RATIO: 1.75
ECHO LA VOL 2C: 89 ML (ref 22–52)
ECHO LA VOL 4C: 50 ML (ref 22–52)
ECHO LA VOLUME AREA LENGTH: 75 ML
ECHO LV E' SEPTAL VELOCITY: 8 CM/S
ECHO LV FRACTIONAL SHORTENING: 36 % (ref 28–44)
ECHO LV INTERNAL DIMENSION DIASTOLIC: 3.6 CM (ref 3.9–5.3)
ECHO LV INTERNAL DIMENSION SYSTOLIC: 2.3 CM
ECHO LV IVSD: 0.8 CM (ref 0.6–0.9)
ECHO LV MASS 2D: 85.6 G (ref 67–162)
ECHO LV POSTERIOR WALL DIASTOLIC: 0.9 CM (ref 0.6–0.9)
ECHO LV RELATIVE WALL THICKNESS RATIO: 0.5
ECHO LVOT AREA: 2.8 CM2
ECHO LVOT DIAM: 1.9 CM
ECHO MV A VELOCITY: 0.64 M/S
ECHO MV E DECELERATION TIME (DT): 193.7 MS
ECHO MV E VELOCITY: 0.7 M/S
ECHO MV E/A RATIO: 1.09
ECHO MV E/E' SEPTAL: 8.75
ECHO RIGHT VENTRICULAR SYSTOLIC PRESSURE (RVSP): 26 MMHG
ECHO TV REGURGITANT MAX VELOCITY: 2.01 M/S
ECHO TV REGURGITANT PEAK GRADIENT: 16 MMHG

## 2022-05-01 PROCEDURE — 93306 TTE W/DOPPLER COMPLETE: CPT | Performed by: INTERNAL MEDICINE

## 2022-05-02 NOTE — PROGRESS NOTES
Normal pumping heart strength, mild leakage across mitral and aortic valve, nothing to worry about.   Await stress test

## 2022-05-23 ENCOUNTER — HOSPITAL ENCOUNTER (OUTPATIENT)
Dept: NON INVASIVE DIAGNOSTICS | Age: 72
Discharge: HOME | End: 2022-05-23
Attending: INTERNAL MEDICINE
Payer: MEDICARE

## 2022-05-23 DIAGNOSIS — R07.2 PRECORDIAL CHEST PAIN: ICD-10-CM

## 2022-05-23 DIAGNOSIS — I95.1 ORTHOSTATIC HYPOTENSION: ICD-10-CM

## 2022-05-23 DIAGNOSIS — I10 ESSENTIAL HYPERTENSION: ICD-10-CM

## 2022-05-23 DIAGNOSIS — R09.89 LABILE HYPERTENSION: ICD-10-CM

## 2022-05-23 DIAGNOSIS — Z76.89 ENCOUNTER TO ESTABLISH CARE: ICD-10-CM

## 2022-05-23 LAB
STRESS BASELINE DIAS BP: 101 MMHG
STRESS BASELINE HR: 77 BPM
STRESS BASELINE SYS BP: 181 MMHG
STRESS ESTIMATED WORKLOAD: 1 METS
STRESS EXERCISE DUR MIN: 9 MIN
STRESS EXERCISE DUR SEC: 10 SEC
STRESS PEAK DIAS BP: 104 MMHG
STRESS PEAK SYS BP: 203 MMHG
STRESS PERCENT HR ACHIEVED: 95 %
STRESS POST PEAK HR: 139 BPM
STRESS RATE PRESSURE PRODUCT: NORMAL BPM*MMHG
STRESS STAGE 1 BP: NORMAL MMHG
STRESS STAGE 1 DURATION: NORMAL MIN:SEC
STRESS STAGE 1 HR: 111 BPM
STRESS STAGE 2 BP: NORMAL MMHG
STRESS STAGE 2 DURATION: NORMAL MIN:SEC
STRESS STAGE 2 HR: 121 BPM
STRESS STAGE 3 DURATION: NORMAL MIN:SEC
STRESS STAGE 3 HR: 86 BPM
STRESS STAGE 4 BP: NORMAL MMHG
STRESS STAGE 4 DURATION: NORMAL MIN:SEC
STRESS STAGE 4 HR: 73 BPM
STRESS TARGET HR: 147 BPM

## 2022-05-23 PROCEDURE — 93351 STRESS TTE COMPLETE: CPT

## 2022-05-23 NOTE — PROGRESS NOTES
Pt tolerated exercise stress echocardiogram without difficulty. Pt's vitals returned to baseline prior to discharge. IV/Tele removed. Pt discharged. Pt instructed to take Labetolol once home.

## 2022-05-24 ENCOUNTER — TELEPHONE (OUTPATIENT)
Dept: CARDIOLOGY CLINIC | Age: 72
End: 2022-05-24

## 2022-05-24 NOTE — PROGRESS NOTES
Please call the patient and inform her stress test was normal.  Low concern for flow limiting blockages in the arteries of the heart.   May proceed with coronary calcium score

## 2022-05-24 NOTE — TELEPHONE ENCOUNTER
----- Message from Jes Andrade NP sent at 5/24/2022  8:17 AM EDT -----  Please call the patient and inform her stress test was normal.  Low concern for flow limiting blockages in the arteries of the heart.   May proceed with coronary calcium score

## 2022-05-31 ENCOUNTER — HOSPITAL ENCOUNTER (OUTPATIENT)
Dept: CT IMAGING | Age: 72
Discharge: HOME | End: 2022-05-31
Attending: INTERNAL MEDICINE

## 2022-05-31 DIAGNOSIS — Z00.00 PREVENTATIVE HEALTH CARE: ICD-10-CM

## 2022-05-31 PROCEDURE — 75571 CT HRT W/O DYE W/CA TEST: CPT

## 2022-06-11 NOTE — PROGRESS NOTES
Ms. Jones is a 67 y.o. female who is here for evaluation of   Chief Complaint   Patient presents with    Hypertension     States checking BPs \"Alot\". Reports BP has been improved. averages SBP 130s. Current home BP reading is 123/72    Referral Follow Up     Wants to review tests from [facility]    Colon Cancer Screening     Completed cologuard on 8/26/21 - not due currently   . ASSESSMENT AND PLAN:    1. Essential hypertension  Blood pressure is very well controlled and I have invited her to consider the possibility of anxiety attacks or panic attacks as a source for some of her symptoms. At this point not interested in cognitive behavioral therapy or medical therapy but will return for her annual wellness visit and she was invited to bring this up again for discussion if her symptoms persist.  Reassured that her cardiology evaluation which included a stress echo showed no evidence of cardiac problems. 2. Encounter for screening for colorectal malignant neoplasm  Her Cologuard exam was done in 2021 and she is up-to-date      No orders of the defined types were placed in this encounter. HPI  68 yo woman who has had multiple evaluations for BP--at times elevated and at others, it has been low and she has experienced dizziness with both presentations. She has been seen in clinic and in the ER.  BP, volume status and sx proved quite sensitive to diuretic therapy. Required IVF on one occasion for low BP after starting diuretic in March 2022. Evaluated by Dr Otf Foster with a normal EKG (NSR with 1rst degree AVB) and Stress ECHO in May 2022. April 2, 2022 Calcium score = 2.  K+ 4.2    She has a history of congenital nystagmus     ROS:  Denies  fever, chills, cough, chest pain, SOB,  nausea, vomiting, or diarrhea. Denies wt loss, wt gain, hemoptysis, hematochezia or melena.     Physical Examination:    Visit Vitals  /62 (BP 1 Location: Left upper arm, BP Patient Position: Sitting, BP Cuff Size: Adult long)   Pulse 68   Temp 97.9 °F (36.6 °C) (Temporal)   Resp 16   Ht 5' 6\" (1.676 m)   Wt 124 lb (56.2 kg)   SpO2 98%   BMI 20.01 kg/m²      General:  Alert, cooperative, no distress. Head:  Normocephalic, without obvious abnormality, atraumatic. Eyes:  Conjunctivae/corneas clear. Pupils equal, round, reactive to light. Extraocular movements intact. Lungs:   Clear to auscultation bilaterally. Chest wall:  No tenderness or deformity. Cardiac:  RRR   Abdomen:   Soft, non-tender. Bowel sounds normal. No masses. No organomegaly. Extremities: Extremities normal, atraumatic, no cyanosis or edema. Pulses: 2+ and symmetric all extremities. Skin: Skin color, texture, turgor normal. No rashes or lesions. Lymph nodes: Cervical, supraclavicular, and axillary nodes normal.   Neurologic: CNII-XII intact. Normal strength, sensation, and reflexes throughout. On this date 06/16/2022 I have spent 30 minutes reviewing previous notes, test results and face to face with the patient discussing the diagnosis and importance of compliance with the treatment plan as well as documenting on the day of the visit.     Bhavana Ignacio MD FACP    (signed electronically) on 6/16/2022 at 9:30 AM

## 2022-06-16 ENCOUNTER — OFFICE VISIT (OUTPATIENT)
Dept: FAMILY MEDICINE CLINIC | Age: 72
End: 2022-06-16
Payer: MEDICARE

## 2022-06-16 VITALS
HEIGHT: 66 IN | TEMPERATURE: 97.9 F | WEIGHT: 124 LBS | DIASTOLIC BLOOD PRESSURE: 62 MMHG | SYSTOLIC BLOOD PRESSURE: 118 MMHG | HEART RATE: 68 BPM | BODY MASS INDEX: 19.93 KG/M2 | OXYGEN SATURATION: 98 % | RESPIRATION RATE: 16 BRPM

## 2022-06-16 DIAGNOSIS — Z12.11 ENCOUNTER FOR SCREENING FOR COLORECTAL MALIGNANT NEOPLASM: ICD-10-CM

## 2022-06-16 DIAGNOSIS — I10 ESSENTIAL HYPERTENSION: Primary | ICD-10-CM

## 2022-06-16 DIAGNOSIS — Z12.12 ENCOUNTER FOR SCREENING FOR COLORECTAL MALIGNANT NEOPLASM: ICD-10-CM

## 2022-06-16 PROCEDURE — 99214 OFFICE O/P EST MOD 30 MIN: CPT | Performed by: INTERNAL MEDICINE

## 2022-06-16 NOTE — PROGRESS NOTES
Kellen Montez is a 67 y.o. female presenting for/with:    Chief Complaint   Patient presents with    Hypertension     States checking BPs \"Alot\". Reports BP has been improved. averages SBP 130s. Current home BP reading is 123/72    Referral Follow Up     Wants to review tests from [facility]    Colon Cancer Screening     Completed cologuard on 8/26/21 - not due currently       Visit Vitals  /62 (BP 1 Location: Left upper arm, BP Patient Position: Sitting, BP Cuff Size: Adult long)   Pulse 68   Temp 97.9 °F (36.6 °C) (Temporal)   Resp 16   Ht 5' 6\" (1.676 m)   Wt 124 lb (56.2 kg)   SpO2 98%   BMI 20.01 kg/m²     Pain Scale: 0 - No pain/10  Pain Location:     1. \"Have you been to the ER, urgent care clinic since your last visit? Hospitalized since your last visit? \" No    2. \"Have you seen or consulted any other health care providers outside of the [address] [place] [address] since your last visit? \" Yes Reason for visit: Dr Casandra Hsu     3. For patients aged 39-70: Has the patient had a colonoscopy / FIT/ Cologuard? Yes - no Care Gap present      If the patient is female:    4. For patients aged 41-77: Has the patient had a mammogram within the past 2 years? Yes - no Care Gap present      5. For patients aged 21-65: Has the patient had a pap smear?  NA - based on age or sex      Symptom review:  NO  Fever   NO  Shaking chills  NO  Cough  NO  Body aches  NO  Coughing up blood  NO  Chest congestion  NO  Chest pain  NO  Shortness of breath  NO  Profound Loss of smell/taste  NO  Nausea/Vomiting   NO  Loose stool/Diarrhea  NO  any skin issues    Patient Risk Factors Reviewed as follows:  NO  have you been in Close contact with confirmed COVID19 patient   NO  History of recent travel to affected geographical areas within the past 14 days  NO  COPD  NO  Active Cancer/Leukemia/Lymphoma/Chemotherapy  NO  Oral steroid use  NO  Pregnant  NO  Diabetes Mellitus  YES  Heart disease  NO  Asthma  NO Health care worker at home  NO Health care worker  NO Is there a Pregnant Woman in the home  NO Dialysis pt in the home   NO a large number of people living in the home    Learning Assessment 7/19/2021   PRIMARY LEARNER Patient   PRIMARY LANGUAGE ENGLISH   LEARNER PREFERENCE PRIMARY DEMONSTRATION   ANSWERED BY patient   RELATIONSHIP SELF     Fall Risk Assessment, last 12 mths 6/16/2022   Able to walk? Yes   Fall in past 12 months? 1   Do you feel unsteady? 0   Are you worried about falling 0   Is TUG test greater than 12 seconds? -   Is the gait abnormal? 0   Number of falls in past 12 months 1   Fall with injury? 1       3 most recent PHQ Screens 6/16/2022   PHQ Not Done -   Little interest or pleasure in doing things Not at all   Feeling down, depressed, irritable, or hopeless Not at all   Total Score PHQ 2 0     Abuse Screening Questionnaire 6/16/2022   Do you ever feel afraid of your partner? N   Are you in a relationship with someone who physically or mentally threatens you? N   Is it safe for you to go home?  Y       ADL Assessment 6/16/2022   Feeding yourself No Help Needed   Getting from bed to chair No Help Needed   Getting dressed No Help Needed   Bathing or showering No Help Needed   Walk across the room (includes cane/walker) No Help Needed   Using the telphone No Help Needed   Taking your medications No Help Needed   Preparing meals No Help Needed   Managing money (expenses/bills) No Help Needed   Moderately strenuous housework (laundry) No Help Needed   Shopping for personal items (toiletries/medicines) No Help Needed   Shopping for groceries No Help Needed   Driving No Help Needed   Climbing a flight of stairs No Help Needed   Getting to places beyond walking distances No Help Needed      Advance Care Planning 8/5/2021   Patient's Healthcare Decision Maker is: -   Confirm Advance Directive Yes, on file

## 2022-07-08 ENCOUNTER — TELEPHONE (OUTPATIENT)
Dept: FAMILY MEDICINE CLINIC | Age: 72
End: 2022-07-08

## 2022-07-08 NOTE — TELEPHONE ENCOUNTER
Spoke with patient. Slight sinus pressure, nasal irritation. No fevers ect. Advised saline spray, nasal saline rinse. May try OTC allergy medication (advised to check with pharmacy for interactions) Call office if no better or worse. Pt verb understanding. Cuate Cortez

## 2022-07-08 NOTE — TELEPHONE ENCOUNTER
----- Message from [facility] sent at 7/8/2022  7:50 AM EDT -----  Subject: Appointment Request    Reason for Call: Established Patient Appointment needed: Semi-Routine   Cough, Cold Symptoms    QUESTIONS    Reason for appointment request? Available appointments did not meet   patient need     Additional Information for Provider? Patient states she has nasal   irritation and some sinus pressure behind her eyes; no other symptoms. She   is requesting advise from PCP regarding how to treat. Requesting a call   back.   ---------------------------------------------------------------------------  --------------  Raz Bergeron RB  [phone]; OK to leave message on voicemail, OK to respond with   electronic message via [employer] portal (only for patients who have   registered [employer] account)  ---------------------------------------------------------------------------  --------------  SCRIPT ANSWERS  COVID Screen: Krista Gaston

## 2022-08-02 NOTE — PROGRESS NOTES
Ms. Jones is a 67 y.o. female who is here for evaluation of   Chief Complaint   Patient presents with    Annual Wellness Visit    Hypertension     Routine F/U. States BP has been \"alarmingly good\" at home. States will occasionally feel lightheaded. Reports feeling much better    Immunization/Injection     COVID booster updated   . ASSESSMENT AND PLAN:    1. Medicare annual wellness visit, subsequent  2. Essential hypertension  She is at goal      No orders of the defined types were placed in this encounter. HPI  76-year-old patient who arrives for subsequent annual wellness visit. Earlier this year she was seen several times in clinic in addition to the ER with complaints related to blood pressure. At times her blood pressure was elevated and then at other times it was quite low. She remains on labetalol. During her last evaluation on June 16 we discussed the possibility of health-related anxiety or even panic as a source of some of her labile hypertension symptoms. ROS:  Denies fever, chills, cough, chest pain, SOB,  nausea, vomiting, or diarrhea. Denies wt loss, wt gain, hemoptysis, hematochezia or melena. Physical Examination:    Visit Vitals  /68 (BP 1 Location: Left upper arm, BP Patient Position: Sitting, BP Cuff Size: Adult long)   Pulse 84   Temp 98.2 °F (36.8 °C) (Temporal)   Resp 16   Ht 5' 6\" (1.676 m)   Wt 122 lb 12.8 oz (55.7 kg)   SpO2 97%   BMI 19.82 kg/m²      General:  Alert, cooperative, no distress. Head:  Normocephalic, without obvious abnormality, atraumatic. Eyes:  Conjunctivae/corneas clear. Pupils equal, round, reactive to light. Extraocular movements intact. Nystagmus noted bilaterally   Lungs:   Clear to auscultation bilaterally. Chest wall:  No tenderness or deformity. Cardiac:  RRR   Abdomen:   Soft, non-tender. Bowel sounds normal. No masses. No organomegaly. Extremities: Extremities normal, atraumatic, no cyanosis or edema.    Pulses: 2+ and symmetric all extremities. Skin: Skin color, texture, turgor normal. No rashes or lesions. Lymph nodes: Cervical, supraclavicular, and axillary nodes normal.   Neurologic: CNII-XII intact. Normal strength, sensation, and reflexes throughout. On this date 08/04/2022 I have spent 30 minutes reviewing previous notes, test results and face to face with the patient discussing the diagnosis and importance of compliance with the treatment plan as well as documenting on the day of the visit.     Sanjana Beckwith MD FACP    (signed electronically) on 8/4/2022 at 9:59 AM

## 2022-08-04 ENCOUNTER — OFFICE VISIT (OUTPATIENT)
Dept: FAMILY MEDICINE CLINIC | Age: 72
End: 2022-08-04
Payer: MEDICARE

## 2022-08-04 VITALS
DIASTOLIC BLOOD PRESSURE: 68 MMHG | OXYGEN SATURATION: 97 % | BODY MASS INDEX: 19.73 KG/M2 | RESPIRATION RATE: 16 BRPM | SYSTOLIC BLOOD PRESSURE: 116 MMHG | HEIGHT: 66 IN | TEMPERATURE: 98.2 F | WEIGHT: 122.8 LBS | HEART RATE: 84 BPM

## 2022-08-04 DIAGNOSIS — I10 ESSENTIAL HYPERTENSION: ICD-10-CM

## 2022-08-04 DIAGNOSIS — Z00.00 MEDICARE ANNUAL WELLNESS VISIT, SUBSEQUENT: Primary | ICD-10-CM

## 2022-08-04 PROCEDURE — G0439 PPPS, SUBSEQ VISIT: HCPCS | Performed by: INTERNAL MEDICINE

## 2022-08-04 PROCEDURE — 99213 OFFICE O/P EST LOW 20 MIN: CPT | Performed by: INTERNAL MEDICINE

## 2022-08-04 NOTE — PROGRESS NOTES
Clementine Waldron is a 67 y.o. female presenting for/with:    Chief Complaint   Patient presents with    Annual Wellness Visit    Hypertension     Routine F/U. States BP has been \"alarmingly good\" at home. States will occasionally feel lightheaded. Reports feeling much better    Immunization/Injection     COVID booster updated       Visit Vitals  /68 (BP 1 Location: Left upper arm, BP Patient Position: Sitting, BP Cuff Size: Adult long)   Pulse 84   Temp 98.2 °F (36.8 °C) (Temporal)   Resp 16   Ht 5' 6\" (1.676 m)   Wt 122 lb 12.8 oz (55.7 kg)   SpO2 97%   BMI 19.82 kg/m²     Pain Scale: 0 - No pain/10  Pain Location:     1. \"Have you been to the ER, urgent care clinic since your last visit? Hospitalized since your last visit? \" No    2. \"Have you seen or consulted any other health care providers outside of the [address] [place] [address] since your last visit? \" No     3. For patients aged 39-70: Has the patient had a colonoscopy / FIT/ Cologuard? Yes - no Care Gap present      If the patient is female:    4. For patients aged 41-77: Has the patient had a mammogram within the past 2 years? Yes - no Care Gap present      5. For patients aged 21-65: Has the patient had a pap smear?  NA - based on age or sex      Symptom review:  NO  Fever   NO  Shaking chills  NO  Cough  NO  Body aches  NO  Coughing up blood  NO  Chest congestion  NO  Chest pain  NO  Shortness of breath  NO  Profound Loss of smell/taste  NO  Nausea/Vomiting   NO  Loose stool/Diarrhea  NO  any skin issues    Patient Risk Factors Reviewed as follows:  NO  have you been in Close contact with confirmed COVID19 patient   NO  History of recent travel to affected geographical areas within the past 14 days  NO  COPD  NO  Active Cancer/Leukemia/Lymphoma/Chemotherapy  NO  Oral steroid use  NO  Pregnant  NO Diabetes Mellitus  YES  Heart disease  NO  Asthma  NO Health care worker at home  NO Health care worker  NO Is there a Pregnant Woman in the home  NO Dialysis pt in the home   NO a large number of people living in the home    Learning Assessment 8/4/2022   PRIMARY LEARNER Patient   PRIMARY LANGUAGE ENGLISH   LEARNER PREFERENCE PRIMARY VIDEOS     PICTURES   ANSWERED BY self   RELATIONSHIP SELF     Fall Risk Assessment, last 12 mths 8/4/2022   Able to walk? Yes   Fall in past 12 months? 0   Do you feel unsteady? 0   Are you worried about falling 0   Is TUG test greater than 12 seconds? -   Is the gait abnormal? -   Number of falls in past 12 months -   Fall with injury? -       3 most recent PHQ Screens 8/4/2022   PHQ Not Done -   Little interest or pleasure in doing things Not at all   Feeling down, depressed, irritable, or hopeless Not at all   Total Score PHQ 2 0     Abuse Screening Questionnaire 8/4/2022   Do you ever feel afraid of your partner? N   Are you in a relationship with someone who physically or mentally threatens you? N   Is it safe for you to go home?  Y       ADL Assessment 8/4/2022   Feeding yourself No Help Needed   Getting from bed to chair No Help Needed   Getting dressed No Help Needed   Bathing or showering No Help Needed   Walk across the room (includes cane/walker) No Help Needed   Using the telphone No Help Needed   Taking your medications No Help Needed   Preparing meals No Help Needed   Managing money (expenses/bills) No Help Needed   Moderately strenuous housework (laundry) No Help Needed   Shopping for personal items (toiletries/medicines) No Help Needed   Shopping for groceries No Help Needed   Driving No Help Needed   Climbing a flight of stairs No Help Needed   Getting to places beyond walking distances No Help Needed      Advance Care Planning 8/4/2022   Patient's Healthcare Decision Maker is: Named in scanned ACP document   Confirm Advance Directive Yes, on file        This is the Subsequent Medicare Annual Wellness Exam, performed 12 months or more after the Initial AWV or the last Subsequent AWV    I have reviewed the patient's medical history in detail and updated the computerized patient record. Assessment/Plan   Education and counseling provided:  Are appropriate based on today's review and evaluation    1. Medicare annual wellness visit, subsequent  2. Essential hypertension       Depression Risk Factor Screening     3 most recent PHQ Screens 8/4/2022   PHQ Not Done -   Little interest or pleasure in doing things Not at all   Feeling down, depressed, irritable, or hopeless Not at all   Total Score PHQ 2 0       Alcohol & Drug Abuse Risk Screen    Do you average more than 1 drink per night or more than 7 drinks a week:  Yes    On any one occasion in the past three months have you have had more than 3 drinks containing alcohol:  No          Functional Ability and Level of Safety    Hearing: Hearing is good. Activities of Daily Living: The home contains: no safety equipment. Patient does total self care      Ambulation: with no difficulty     Fall Risk:  Fall Risk Assessment, last 12 mths 8/4/2022   Able to walk? Yes   Fall in past 12 months? 0   Do you feel unsteady? 0   Are you worried about falling 0   Is TUG test greater than 12 seconds? -   Is the gait abnormal? -   Number of falls in past 12 months -   Fall with injury? -      Abuse Screen:  Patient is not abused       Cognitive Screening    Has your family/caregiver stated any concerns about your memory: no     Health Maintenance Due   There are no preventive care reminders to display for this patient.       Patient Care Team   Patient Care Team:  Jordy Beck MD as PCP - General (Internal Medicine Physician)  Jordy Beck MD as PCP - [facility]  To Cabral MD (Inactive) (General Surgery)  Ayana Dalton MD as Physician (Cardio Vascular Surgery)    History     Patient Active Problem List   Diagnosis Code    Nonspecific abnormal finding in stool contents R19.5    Essential hypertension I10    Actinic keratoses L57.0    UTI (lower urinary tract infection) N39.0     Past Medical History:   Diagnosis Date    Basal cell carcinoma     excised from nose and clavicl    Cerumen impaction     Endocrine disorder     Hypertension     Menopause     age 47    Nystagmus     OA (osteoarthritis) of finger     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 Indications: high blood pressure 180 Tablet 4     Allergies   Allergen Reactions    Mollusks Nausea and Vomiting    Shellfish Derived Nausea and Vomiting     Mullusks    Sulfa (Sulfonamide Antibiotics) Other (comments)    Sulfur Hives       Family History   Problem Relation Age of Onset    OSTEOARTHRITIS Father         rheumatoid    Diabetes Father         late in life    Hypertension Father     Breast Cancer Mother         dx 80    Cancer Mother         breast, age 80    Stroke Mother         when 36 yrs old    Thyroid Cancer Sister     Breast Cancer Sister         dx age 42's    Diabetes Sister     Diabetes Sister         Peripheral neuropathy    Cancer Sister         Breast, CLL     Social History     Tobacco Use    Smoking status: Never    Smokeless tobacco: Never   Substance Use Topics    Alcohol use:  Yes     Alcohol/week: 18.0 standard drinks     Types: 9 Glasses of wine, 9 Standard drinks or equivalent per week         [address] [facility]

## 2022-08-04 NOTE — PATIENT INSTRUCTIONS
Medicare Wellness Visit, Female     The best way to live healthy is to have a lifestyle where you eat a well-balanced diet, exercise regularly, limit alcohol use, and quit all forms of tobacco/nicotine, if applicable. Regular preventive services are another way to keep healthy. Preventive services (vaccines, screening tests, monitoring & exams) can help personalize your care plan, which helps you manage your own care. Screening tests can find health problems at the earliest stages, when they are easiest to treat. [facility] follows the current, evidence-based guidelines published by the [facility] ([facility]) when recommending preventive services for our patients. Because we follow these guidelines, sometimes recommendations change over time as research supports it. (For example, mammograms used to be recommended annually. Even though Medicare will still pay for an annual mammogram, the newer guidelines recommend a mammogram every two years for women of average risk). Of course, you and your doctor may decide to screen more often for some diseases, based on your risk and your co-morbidities (chronic disease you are already diagnosed with). Preventive services for you include:  - Medicare offers their members a free annual wellness visit, which is time for you and your primary care provider to discuss and plan for your preventive service needs. Take advantage of this benefit every year!  -All adults over the age of 72 should receive the recommended pneumonia vaccines. Current USPSTF guidelines recommend a series of two vaccines for the best pneumonia protection.   -All adults should have a flu vaccine yearly and a tetanus vaccine every 10 years.   -All adults age 48 and older should receive the shingles vaccines (series of two vaccines).       -All adults age 38-68 who are overweight should have a diabetes screening test once every three years.   -All adults born between [birth year] and 1965 should be screened once for Hepatitis C.  -Other screening tests and preventive services for persons with diabetes include: an eye exam to screen for diabetic retinopathy, a kidney function test, a foot exam, and stricter control over your cholesterol.   -Cardiovascular screening for adults with routine risk involves an electrocardiogram (ECG) at intervals determined by your doctor.   -Colorectal cancer screenings should be done for adults age 54-65 with no increased risk factors for colorectal cancer. There are a number of acceptable methods of screening for this type of cancer. Each test has its own benefits and drawbacks. Discuss with your doctor what is most appropriate for you during your annual wellness visit. The different tests include: colonoscopy (considered the best screening method), a fecal occult blood test, a fecal DNA test, and sigmoidoscopy.    -A bone mass density test is recommended when a woman turns 65 to screen for osteoporosis. This test is only recommended one time, as a screening. Some providers will use this same test as a disease monitoring tool if you already have osteoporosis. -Breast cancer screenings are recommended every other year for women of normal risk, age 54-69.  -Cervical cancer screenings for women over age 72 are only recommended with certain risk factors. Here is a list of your current Health Maintenance items (your personalized list of preventive services) with a due date: There are no preventive care reminders to display for this patient.

## 2022-09-09 ENCOUNTER — PATIENT MESSAGE (OUTPATIENT)
Dept: FAMILY MEDICINE CLINIC | Age: 72
End: 2022-09-09

## 2022-09-09 DIAGNOSIS — Z12.31 ENCOUNTER FOR SCREENING MAMMOGRAM FOR MALIGNANT NEOPLASM OF BREAST: Primary | ICD-10-CM

## 2022-09-12 NOTE — TELEPHONE ENCOUNTER
From: Carina Denton May  To: Phyllis Lee MD  Sent: 9/9/2022 12:24 PM EDT  Subject: mammogram?    Hi - I was reviewing my info and see that a yearly mammogram for my left breast is recommended. My last was 9.2.21. Do I simply call \[facility]\"" for an appt?   Andrew Eric

## 2022-10-11 ENCOUNTER — PATIENT MESSAGE (OUTPATIENT)
Dept: FAMILY MEDICINE CLINIC | Age: 72
End: 2022-10-11

## 2022-10-11 NOTE — TELEPHONE ENCOUNTER
From: Kian Baca May  To: Sharath Chris NP  Sent: 10/11/2022 10:43 AM EDT  Subject: refill     Good morning, Gracy[n?] Anil Brandon sent me an email that says my Labetalol refill was not approved. What is my next step? I have a couple more weeks supply.   Hannah Pastor

## 2022-11-17 ENCOUNTER — HOSPITAL ENCOUNTER (OUTPATIENT)
Dept: MAMMOGRAPHY | Age: 72
Discharge: HOME | End: 2022-11-17
Attending: INTERNAL MEDICINE
Payer: MEDICARE

## 2022-11-17 DIAGNOSIS — Z12.31 ENCOUNTER FOR SCREENING MAMMOGRAM FOR MALIGNANT NEOPLASM OF BREAST: ICD-10-CM

## 2022-11-17 PROCEDURE — 77063 BREAST TOMOSYNTHESIS BI: CPT

## 2023-01-16 ENCOUNTER — OFFICE VISIT (OUTPATIENT)
Dept: FAMILY MEDICINE CLINIC | Age: 73
End: 2023-01-16
Payer: MEDICARE

## 2023-01-16 ENCOUNTER — HOSPITAL ENCOUNTER (OUTPATIENT)
Dept: GENERAL RADIOLOGY | Age: 73
Discharge: HOME | End: 2023-01-16
Payer: MEDICARE

## 2023-01-16 VITALS
WEIGHT: 121.8 LBS | HEIGHT: 66 IN | OXYGEN SATURATION: 98 % | SYSTOLIC BLOOD PRESSURE: 100 MMHG | DIASTOLIC BLOOD PRESSURE: 58 MMHG | RESPIRATION RATE: 18 BRPM | BODY MASS INDEX: 19.58 KG/M2 | HEART RATE: 74 BPM

## 2023-01-16 DIAGNOSIS — M54.6 THORACIC SPINE PAIN: ICD-10-CM

## 2023-01-16 DIAGNOSIS — I10 ESSENTIAL HYPERTENSION: ICD-10-CM

## 2023-01-16 DIAGNOSIS — M25.561 PATELLAR PAIN, RIGHT: Primary | ICD-10-CM

## 2023-01-16 PROCEDURE — 3017F COLORECTAL CA SCREEN DOC REV: CPT | Performed by: INTERNAL MEDICINE

## 2023-01-16 PROCEDURE — G8399 PT W/DXA RESULTS DOCUMENT: HCPCS | Performed by: INTERNAL MEDICINE

## 2023-01-16 PROCEDURE — 1123F ACP DISCUSS/DSCN MKR DOCD: CPT | Performed by: INTERNAL MEDICINE

## 2023-01-16 PROCEDURE — G8510 SCR DEP NEG, NO PLAN REQD: HCPCS | Performed by: INTERNAL MEDICINE

## 2023-01-16 PROCEDURE — G8427 DOCREV CUR MEDS BY ELIG CLIN: HCPCS | Performed by: INTERNAL MEDICINE

## 2023-01-16 PROCEDURE — 99213 OFFICE O/P EST LOW 20 MIN: CPT | Performed by: INTERNAL MEDICINE

## 2023-01-16 PROCEDURE — 71046 X-RAY EXAM CHEST 2 VIEWS: CPT

## 2023-01-16 PROCEDURE — G9899 SCRN MAM PERF RSLTS DOC: HCPCS | Performed by: INTERNAL MEDICINE

## 2023-01-16 PROCEDURE — 1101F PT FALLS ASSESS-DOCD LE1/YR: CPT | Performed by: INTERNAL MEDICINE

## 2023-01-16 PROCEDURE — 3078F DIAST BP <80 MM HG: CPT | Performed by: INTERNAL MEDICINE

## 2023-01-16 PROCEDURE — G8420 CALC BMI NORM PARAMETERS: HCPCS | Performed by: INTERNAL MEDICINE

## 2023-01-16 PROCEDURE — 3074F SYST BP LT 130 MM HG: CPT | Performed by: INTERNAL MEDICINE

## 2023-01-16 PROCEDURE — G8536 NO DOC ELDER MAL SCRN: HCPCS | Performed by: INTERNAL MEDICINE

## 2023-01-16 PROCEDURE — 1090F PRES/ABSN URINE INCON ASSESS: CPT | Performed by: INTERNAL MEDICINE

## 2023-01-16 NOTE — PROGRESS NOTES
Ruth Walker is a 67 y.o. female presenting for/with:    Chief Complaint   Patient presents with    Back Pain     C/O banded back and chest pain. States pain is across the upper back and goes around the bra line. Ws present last week and OTC which was effective    Knee Pain     C/O new onset (R) knee cap pain after taking a nap yesterday. Was on google and contacted on call provider. Denies any trauma. Denies any swelling. Visit Vitals  BP (!) 100/58 (BP 1 Location: Left upper arm, BP Patient Position: Sitting, BP Cuff Size: Adult long)   Pulse 74   Resp 18   Ht 5' 6\" (1.676 m)   Wt 121 lb 12.8 oz (55.2 kg)   SpO2 98%   BMI 19.66 kg/m²     Pain Scale: 3/10  Pain Location: Knee    1. \"Have you been to the ER, urgent care clinic since your last visit? Hospitalized since your last visit? \" No    2. \"Have you seen or consulted any other health care providers outside of the [address] [place] [address] since your last visit? \" No     3. For patients aged 39-70: Has the patient had a colonoscopy / FIT/ Cologuard? Yes - no Care Gap present      If the patient is female:    4. For patients aged 41-77: Has the patient had a mammogram within the past 2 years? Yes - no Care Gap present      5. For patients aged 21-65: Has the patient had a pap smear? NA - based on age or sex      Learning Assessment 8/4/2022   PRIMARY LEARNER Patient   PRIMARY LANGUAGE ENGLISH   LEARNER PREFERENCE PRIMARY VIDEOS     PICTURES   ANSWERED BY self   RELATIONSHIP SELF     Fall Risk Assessment, last 12 mths 1/16/2023   Able to walk? Yes   Fall in past 12 months? 0   Do you feel unsteady? 0   Are you worried about falling 0   Is TUG test greater than 12 seconds? -   Is the gait abnormal? -   Number of falls in past 12 months -   Fall with injury?  -       3 most recent PHQ Screens 1/16/2023   PHQ Not Done -   Little interest or pleasure in doing things Not at all   Feeling down, depressed, irritable, or hopeless Not at all   Total Score PHQ 2 0 Abuse Screening Questionnaire 1/16/2023   Do you ever feel afraid of your partner? N   Are you in a relationship with someone who physically or mentally threatens you? N   Is it safe for you to go home?  Y       ADL Assessment 1/16/2023   Feeding yourself No Help Needed   Getting from bed to chair No Help Needed   Getting dressed No Help Needed   Bathing or showering No Help Needed   Walk across the room (includes cane/walker) No Help Needed   Using the telphone No Help Needed   Taking your medications No Help Needed   Preparing meals No Help Needed   Managing money (expenses/bills) No Help Needed   Moderately strenuous housework (laundry) No Help Needed   Shopping for personal items (toiletries/medicines) No Help Needed   Shopping for groceries No Help Needed   Driving No Help Needed   Climbing a flight of stairs No Help Needed   Getting to places beyond walking distances No Help Needed      Advance Care Planning 8/4/2022   Patient's Healthcare Decision Maker is: Named in scanned ACP document   Confirm Advance Directive Yes, on file

## 2023-01-16 NOTE — PROGRESS NOTES
Chief Complaint   Patient presents with    Back Pain     C/O banded back and chest pain. States pain is across the upper back and goes around the bra line. Ws present last week and OTC which was effective    Knee Pain     C/O new onset (R) knee cap pain after taking a nap yesterday. Was on google and contacted on call provider. Denies any trauma. Denies any swelling. ASSESSMENT AND PLAN:    1. Patellar pain, right  Likely due to OA. Currently asymptomatic. 2. Thoracic spine pain  Possibly due to a T6 compression fracture or possibly spinal stenosis at that level. Checking x-ray to exclude anatomic lesion within the thorax  - XR CHEST PA LAT; Future      Orders Placed This Encounter    XR CHEST PA LAT     Standing Status:   Future     Standing Expiration Date:   2/16/2023     Scheduling Instructions:      hospitals     Order Specific Question:   Reason for Exam     Answer:   circumferential chest pain     Order Specific Question:   Which facility to perform procedure? Answer:   Lizbeth Hernandez MD, FACP      HPI:         is a 67 y.o. female who arrives with an acute concern of right patellar pain which she experienced yesterday and has completely resolved. No history of gout. Also is concerned about a midthoracic pain that comes and goes and is somewhat circumferential at about the level of T6. She denies any recent exertional precipitant such as raking leaves, cutting grass or ascending stairs. Allergies   Allergen Reactions    Mollusks Nausea and Vomiting    Shellfish Derived Nausea and Vomiting     Mullusks    Sulfa (Sulfonamide Antibiotics) Other (comments)    Sulfur Hives       Current Outpatient Medications   Medication Sig    labetaloL (NORMODYNE) 200 mg tablet TAKE 1 TABLET BY MOUTH TWICE DAILY FOR HIGH BLOOD PRESSURE     No current facility-administered medications for this visit.        Past Medical History:   Diagnosis Date    Basal cell carcinoma     excised from nose and clavicl    Cerumen impaction     Endocrine disorder     Hypertension     Menopause     age 47    Nystagmus     OA (osteoarthritis) of finger     Peripheral sensory neuropathy age 46    mild    Thyroid nodule     UTI (lower urinary tract infection)     recurrent         ROS:  Denies fever, chills, cough, chest pain, SOB,  nausea, vomiting, or diarrhea. Denies wt loss, wt gain, hemoptysis, hematochezia or melena. Physical Examination:    Visit Vitals  BP (!) 100/58 (BP 1 Location: Left upper arm, BP Patient Position: Sitting, BP Cuff Size: Adult long)   Pulse 74   Resp 18   Ht 5' 6\" (1.676 m)   Wt 121 lb 12.8 oz (55.2 kg)   SpO2 98%   BMI 19.66 kg/m²      General:  Alert, cooperative, no distress. Head:  Normocephalic, without obvious abnormality, atraumatic. Eyes:  Conjunctivae/corneas clear. Pupils equal, round, reactive to light. Chest wall:  No tenderness or deformity. Extremities: Extremities normal, atraumatic, no cyanosis or edema.    Skin:  No rash   Lymph nodes: Cervical and supraclavicular nodes are normal.   Neurologic: Moves all extremities, fluent speech

## 2023-01-17 RX ORDER — LABETALOL 200 MG/1
TABLET, FILM COATED ORAL
Qty: 180 TABLET | Refills: 0 | Status: SHIPPED | OUTPATIENT
Start: 2023-01-17

## 2023-03-07 ENCOUNTER — CLINICAL SUPPORT (OUTPATIENT)
Dept: FAMILY MEDICINE CLINIC | Age: 73
End: 2023-03-07
Payer: MEDICARE

## 2023-03-07 DIAGNOSIS — H61.23 BILATERAL IMPACTED CERUMEN: Primary | ICD-10-CM

## 2023-03-07 PROCEDURE — 69209 REMOVE IMPACTED EAR WAX UNI: CPT | Performed by: INTERNAL MEDICINE

## 2023-03-07 NOTE — PROGRESS NOTES
Subjective: Kuldeep Camacho is a 67 y.o. female who presents for evaluation of a plugged ear. She has noticed bilateral symptoms a few weeks ago. There is a prior history of cerumen impaction. Patient denies ear pain. Patient denies hearing loss. Objective: There were no vitals taken for this visit. General: alert, cooperative, no distress   Right Ear: ceruminosis noted. Left Ear:  ceruminosis noted. After removal: bilateral TM's and external ear canals normal, cerumen removed. Oropharynx:   normal.   Neck:  supple, symmetrical, trachea midline and no adenopathy. Assessment/Plan:     Cerumen Impaction, without otitis externa. Cerumen removed by flushing, currettage. Care instructions given. Home treatment: none  Followup as needed. current treatment plan is effective, no change in therapy.

## 2023-03-13 ENCOUNTER — TELEPHONE (OUTPATIENT)
Dept: FAMILY MEDICINE CLINIC | Age: 73
End: 2023-03-13

## 2023-03-13 NOTE — TELEPHONE ENCOUNTER
Pt called this morning c/o congestion and just not feeling like herself pt would like a callback from nurse at [phone], pt was looking to get in to see provider today, no available appts.

## 2023-04-25 ENCOUNTER — OFFICE VISIT (OUTPATIENT)
Dept: CARDIOLOGY CLINIC | Age: 73
End: 2023-04-25
Payer: MEDICARE

## 2023-04-25 VITALS
SYSTOLIC BLOOD PRESSURE: 120 MMHG | DIASTOLIC BLOOD PRESSURE: 80 MMHG | RESPIRATION RATE: 18 BRPM | OXYGEN SATURATION: 96 % | HEIGHT: 66 IN | BODY MASS INDEX: 18.96 KG/M2 | HEART RATE: 74 BPM | WEIGHT: 118 LBS

## 2023-04-25 DIAGNOSIS — R09.89 LABILE HYPERTENSION: ICD-10-CM

## 2023-04-25 DIAGNOSIS — I10 ESSENTIAL HYPERTENSION: Primary | ICD-10-CM

## 2023-04-25 DIAGNOSIS — I95.1 ORTHOSTATIC HYPOTENSION: ICD-10-CM

## 2023-04-25 PROCEDURE — G8420 CALC BMI NORM PARAMETERS: HCPCS | Performed by: INTERNAL MEDICINE

## 2023-04-25 PROCEDURE — G9899 SCRN MAM PERF RSLTS DOC: HCPCS | Performed by: INTERNAL MEDICINE

## 2023-04-25 PROCEDURE — 3078F DIAST BP <80 MM HG: CPT | Performed by: INTERNAL MEDICINE

## 2023-04-25 PROCEDURE — 1090F PRES/ABSN URINE INCON ASSESS: CPT | Performed by: INTERNAL MEDICINE

## 2023-04-25 PROCEDURE — G8510 SCR DEP NEG, NO PLAN REQD: HCPCS | Performed by: INTERNAL MEDICINE

## 2023-04-25 PROCEDURE — 1123F ACP DISCUSS/DSCN MKR DOCD: CPT | Performed by: INTERNAL MEDICINE

## 2023-04-25 PROCEDURE — 1101F PT FALLS ASSESS-DOCD LE1/YR: CPT | Performed by: INTERNAL MEDICINE

## 2023-04-25 PROCEDURE — G8536 NO DOC ELDER MAL SCRN: HCPCS | Performed by: INTERNAL MEDICINE

## 2023-04-25 PROCEDURE — G8399 PT W/DXA RESULTS DOCUMENT: HCPCS | Performed by: INTERNAL MEDICINE

## 2023-04-25 PROCEDURE — 99213 OFFICE O/P EST LOW 20 MIN: CPT | Performed by: INTERNAL MEDICINE

## 2023-04-25 PROCEDURE — G0463 HOSPITAL OUTPT CLINIC VISIT: HCPCS | Performed by: INTERNAL MEDICINE

## 2023-04-25 PROCEDURE — 3074F SYST BP LT 130 MM HG: CPT | Performed by: INTERNAL MEDICINE

## 2023-04-25 PROCEDURE — 3017F COLORECTAL CA SCREEN DOC REV: CPT | Performed by: INTERNAL MEDICINE

## 2023-04-25 PROCEDURE — 93005 ELECTROCARDIOGRAM TRACING: CPT | Performed by: INTERNAL MEDICINE

## 2023-04-25 PROCEDURE — 93010 ELECTROCARDIOGRAM REPORT: CPT | Performed by: INTERNAL MEDICINE

## 2023-04-25 PROCEDURE — G8427 DOCREV CUR MEDS BY ELIG CLIN: HCPCS | Performed by: INTERNAL MEDICINE

## 2023-04-25 RX ORDER — FLUTICASONE PROPIONATE 50 MCG
SPRAY, SUSPENSION (ML) NASAL
COMMUNITY
Start: 2023-04-21

## 2023-04-25 NOTE — PROGRESS NOTES
Anna [address], [place], [address] [place] [address]  [phone]    [address] [place] [address], [facility]      Subjective: Adilene Sanchez is a 75 y.o. female is here for routine f/u. The patient was last seen by us in April 2022. Today, the patient denies chest pain/ shortness of breath, orthopnea, PND, LE edema, palpitations, syncope, or presyncope.        Patient Active Problem List    Diagnosis Date Noted    UTI (lower urinary tract infection)     Essential hypertension 06/19/2015    Actinic keratoses 06/19/2015    Nonspecific abnormal finding in stool contents 01/26/2015      Kwasi Huang MD  Past Medical History:   Diagnosis Date    Basal cell carcinoma     excised from nose and clavicl    Cerumen impaction     Endocrine disorder     Hypertension     Menopause     age 47    Nystagmus     OA (osteoarthritis) of finger     Peripheral sensory neuropathy age 46    mild    Thyroid nodule     UTI (lower urinary tract infection)     recurrent      Past Surgical History:   Procedure Laterality Date    HX CATARACT REMOVAL Bilateral     lens implants    HX COLONOSCOPY  2008    normal    HX ORTHOPAEDIC  arthroscopic    knee    HX OTHER SURGICAL      plate right forearm    IA URETHROLSS TRVG SEC OPN W/CSTO      dilation     Allergies   Allergen Reactions    Mollusks Nausea and Vomiting    Shellfish Derived Nausea and Vomiting     Mullusks    Sulfa (Sulfonamide Antibiotics) Other (comments)    Sulfur Hives      Family History   Problem Relation Age of Onset    OSTEOARTHRITIS Father         rheumatoid    Diabetes Father         late in life    Hypertension Father     Breast Cancer Mother         dx 80    Cancer Mother         breast, age 80    Stroke Mother         when 36 yrs old    Thyroid Cancer Sister     Breast Cancer Sister         dx age 42's    Diabetes Sister     Diabetes Sister         Peripheral neuropathy    Cancer Sister         Breast, CLL      Social History     Socioeconomic History Marital status:      Spouse name: Not on file    Number of children: 3    Years of education: Not on file    Highest education level: Not on file   Occupational History    Not on file   Tobacco Use    Smoking status: Never     Passive exposure: Never    Smokeless tobacco: Never   Vaping Use    Vaping Use: Never used   Substance and Sexual Activity    Alcohol use: Yes     Alcohol/week: 18.0 standard drinks     Types: 9 Glasses of wine, 9 Standard drinks or equivalent per week     Comment: 14    Drug use: No    Sexual activity: Yes     Partners: Male     Birth control/protection: None   Other Topics Concern     Service No    Blood Transfusions No    Caffeine Concern No    Occupational Exposure No    Hobby Hazards No    Sleep Concern No    Stress Concern No    Weight Concern No    Special Diet No    Back Care No    Exercise Yes     Comment: active    Bike Helmet No    Seat Belt Yes    Self-Exams Yes   Social History Narrative    eriph     Social Determinants of Health     Financial Resource Strain: Not on file   Food Insecurity: Not on file   Transportation Needs: Not on file   Physical Activity: Not on file   Stress: Not on file   Social Connections: Not on file   Intimate Partner Violence: Not on file   Housing Stability: Not on file      Current Outpatient Medications   Medication Sig    fluticasone propionate (FLONASE) 50 mcg/actuation nasal spray     labetaloL (NORMODYNE) 200 mg tablet TAKE 1 TABLET BY MOUTH TWICE DAILY FOR HIGH BLOOD PRESSURE     No current facility-administered medications for this visit. Review of Symptoms:  11 systems reviewed, negative other than as stated in the HPI    Physical ExamPhysical Exam:    Vitals:    04/25/23 1429 04/25/23 1540 04/25/23 1542 04/25/23 1545   BP: 122/72 130/70 130/80 120/80   Pulse: 82 71 72 74   Resp: 18      SpO2: 96%      Weight: 118 lb (53.5 kg)      Height: 5' 6\" (1.676 m)        Body mass index is 19.05 kg/m².   General PE  Gen: NAD  Mental Status - Alert. General Appearance - Not in acute distress. HEENT:  PERRL, no carotid bruits or JVD  Chest and Lung Exam   Inspection: Accessory muscles - No use of accessory muscles in breathing. Auscultation:   Breath sounds: - Normal.   Cardiovascular   Inspection: Jugular vein - Bilateral - Inspection Normal.   Palpation/Percussion:   Apical Impulse: - Normal.   Auscultation: Rhythm - Regular. Heart Sounds - S1 WNL and S2 WNL. No S3 or S4. Murmurs & Other Heart Sounds: Auscultation of the heart reveals - No Murmurs. Peripheral Vascular   Upper Extremity: Inspection - Bilateral - No Cyanotic nailbeds or Digital clubbing. Lower Extremity:   Palpation: Edema - Bilateral - No edema. Abdomen:   Soft, non-tender, bowel sounds are active.   Neuro: A&O times 3, CN and motor grossly WNL    Labs:   Lab Results   Component Value Date/Time    Cholesterol, total 204 (H) 07/19/2021 11:01 AM    Cholesterol, total 169 07/19/2019 11:11 AM    Cholesterol, total 165 07/17/2018 10:00 AM    Cholesterol, total 193 07/28/2017 01:34 PM    Cholesterol, total 180 03/28/2017 02:39 PM    HDL Cholesterol 66 07/19/2021 11:01 AM    HDL Cholesterol 58 07/19/2019 11:11 AM    HDL Cholesterol 57 07/17/2018 10:00 AM    HDL Cholesterol 58 07/28/2017 01:34 PM    HDL Cholesterol 51 03/28/2017 02:39 PM    LDL, calculated 107.6 (H) 07/19/2021 11:01 AM    LDL, calculated 79 07/19/2019 11:11 AM    LDL, calculated 77 07/17/2018 10:00 AM    LDL, calculated 81 07/28/2017 01:34 PM    LDL, calculated 78 03/28/2017 02:39 PM    Triglyceride 152 (H) 07/19/2021 11:01 AM    Triglyceride 158 (H) 07/19/2019 11:11 AM    Triglyceride 156 (H) 07/17/2018 10:00 AM    Triglyceride 269 (H) 07/28/2017 01:34 PM    Triglyceride 257 (H) 03/28/2017 02:39 PM    CHOL/HDL Ratio 3.1 07/19/2021 11:01 AM     No results found for: CPK, CPKX, CPX  Lab Results   Component Value Date/Time    Sodium 140 03/14/2022 11:05 AM    Potassium 3.7 03/14/2022 11:05 AM Chloride 103 03/14/2022 11:05 AM    CO2 32 03/14/2022 11:05 AM    Anion gap 5 03/14/2022 11:05 AM    Glucose 138 (H) 03/14/2022 11:05 AM    BUN 20 03/14/2022 11:05 AM    Creatinine 0.79 03/14/2022 11:05 AM    BUN/Creatinine ratio 25 (H) 03/14/2022 11:05 AM    GFR est AA >60 03/14/2022 11:05 AM    GFR est non-AA >60 03/14/2022 11:05 AM    Calcium 9.4 03/14/2022 11:05 AM    Bilirubin, total 0.5 03/14/2022 11:05 AM    Alk. phosphatase 79 03/14/2022 11:05 AM    Protein, total 6.8 03/14/2022 11:05 AM    Albumin 3.6 03/14/2022 11:05 AM    Globulin 3.2 03/14/2022 11:05 AM    A-G Ratio 1.1 03/14/2022 11:05 AM    ALT (SGPT) 30 03/14/2022 11:05 AM       EKG:  NSR     05/23/22    ECHO STRESS 05/23/2022 5/23/2022    Interpretation Summary    Left Ventricle: Normal left ventricular systolic function. Left ventricle size is normal.    Normal stress echo    Signed by: Katja Orourke MD on 5/23/2022  4:10 PM    CT Results (most recent):  Results from [place] encounter on 05/31/22    CT HEART W/O CONT WITH CALCIUM    Narrative  EXAM: CT HEART W/O CONT WITH CALCIUM    INDICATION: Encounter for general adult medical examination without abnormal  findings    COMPARISON: None. TECHNIQUE: Unenhanced multislice helical CT of the heart was performed on a  64-slice multiple detector row CT system (AUPEO!T). Quantitative coronary artery  CT calcium scoring was performed using Cab[employer] software. No intravenous contrast was  administered. CT dose reduction was achieved through use of a standardized  protocol tailored for this examination and automatic exposure control for dose  modulation.     FINDINGS:  The coronary calcium in each vessel is as follows:    Left main coronary artery: 0  Left anterior descending coronary artery: 0  Left circumflex coronary artery: 2  Right coronary artery: 0  Posterior descending coronary artery: 0    Total calcium score: 2    Calcium score interpretation:  0-0 = No evidence of CAD  1-10 = Minimal evidence of CAD   = Mild evidence of CAD  101-400 = Moderate evidence of CAD  >400 = Extensive evidence of CAD    A score of 2 is at the 20th percentile rank. Therefore, 80% of the females aged  71-75 will have a higher calcium score. Chest CT findings:  No significant abnormality in the incidentally imaged lower  lungs. The entire lung fields are not imaged on this examination. No evidence of  mass or lymphadenopathy. The incidentally imaged portions of the upper abdominal  organs are within normal limits on this unenhanced examination. Pectus  excavatum. No aggressive osseous lesion. Impression  Total calcium score of 2. Minimal evidence of plaque. \"Significant\" coronary  artery disease is very unlikely. The result should be discussed with the  patient taking into account other risk factors such as age, gender, family  history, diabetes, smoking or high cholesterol levels. Pectus excavatum. Assessment:          ICD-10-CM ICD-9-CM    1. Essential hypertension  I10 401.9 AMB POC EKG ROUTINE W/ 12 LEADS, INTER & REP      2. Orthostatic hypotension  I95.1 458.0       3. Labile hypertension  R09.89 401.9           Orders Placed This Encounter    AMB POC EKG ROUTINE W/ 12 LEADS, INTER & REP     Order Specific Question:   Reason for Exam:     Answer:   routine    fluticasone propionate (FLONASE) 50 mcg/actuation nasal spray        Plan:     66year-old with multiple cardiac risk factors who first presented in April 2022 with intermittent chest discomfort (recently improved) and a period of labile hypertension along with orthostatic hypotension in early March that have resolved with discontinuation of losartan/HCTZ, and hydration in ER. Labile hypertension/orthostatic hypotension:  -orthostatic hypotension was due to a combination of the 3 medications noted above, now resolved and doing well on labetalol alone. Possible contribution from volume depletion which has now improved as well. -BP controlled today in office on labetalol only but she does bring recorded BPs with a wide range of blood pressures  -Counseled on proper recording of blood pressures by sitting down, resting and relaxing for 5 minutes prior to checking blood pressure  -Repeated orthostatics today 4/25/2023 which were negative    History of resolved substernal chest discomfort:  -Normal stress echo 5/23/2022  -The patient subsequently had a coronary calcium score in May 2022 of only 2, which has a very high negative predictive value for cardiovascular events  -Continue with healthy diet and exercise, management of blood pressure     Follow-up in 1 year with Dr. Sohan Roman in [place].     Mason Cabrera MD

## 2023-05-26 RX ORDER — LABETALOL 200 MG/1
TABLET, FILM COATED ORAL
COMMUNITY
Start: 2023-04-14

## 2023-05-26 RX ORDER — FLUTICASONE PROPIONATE 50 MCG
SPRAY, SUSPENSION (ML) NASAL
COMMUNITY
Start: 2023-04-21

## 2023-06-18 ENCOUNTER — TELEPHONE (OUTPATIENT)
Age: 73
End: 2023-06-18

## 2023-06-19 ENCOUNTER — OFFICE VISIT (OUTPATIENT)
Age: 73
End: 2023-06-19
Payer: MEDICARE

## 2023-06-19 VITALS
DIASTOLIC BLOOD PRESSURE: 74 MMHG | BODY MASS INDEX: 19.16 KG/M2 | HEART RATE: 56 BPM | WEIGHT: 119.2 LBS | TEMPERATURE: 98.5 F | SYSTOLIC BLOOD PRESSURE: 126 MMHG | HEIGHT: 66 IN | RESPIRATION RATE: 18 BRPM | OXYGEN SATURATION: 98 %

## 2023-06-19 DIAGNOSIS — N93.9 VAGINAL BLEEDING: Primary | ICD-10-CM

## 2023-06-19 DIAGNOSIS — R82.90 UNSPECIFIED ABNORMAL FINDINGS IN URINE: ICD-10-CM

## 2023-06-19 LAB
BILIRUBIN, URINE, POC: NEGATIVE
BLOOD URINE, POC: ABNORMAL
GLUCOSE URINE, POC: NEGATIVE
KETONES, URINE, POC: NEGATIVE
LEUKOCYTE ESTERASE, URINE, POC: ABNORMAL
NITRITE, URINE, POC: NEGATIVE
PH, URINE, POC: 7 (ref 4.6–8)
PROTEIN,URINE, POC: NEGATIVE
SPECIFIC GRAVITY, URINE, POC: 1.01 (ref 1–1.03)
URINALYSIS CLARITY, POC: CLEAR
URINALYSIS COLOR, POC: YELLOW
UROBILINOGEN, POC: ABNORMAL

## 2023-06-19 PROCEDURE — G8400 PT W/DXA NO RESULTS DOC: HCPCS | Performed by: NURSE PRACTITIONER

## 2023-06-19 PROCEDURE — 3078F DIAST BP <80 MM HG: CPT | Performed by: NURSE PRACTITIONER

## 2023-06-19 PROCEDURE — 99213 OFFICE O/P EST LOW 20 MIN: CPT | Performed by: NURSE PRACTITIONER

## 2023-06-19 PROCEDURE — 3074F SYST BP LT 130 MM HG: CPT | Performed by: NURSE PRACTITIONER

## 2023-06-19 PROCEDURE — 1036F TOBACCO NON-USER: CPT | Performed by: NURSE PRACTITIONER

## 2023-06-19 PROCEDURE — 3017F COLORECTAL CA SCREEN DOC REV: CPT | Performed by: NURSE PRACTITIONER

## 2023-06-19 PROCEDURE — 1090F PRES/ABSN URINE INCON ASSESS: CPT | Performed by: NURSE PRACTITIONER

## 2023-06-19 PROCEDURE — G8420 CALC BMI NORM PARAMETERS: HCPCS | Performed by: NURSE PRACTITIONER

## 2023-06-19 PROCEDURE — 1123F ACP DISCUSS/DSCN MKR DOCD: CPT | Performed by: NURSE PRACTITIONER

## 2023-06-19 PROCEDURE — 81002 URINALYSIS NONAUTO W/O SCOPE: CPT | Performed by: NURSE PRACTITIONER

## 2023-06-19 PROCEDURE — G8427 DOCREV CUR MEDS BY ELIG CLIN: HCPCS | Performed by: NURSE PRACTITIONER

## 2023-06-19 RX ORDER — NITROFURANTOIN 25; 75 MG/1; MG/1
100 CAPSULE ORAL 2 TIMES DAILY
Qty: 20 CAPSULE | Refills: 0 | Status: SHIPPED | OUTPATIENT
Start: 2023-06-19 | End: 2023-06-29

## 2023-06-19 NOTE — PROGRESS NOTES
Chief Complaint   Patient presents with    Vaginal Bleeding     States she saw some blood on the toilet paper yesterday after urinating . . denies any pain w/ urination but did have a small twinge in lower back          HPI:       is a 75 y.o. female. New Issues:  She is here for an acute visit. She is complaining of blood with wiping. She is not sure where it is coming from exactly. No frequency or burning. No history of gynecologic issues. Has not had a UTI since she was a teenager, but she feels that there may have been some recent cross contamination of stool. She has a remote history of C-diff 5+ years ago. No smoking history. Allergies   Allergen Reactions    Sulfa Antibiotics Other (See Comments)    Clam Shell Nausea And Vomiting    Shellfish Allergy Nausea And Vomiting     Mullusks    Sulfur Hives       Current Outpatient Medications   Medication Sig Dispense Refill    fluticasone (FLONASE) 50 MCG/ACT nasal spray ceived the following from [facility]: Outside name: fluticasone propionate (FLONASE) 50 mcg/actuation nasal spray      labetalol (NORMODYNE) 200 MG tablet TAKE 1 TABLET BY MOUTH TWICE DAILY FOR HIGH BLOOD PRESSURE       No current facility-administered medications for this visit.         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
or showering No Help Needed   Walk across the room (includes cane/walker) No Help Needed   Using the telphone No Help Needed   Taking your medications No Help Needed   Preparing meals No Help Needed   Managing money (expenses/bills) No Help Needed   Moderately strenuous housework (laundry) No Help Needed   Shopping for personal items (toiletries/medicines) No Help Needed   Shopping for groceries No Help Needed   Driving No Help Needed   Climbing a flight of stairs No Help Needed   Getting to places beyond walking distances No Help Needed       AMB Abuse Screening 6/19/2023   Do you ever feel afraid of your partner? N   Are you in a relationship with someone who physically or mentally threatens you? N   Is it safe for you to go home?  Y       Advance Care Planning     The patient has appointed the following active healthcare agents:    Primary Decision Maker: Bruce Del Cid - Other - [phone]    Primary Decision Maker: Karoline Flores - Child - [phone]    Primary Decision Maker: VELIAYGYVBARRETT - Child - [phone]    Secondary Decision Maker: Broderick Del Cid - Spouse - [phone]

## 2023-06-21 LAB
BACTERIA SPEC CULT: NORMAL
SERVICE CMNT-IMP: NORMAL

## 2023-06-22 ENCOUNTER — HOSPITAL ENCOUNTER (OUTPATIENT)
Facility: HOSPITAL | Age: 73
Discharge: HOME | End: 2023-06-22
Attending: OBSTETRICS & GYNECOLOGY
Payer: MEDICARE

## 2023-06-22 ENCOUNTER — TRANSCRIBE ORDERS (OUTPATIENT)
Facility: HOSPITAL | Age: 73
End: 2023-06-22

## 2023-06-22 DIAGNOSIS — N95.0 POSTMENOPAUSAL BLEEDING: Primary | ICD-10-CM

## 2023-06-22 DIAGNOSIS — N95.0 POSTMENOPAUSAL BLEEDING: ICD-10-CM

## 2023-06-22 PROCEDURE — 76830 TRANSVAGINAL US NON-OB: CPT

## 2023-06-23 ENCOUNTER — TELEPHONE (OUTPATIENT)
Age: 73
End: 2023-06-23

## 2023-06-23 NOTE — TELEPHONE ENCOUNTER
U/S ordered by GYN. Strongly suggested to call patient to call the GYN and go over results with that provider. Patient verbalized understanding.

## 2023-07-06 ENCOUNTER — HOSPITAL ENCOUNTER (OUTPATIENT)
Facility: HOSPITAL | Age: 73
Discharge: HOME | End: 2023-07-09

## 2023-07-29 ENCOUNTER — HOSPITAL ENCOUNTER (EMERGENCY)
Facility: HOSPITAL | Age: 73
Discharge: HOME | End: 2023-07-29
Attending: EMERGENCY MEDICINE
Payer: MEDICARE

## 2023-07-29 ENCOUNTER — APPOINTMENT (OUTPATIENT)
Facility: HOSPITAL | Age: 73
End: 2023-07-29
Payer: MEDICARE

## 2023-07-29 VITALS
WEIGHT: 118 LBS | HEART RATE: 70 BPM | BODY MASS INDEX: 19.66 KG/M2 | RESPIRATION RATE: 16 BRPM | TEMPERATURE: 98 F | OXYGEN SATURATION: 98 % | HEIGHT: 65 IN | SYSTOLIC BLOOD PRESSURE: 155 MMHG | DIASTOLIC BLOOD PRESSURE: 90 MMHG

## 2023-07-29 DIAGNOSIS — M54.6 ACUTE LEFT-SIDED THORACIC BACK PAIN: Primary | ICD-10-CM

## 2023-07-29 LAB
ALBUMIN SERPL-MCNC: 3.9 G/DL (ref 3.5–5)
ALBUMIN/GLOB SERPL: 1.4 (ref 1.1–2.2)
ALP SERPL-CCNC: 101 U/L (ref 45–117)
ALT SERPL-CCNC: 27 U/L (ref 12–78)
ANION GAP SERPL CALC-SCNC: 7 MMOL/L (ref 5–15)
AST SERPL-CCNC: 18 U/L (ref 15–37)
BASOPHILS # BLD: 0 K/UL (ref 0–0.1)
BASOPHILS NFR BLD: 1 % (ref 0–1)
BILIRUB SERPL-MCNC: 0.4 MG/DL (ref 0.2–1)
BUN SERPL-MCNC: 19 MG/DL (ref 6–20)
BUN/CREAT SERPL: 29 (ref 12–20)
CALCIUM SERPL-MCNC: 8.8 MG/DL (ref 8.5–10.1)
CHLORIDE SERPL-SCNC: 105 MMOL/L (ref 97–108)
CO2 SERPL-SCNC: 30 MMOL/L (ref 21–32)
CREAT SERPL-MCNC: 0.66 MG/DL (ref 0.55–1.02)
DIFFERENTIAL METHOD BLD: ABNORMAL
EOSINOPHIL # BLD: 0.1 K/UL (ref 0–0.4)
EOSINOPHIL NFR BLD: 2 % (ref 0–7)
ERYTHROCYTE [DISTWIDTH] IN BLOOD BY AUTOMATED COUNT: 13.2 % (ref 11.5–14.5)
GLOBULIN SER CALC-MCNC: 2.8 G/DL (ref 2–4)
GLUCOSE SERPL-MCNC: 108 MG/DL (ref 65–100)
HCT VFR BLD AUTO: 36.3 % (ref 35–47)
HGB BLD-MCNC: 12 G/DL (ref 11.5–16)
IMM GRANULOCYTES # BLD AUTO: 0 K/UL (ref 0–0.04)
IMM GRANULOCYTES NFR BLD AUTO: 0 % (ref 0–0.5)
LIPASE SERPL-CCNC: 118 U/L (ref 73–393)
LYMPHOCYTES # BLD: 2.1 K/UL (ref 0.8–3.5)
LYMPHOCYTES NFR BLD: 35 % (ref 12–49)
MCH RBC QN AUTO: 32.3 PG (ref 26–34)
MCHC RBC AUTO-ENTMCNC: 33.1 G/DL (ref 30–36.5)
MCV RBC AUTO: 97.6 FL (ref 80–99)
MONOCYTES # BLD: 0.4 K/UL (ref 0–1)
MONOCYTES NFR BLD: 7 % (ref 5–13)
NEUTS SEG # BLD: 3.3 K/UL (ref 1.8–8)
NEUTS SEG NFR BLD: 55 % (ref 32–75)
NRBC # BLD: 0 K/UL (ref 0–0.01)
NRBC BLD-RTO: 0 PER 100 WBC
PLATELET # BLD AUTO: 191 K/UL (ref 150–400)
PMV BLD AUTO: 11.4 FL (ref 8.9–12.9)
POTASSIUM SERPL-SCNC: 3.6 MMOL/L (ref 3.5–5.1)
PROT SERPL-MCNC: 6.7 G/DL (ref 6.4–8.2)
RBC # BLD AUTO: 3.72 M/UL (ref 3.8–5.2)
SODIUM SERPL-SCNC: 142 MMOL/L (ref 136–145)
TROPONIN I SERPL HS-MCNC: 6 NG/L (ref 0–51)
WBC # BLD AUTO: 5.9 K/UL (ref 3.6–11)

## 2023-07-29 PROCEDURE — 85025 COMPLETE CBC W/AUTO DIFF WBC: CPT

## 2023-07-29 PROCEDURE — 36415 COLL VENOUS BLD VENIPUNCTURE: CPT

## 2023-07-29 PROCEDURE — 71046 X-RAY EXAM CHEST 2 VIEWS: CPT

## 2023-07-29 PROCEDURE — 71275 CT ANGIOGRAPHY CHEST: CPT

## 2023-07-29 PROCEDURE — 6360000004 HC RX CONTRAST MEDICATION: Performed by: EMERGENCY MEDICINE

## 2023-07-29 PROCEDURE — 99285 EMERGENCY DEPT VISIT HI MDM: CPT

## 2023-07-29 PROCEDURE — 93005 ELECTROCARDIOGRAM TRACING: CPT | Performed by: EMERGENCY MEDICINE

## 2023-07-29 PROCEDURE — 83690 ASSAY OF LIPASE: CPT

## 2023-07-29 PROCEDURE — 84484 ASSAY OF TROPONIN QUANT: CPT

## 2023-07-29 PROCEDURE — 80053 COMPREHEN METABOLIC PANEL: CPT

## 2023-07-29 RX ADMIN — IOPAMIDOL 100 ML: 755 INJECTION, SOLUTION INTRAVENOUS at 16:20

## 2023-07-29 ASSESSMENT — PAIN - FUNCTIONAL ASSESSMENT: PAIN_FUNCTIONAL_ASSESSMENT: NONE - DENIES PAIN

## 2023-07-29 ASSESSMENT — LIFESTYLE VARIABLES
HOW MANY STANDARD DRINKS CONTAINING ALCOHOL DO YOU HAVE ON A TYPICAL DAY: PATIENT DOES NOT DRINK
HOW OFTEN DO YOU HAVE A DRINK CONTAINING ALCOHOL: NEVER

## 2023-07-29 NOTE — ED NOTES
Pt transported to CT scan with [facility] Tech by Wheelchair at this time.       Kumar Martinez RN  [phone]

## 2023-07-30 LAB
EKG ATRIAL RATE: 66 BPM
EKG DIAGNOSIS: NORMAL
EKG P AXIS: 36 DEGREES
EKG P-R INTERVAL: 188 MS
EKG Q-T INTERVAL: 436 MS
EKG QRS DURATION: 72 MS
EKG QTC CALCULATION (BAZETT): 457 MS
EKG R AXIS: -4 DEGREES
EKG T AXIS: 61 DEGREES
EKG VENTRICULAR RATE: 66 BPM

## 2023-07-30 NOTE — ED PROVIDER NOTES
Reassessment: 71-year-old female with left upper back pain. Concern for PE, aortic dissection, musculoskeletal etiology. Less likely acute coronary syndrome although possible patient has atypical symptoms. No zoster on exam.  Back muscles not tender, no skin changes. Unclear etiology of pain. We will plan for lab work, chest x-ray. Likely CTA. Reassessment: No PE, pneumonia, aortic dissection, other specific cause noted on CT scan or chest x-ray as cause of symptoms. Troponin is 6 after several days of symptoms, exceedingly low suspicion for acute coronary syndrome. Patient has an uncharacterized mass on kidney, location of pain is not close to kidney. Will recommend PCP follow-up for nonemergent ultrasound. We discussed return to ED precautions, plan for discharge home. FINAL IMPRESSION     1. Acute left-sided thoracic back pain          DISPOSITION/PLAN   DISPOSITION Decision To Discharge 07/29/2023 05:13:35 PM      Discharge Note: The patient is stable for discharge home. The signs, symptoms, diagnosis, and discharge instructions have been discussed, understanding conveyed, and agreed upon. The patient is to follow up as recommended or return to ER should their symptoms worsen. PATIENT REFERRED TO:  Carie Morejon MD  [address] [place] [address]  [phone]    Schedule an appointment as soon as possible for a visit            DISCHARGE MEDICATIONS:     Medication List        ASK your doctor about these medications      fluticasone 50 MCG/ACT nasal spray  Commonly known as: FLONASE     labetalol 200 MG tablet  Commonly known as: Leslie Fran                DISCONTINUED MEDICATIONS:  Discharge Medication List as of 7/29/2023  5:14 PM          I am the Primary Clinician of Record. Rubia Green MD (electronically signed)      (Please note that parts of this dictation were completed with voice recognition software.  Quite often unanticipated grammatical, syntax, homophones, and

## 2023-07-31 ENCOUNTER — OFFICE VISIT (OUTPATIENT)
Age: 73
End: 2023-07-31
Payer: MEDICARE

## 2023-07-31 ENCOUNTER — HOSPITAL ENCOUNTER (OUTPATIENT)
Facility: HOSPITAL | Age: 73
Discharge: HOME | End: 2023-08-03
Payer: MEDICARE

## 2023-07-31 VITALS
BODY MASS INDEX: 20.03 KG/M2 | HEART RATE: 72 BPM | OXYGEN SATURATION: 98 % | SYSTOLIC BLOOD PRESSURE: 124 MMHG | WEIGHT: 120.2 LBS | RESPIRATION RATE: 18 BRPM | DIASTOLIC BLOOD PRESSURE: 62 MMHG | HEIGHT: 65 IN

## 2023-07-31 DIAGNOSIS — R93.5 ABNORMAL ABDOMINAL CT SCAN: Primary | ICD-10-CM

## 2023-07-31 DIAGNOSIS — M54.50 CHRONIC MIDLINE LOW BACK PAIN WITHOUT SCIATICA: ICD-10-CM

## 2023-07-31 DIAGNOSIS — G89.29 CHRONIC MIDLINE LOW BACK PAIN WITHOUT SCIATICA: ICD-10-CM

## 2023-07-31 LAB
BILIRUBIN, URINE, POC: NEGATIVE
BLOOD URINE, POC: NEGATIVE
GLUCOSE URINE, POC: NEGATIVE
KETONES, URINE, POC: NEGATIVE
LEUKOCYTE ESTERASE, URINE, POC: NORMAL
NITRITE, URINE, POC: NEGATIVE
PH, URINE, POC: 7 (ref 4.6–8)
PROTEIN,URINE, POC: NEGATIVE
SPECIFIC GRAVITY, URINE, POC: 1.01 (ref 1–1.03)
URINALYSIS CLARITY, POC: CLEAR
URINALYSIS COLOR, POC: YELLOW
UROBILINOGEN, POC: NORMAL

## 2023-07-31 PROCEDURE — 99214 OFFICE O/P EST MOD 30 MIN: CPT | Performed by: INTERNAL MEDICINE

## 2023-07-31 PROCEDURE — G8400 PT W/DXA NO RESULTS DOC: HCPCS | Performed by: INTERNAL MEDICINE

## 2023-07-31 PROCEDURE — G8427 DOCREV CUR MEDS BY ELIG CLIN: HCPCS | Performed by: INTERNAL MEDICINE

## 2023-07-31 PROCEDURE — 1123F ACP DISCUSS/DSCN MKR DOCD: CPT | Performed by: INTERNAL MEDICINE

## 2023-07-31 PROCEDURE — 1090F PRES/ABSN URINE INCON ASSESS: CPT | Performed by: INTERNAL MEDICINE

## 2023-07-31 PROCEDURE — G8420 CALC BMI NORM PARAMETERS: HCPCS | Performed by: INTERNAL MEDICINE

## 2023-07-31 PROCEDURE — 3074F SYST BP LT 130 MM HG: CPT | Performed by: INTERNAL MEDICINE

## 2023-07-31 PROCEDURE — 72072 X-RAY EXAM THORAC SPINE 3VWS: CPT

## 2023-07-31 PROCEDURE — 1036F TOBACCO NON-USER: CPT | Performed by: INTERNAL MEDICINE

## 2023-07-31 PROCEDURE — 3078F DIAST BP <80 MM HG: CPT | Performed by: INTERNAL MEDICINE

## 2023-07-31 PROCEDURE — 81003 URINALYSIS AUTO W/O SCOPE: CPT | Performed by: INTERNAL MEDICINE

## 2023-07-31 PROCEDURE — 3017F COLORECTAL CA SCREEN DOC REV: CPT | Performed by: INTERNAL MEDICINE

## 2023-07-31 RX ORDER — TERBINAFINE HYDROCHLORIDE 250 MG/1
TABLET ORAL
COMMUNITY
Start: 2023-07-18

## 2023-07-31 RX ORDER — ESTRADIOL 0.1 MG/G
CREAM VAGINAL
COMMUNITY
Start: 2023-07-08

## 2023-07-31 ASSESSMENT — PATIENT HEALTH QUESTIONNAIRE - PHQ9
2. FEELING DOWN, DEPRESSED OR HOPELESS: 0
SUM OF ALL RESPONSES TO PHQ QUESTIONS 1-9: 0
1. LITTLE INTEREST OR PLEASURE IN DOING THINGS: 0
SUM OF ALL RESPONSES TO PHQ9 QUESTIONS 1 & 2: 0
SUM OF ALL RESPONSES TO PHQ QUESTIONS 1-9: 0

## 2023-07-31 NOTE — PROGRESS NOTES
Duration-30 minutes primarily counseling and education in addition to reviewing prior notes and laboratory tests as well as relevant imaging results. Assessment and Plan:    1. Abnormal abdominal CT scan  She has a left renal lesion which was incidentally noted during her CT pulmonary angiogram 2 days ago. We will obtain a dedicated CT scan of the abdomen pelvis with and without contrast along with a urinalysis today to exclude stone in addition to a ureteral or renal mass. - AMB POC URINALYSIS DIP STICK AUTO W/O MICRO  - CT ABDOMEN W WO AND PELVIS W CONTRAST; Future    2. Chronic midline low back pain without sciatica  Hopefully this is a musculoskeletal phenomenon and several features of her history suggest this but with her other issues including prior hematuria and CT abnormalities from the ER we cannot completely exclude other concerns however physical therapy will be a help. I also like to obtain plain films of her thoracic spine to exclude degenerative disc disease  - AMB POC URINALYSIS DIP STICK AUTO W/O MICRO  - CT ABDOMEN W WO AND PELVIS W CONTRAST; Future  - XR THORACIC SPINE (3 VIEWS); Future  - Amb External Referral To Physical Therapy         Ms. Del Cid is a 75 y.o. female who is here for evaluation of   Chief Complaint   Patient presents with    Shoulder Pain     States is \"constantly inconsistent\". Did not take tylenol today. States pain is from (L) shoulder blade to front of abdominal wall. Was seen in ER this week. Abnormal Test Results     Has copy of Vaginal U/S from Dr Kelly Carl. Wants to review ER notes from this weekend   .              Orders Placed This Encounter   Procedures    CT ABDOMEN W WO AND PELVIS W CONTRAST     Standing Status:   Future     Standing Expiration Date:   8/31/2023     Order Specific Question:   Additional Contrast?     Answer:   Radiologist Recommendation     Order Specific Question:   STAT Creatinine as needed:     Answer:   Yes     Order Specific Question:
Help Needed No Help Needed   Getting from bed to chair No Help Needed No Help Needed   Getting dressed No Help Needed No Help Needed   Bathing or showering No Help Needed No Help Needed   Walk across the room (includes cane/walker) No Help Needed No Help Needed   Using the telphone No Help Needed No Help Needed   Taking your medications No Help Needed No Help Needed   Preparing meals No Help Needed No Help Needed   Managing money (expenses/bills) No Help Needed No Help Needed   Moderately strenuous housework (laundry) No Help Needed No Help Needed   Shopping for personal items (toiletries/medicines) No Help Needed No Help Needed   Shopping for groceries No Help Needed No Help Needed   Driving No Help Needed No Help Needed   Climbing a flight of stairs No Help Needed No Help Needed   Getting to places beyond walking distances No Help Needed No Help Needed       AMB Abuse Screening 7/31/2023   Do you ever feel afraid of your partner? N   Are you in a relationship with someone who physically or mentally threatens you? N   Is it safe for you to go home?  Y       Advance Care Planning     The patient has appointed the following active healthcare agents:    Primary Decision Maker: Bruce Del Cid - Other - [phone]    Primary Decision Maker: Tanna Jones - Child - [phone]    Primary Decision Maker: SHANDRA WARNER - Child - [phone]    Secondary Decision Maker: Broderick Del Cid - Spouse - [phone]

## 2023-08-01 ENCOUNTER — HOSPITAL ENCOUNTER (OUTPATIENT)
Facility: HOSPITAL | Age: 73
Discharge: HOME | End: 2023-08-04
Attending: INTERNAL MEDICINE
Payer: MEDICARE

## 2023-08-01 DIAGNOSIS — R93.5 ABNORMAL ABDOMINAL CT SCAN: ICD-10-CM

## 2023-08-01 DIAGNOSIS — M54.50 CHRONIC MIDLINE LOW BACK PAIN WITHOUT SCIATICA: ICD-10-CM

## 2023-08-01 DIAGNOSIS — G89.29 CHRONIC MIDLINE LOW BACK PAIN WITHOUT SCIATICA: ICD-10-CM

## 2023-08-01 PROCEDURE — 6360000004 HC RX CONTRAST MEDICATION: Performed by: INTERNAL MEDICINE

## 2023-08-01 PROCEDURE — 74177 CT ABD & PELVIS W/CONTRAST: CPT

## 2023-08-01 RX ADMIN — IOPAMIDOL 100 ML: 612 INJECTION, SOLUTION INTRAVENOUS at 09:13

## 2023-08-08 SDOH — HEALTH STABILITY: PHYSICAL HEALTH: ON AVERAGE, HOW MANY DAYS PER WEEK DO YOU ENGAGE IN MODERATE TO STRENUOUS EXERCISE (LIKE A BRISK WALK)?: 2 DAYS

## 2023-08-08 SDOH — HEALTH STABILITY: PHYSICAL HEALTH: ON AVERAGE, HOW MANY MINUTES DO YOU ENGAGE IN EXERCISE AT THIS LEVEL?: 80 MIN

## 2023-08-08 ASSESSMENT — LIFESTYLE VARIABLES
HOW OFTEN DO YOU HAVE A DRINK CONTAINING ALCOHOL: 5
HAVE YOU OR SOMEONE ELSE BEEN INJURED AS A RESULT OF YOUR DRINKING: NO
HOW OFTEN DO YOU HAVE SIX OR MORE DRINKS ON ONE OCCASION: 1
HOW OFTEN DURING THE LAST YEAR HAVE YOU NEEDED AN ALCOHOLIC DRINK FIRST THING IN THE MORNING TO GET YOURSELF GOING AFTER A NIGHT OF HEAVY DRINKING: 0
HOW OFTEN DURING THE LAST YEAR HAVE YOU NEEDED AN ALCOHOLIC DRINK FIRST THING IN THE MORNING TO GET YOURSELF GOING AFTER A NIGHT OF HEAVY DRINKING: NEVER
HOW OFTEN DURING THE LAST YEAR HAVE YOU BEEN UNABLE TO REMEMBER WHAT HAPPENED THE NIGHT BEFORE BECAUSE YOU HAD BEEN DRINKING: 0
HOW MANY STANDARD DRINKS CONTAINING ALCOHOL DO YOU HAVE ON A TYPICAL DAY: 1 OR 2
HOW OFTEN DO YOU HAVE A DRINK CONTAINING ALCOHOL: 4 OR MORE TIMES A WEEK
HOW OFTEN DURING THE LAST YEAR HAVE YOU FAILED TO DO WHAT WAS NORMALLY EXPECTED FROM YOU BECAUSE OF DRINKING: NEVER
HOW OFTEN DURING THE LAST YEAR HAVE YOU BEEN UNABLE TO REMEMBER WHAT HAPPENED THE NIGHT BEFORE BECAUSE YOU HAD BEEN DRINKING: NEVER
HAS A RELATIVE, FRIEND, DOCTOR, OR ANOTHER HEALTH PROFESSIONAL EXPRESSED CONCERN ABOUT YOUR DRINKING OR SUGGESTED YOU CUT DOWN: NO
HOW OFTEN DURING THE LAST YEAR HAVE YOU FAILED TO DO WHAT WAS NORMALLY EXPECTED FROM YOU BECAUSE OF DRINKING: 0
HOW OFTEN DURING THE LAST YEAR HAVE YOU HAD A FEELING OF GUILT OR REMORSE AFTER DRINKING: 0
HOW OFTEN DURING THE LAST YEAR HAVE YOU HAD A FEELING OF GUILT OR REMORSE AFTER DRINKING: NEVER
HAVE YOU OR SOMEONE ELSE BEEN INJURED AS A RESULT OF YOUR DRINKING: 0
HOW OFTEN DURING THE LAST YEAR HAVE YOU FOUND THAT YOU WERE NOT ABLE TO STOP DRINKING ONCE YOU HAD STARTED: NEVER
HOW OFTEN DURING THE LAST YEAR HAVE YOU FOUND THAT YOU WERE NOT ABLE TO STOP DRINKING ONCE YOU HAD STARTED: 0
HOW MANY STANDARD DRINKS CONTAINING ALCOHOL DO YOU HAVE ON A TYPICAL DAY: 1
HAS A RELATIVE, FRIEND, DOCTOR, OR ANOTHER HEALTH PROFESSIONAL EXPRESSED CONCERN ABOUT YOUR DRINKING OR SUGGESTED YOU CUT DOWN: 0

## 2023-08-08 ASSESSMENT — PATIENT HEALTH QUESTIONNAIRE - PHQ9
SUM OF ALL RESPONSES TO PHQ9 QUESTIONS 1 & 2: 0
1. LITTLE INTEREST OR PLEASURE IN DOING THINGS: 0
SUM OF ALL RESPONSES TO PHQ QUESTIONS 1-9: 0
SUM OF ALL RESPONSES TO PHQ QUESTIONS 1-9: 0
2. FEELING DOWN, DEPRESSED OR HOPELESS: 0
SUM OF ALL RESPONSES TO PHQ QUESTIONS 1-9: 0
SUM OF ALL RESPONSES TO PHQ QUESTIONS 1-9: 0

## 2023-08-10 ENCOUNTER — OFFICE VISIT (OUTPATIENT)
Age: 73
End: 2023-08-10
Payer: MEDICARE

## 2023-08-10 VITALS
OXYGEN SATURATION: 100 % | WEIGHT: 121.6 LBS | SYSTOLIC BLOOD PRESSURE: 125 MMHG | RESPIRATION RATE: 18 BRPM | HEART RATE: 71 BPM | HEIGHT: 65 IN | TEMPERATURE: 98 F | DIASTOLIC BLOOD PRESSURE: 68 MMHG | BODY MASS INDEX: 20.26 KG/M2

## 2023-08-10 DIAGNOSIS — I10 PRIMARY HYPERTENSION: ICD-10-CM

## 2023-08-10 DIAGNOSIS — M54.6 PAIN IN THORACIC SPINE: ICD-10-CM

## 2023-08-10 DIAGNOSIS — Z00.00 MEDICARE ANNUAL WELLNESS VISIT, SUBSEQUENT: Primary | ICD-10-CM

## 2023-08-10 PROCEDURE — 99213 OFFICE O/P EST LOW 20 MIN: CPT | Performed by: INTERNAL MEDICINE

## 2023-08-10 PROCEDURE — 3078F DIAST BP <80 MM HG: CPT | Performed by: INTERNAL MEDICINE

## 2023-08-10 PROCEDURE — G8427 DOCREV CUR MEDS BY ELIG CLIN: HCPCS | Performed by: INTERNAL MEDICINE

## 2023-08-10 PROCEDURE — G8420 CALC BMI NORM PARAMETERS: HCPCS | Performed by: INTERNAL MEDICINE

## 2023-08-10 PROCEDURE — 3074F SYST BP LT 130 MM HG: CPT | Performed by: INTERNAL MEDICINE

## 2023-08-10 PROCEDURE — 1090F PRES/ABSN URINE INCON ASSESS: CPT | Performed by: INTERNAL MEDICINE

## 2023-08-10 PROCEDURE — G8400 PT W/DXA NO RESULTS DOC: HCPCS | Performed by: INTERNAL MEDICINE

## 2023-08-10 PROCEDURE — 3017F COLORECTAL CA SCREEN DOC REV: CPT | Performed by: INTERNAL MEDICINE

## 2023-08-10 PROCEDURE — 1123F ACP DISCUSS/DSCN MKR DOCD: CPT | Performed by: INTERNAL MEDICINE

## 2023-08-10 PROCEDURE — G0439 PPPS, SUBSEQ VISIT: HCPCS | Performed by: INTERNAL MEDICINE

## 2023-08-10 PROCEDURE — 1036F TOBACCO NON-USER: CPT | Performed by: INTERNAL MEDICINE

## 2023-08-10 NOTE — PROGRESS NOTES
1. Medicare annual wellness visit, subsequent  2. Pain in thoracic spine  Conservative measures. 3. Primary hypertension  At goal         Chief Complaint   Patient presents with    Medicare AWV         No orders of the defined types were placed in this encounter. Boogie Zafar MD, FACP      HPI:         is a 75 y.o. female who arrives for subsequent annual wellness visit and for interval assessment of recent studies including CT scan of the abdomen and pelvis and thoracic spine films which documented a cyst in the kidney on the left and degenerative disc disease. Allergies   Allergen Reactions    Sulfa Antibiotics Other (See Comments) and Hives    Clam Shell Nausea And Vomiting    Shellfish Allergy Nausea And Vomiting     Mullusks    Sulfur Hives       Outpatient Encounter Medications as of 8/10/2023   Medication Sig Dispense Refill    fluticasone (FLONASE) 50 MCG/ACT nasal spray ceived the following from [facility]: Outside name: fluticasone propionate (FLONASE) 50 mcg/actuation nasal spray      labetalol (NORMODYNE) 200 MG tablet TAKE 1 TABLET BY MOUTH TWICE DAILY FOR HIGH BLOOD PRESSURE      estradiol (ESTRACE) 0.1 MG/GM vaginal cream  (Patient not taking: Reported on 8/10/2023)      terbinafine (LAMISIL) 250 MG tablet  (Patient not taking: Reported on 8/10/2023)       No facility-administered encounter medications on file as of 8/10/2023. Past Medical History:   Diagnosis Date    Basal cell carcinoma     excised from nose and clavicl    Cerumen impaction     Endocrine disorder     Hypertension     Menopause     age 47    Nystagmus     OA (osteoarthritis) of finger     Peripheral sensory neuropathy age 46    mild    Thyroid nodule     UTI (lower urinary tract infection)     recurrent         ROS:  Denies fever, chills, cough, chest pain, SOB,  nausea, vomiting, or diarrhea. Denies wt loss, wt gain, hemoptysis, hematochezia or melena.     Physical Examination:    BP

## 2023-09-12 ENCOUNTER — TELEPHONE (OUTPATIENT)
Age: 73
End: 2023-09-12

## 2023-10-23 ENCOUNTER — HOSPITAL ENCOUNTER (OUTPATIENT)
Facility: HOSPITAL | Age: 73
Discharge: HOME | End: 2023-10-26
Attending: INTERNAL MEDICINE
Payer: MEDICARE

## 2023-10-23 DIAGNOSIS — M81.0 AGE-RELATED OSTEOPOROSIS WITHOUT CURRENT PATHOLOGICAL FRACTURE: ICD-10-CM

## 2023-10-23 PROCEDURE — 77080 DXA BONE DENSITY AXIAL: CPT

## 2023-11-01 ENCOUNTER — TRANSCRIBE ORDERS (OUTPATIENT)
Facility: HOSPITAL | Age: 73
End: 2023-11-01

## 2023-11-01 DIAGNOSIS — Z12.31 SCREENING MAMMOGRAM FOR BREAST CANCER: Primary | ICD-10-CM

## 2023-11-06 ENCOUNTER — OFFICE VISIT (OUTPATIENT)
Age: 73
End: 2023-11-06
Payer: MEDICARE

## 2023-11-06 VITALS
OXYGEN SATURATION: 98 % | RESPIRATION RATE: 18 BRPM | DIASTOLIC BLOOD PRESSURE: 62 MMHG | SYSTOLIC BLOOD PRESSURE: 106 MMHG | HEIGHT: 65 IN | BODY MASS INDEX: 19.96 KG/M2 | WEIGHT: 119.8 LBS | HEART RATE: 75 BPM

## 2023-11-06 DIAGNOSIS — M81.0 AGE-RELATED OSTEOPOROSIS WITHOUT CURRENT PATHOLOGICAL FRACTURE: Primary | ICD-10-CM

## 2023-11-06 PROCEDURE — G8427 DOCREV CUR MEDS BY ELIG CLIN: HCPCS | Performed by: INTERNAL MEDICINE

## 2023-11-06 PROCEDURE — G8420 CALC BMI NORM PARAMETERS: HCPCS | Performed by: INTERNAL MEDICINE

## 2023-11-06 PROCEDURE — 3078F DIAST BP <80 MM HG: CPT | Performed by: INTERNAL MEDICINE

## 2023-11-06 PROCEDURE — G8399 PT W/DXA RESULTS DOCUMENT: HCPCS | Performed by: INTERNAL MEDICINE

## 2023-11-06 PROCEDURE — 3017F COLORECTAL CA SCREEN DOC REV: CPT | Performed by: INTERNAL MEDICINE

## 2023-11-06 PROCEDURE — 1123F ACP DISCUSS/DSCN MKR DOCD: CPT | Performed by: INTERNAL MEDICINE

## 2023-11-06 PROCEDURE — 1036F TOBACCO NON-USER: CPT | Performed by: INTERNAL MEDICINE

## 2023-11-06 PROCEDURE — G8484 FLU IMMUNIZE NO ADMIN: HCPCS | Performed by: INTERNAL MEDICINE

## 2023-11-06 PROCEDURE — 99213 OFFICE O/P EST LOW 20 MIN: CPT | Performed by: INTERNAL MEDICINE

## 2023-11-06 PROCEDURE — 1090F PRES/ABSN URINE INCON ASSESS: CPT | Performed by: INTERNAL MEDICINE

## 2023-11-06 PROCEDURE — 3074F SYST BP LT 130 MM HG: CPT | Performed by: INTERNAL MEDICINE

## 2023-11-06 RX ORDER — TERIPARATIDE 250 UG/ML
20 INJECTION, SOLUTION SUBCUTANEOUS DAILY
Qty: 2.48 ML | Refills: 11 | Status: SHIPPED | OUTPATIENT
Start: 2023-11-06

## 2023-11-06 ASSESSMENT — PATIENT HEALTH QUESTIONNAIRE - PHQ9
SUM OF ALL RESPONSES TO PHQ QUESTIONS 1-9: 0
2. FEELING DOWN, DEPRESSED OR HOPELESS: 0
SUM OF ALL RESPONSES TO PHQ9 QUESTIONS 1 & 2: 0
1. LITTLE INTEREST OR PLEASURE IN DOING THINGS: 0

## 2023-11-20 ENCOUNTER — HOSPITAL ENCOUNTER (OUTPATIENT)
Facility: HOSPITAL | Age: 73
Discharge: HOME | End: 2023-11-23
Attending: INTERNAL MEDICINE
Payer: MEDICARE

## 2023-11-20 DIAGNOSIS — Z12.31 SCREENING MAMMOGRAM FOR BREAST CANCER: ICD-10-CM

## 2023-11-20 DIAGNOSIS — R92.8 ABNORMALITY OF RIGHT BREAST ON SCREENING MAMMOGRAM: Primary | ICD-10-CM

## 2023-11-20 PROCEDURE — 77063 BREAST TOMOSYNTHESIS BI: CPT

## 2023-11-21 NOTE — RESULT ENCOUNTER NOTE
It looks like she has already had orders placed for a follow-up diagnostic right mammogram following her screening mammogram probably due to breast density. Please check with her and make sure that she is aware of this and that she is scheduled.   Thanks

## 2023-12-07 ENCOUNTER — HOSPITAL ENCOUNTER (OUTPATIENT)
Facility: HOSPITAL | Age: 73
Discharge: HOME | End: 2023-12-07
Attending: INTERNAL MEDICINE
Payer: MEDICARE

## 2023-12-07 ENCOUNTER — HOSPITAL ENCOUNTER (OUTPATIENT)
Facility: HOSPITAL | Age: 73
End: 2023-12-07
Attending: INTERNAL MEDICINE
Payer: MEDICARE

## 2023-12-07 DIAGNOSIS — R92.8 ABNORMAL MAMMOGRAM: ICD-10-CM

## 2023-12-07 PROCEDURE — G0279 TOMOSYNTHESIS, MAMMO: HCPCS

## 2023-12-07 PROCEDURE — 76642 ULTRASOUND BREAST LIMITED: CPT

## 2024-01-05 ENCOUNTER — OFFICE VISIT (OUTPATIENT)
Age: 74
End: 2024-01-05
Payer: MEDICARE

## 2024-01-05 VITALS
TEMPERATURE: 98.9 F | SYSTOLIC BLOOD PRESSURE: 126 MMHG | WEIGHT: 121.2 LBS | BODY MASS INDEX: 20.19 KG/M2 | HEIGHT: 65 IN | DIASTOLIC BLOOD PRESSURE: 63 MMHG | OXYGEN SATURATION: 98 % | RESPIRATION RATE: 18 BRPM | HEART RATE: 78 BPM

## 2024-01-05 DIAGNOSIS — I10 PRIMARY HYPERTENSION: ICD-10-CM

## 2024-01-05 DIAGNOSIS — M25.512 ACUTE PAIN OF LEFT SHOULDER: Primary | ICD-10-CM

## 2024-01-05 PROCEDURE — G8420 CALC BMI NORM PARAMETERS: HCPCS | Performed by: INTERNAL MEDICINE

## 2024-01-05 PROCEDURE — 1123F ACP DISCUSS/DSCN MKR DOCD: CPT | Performed by: INTERNAL MEDICINE

## 2024-01-05 PROCEDURE — 3078F DIAST BP <80 MM HG: CPT | Performed by: INTERNAL MEDICINE

## 2024-01-05 PROCEDURE — 1090F PRES/ABSN URINE INCON ASSESS: CPT | Performed by: INTERNAL MEDICINE

## 2024-01-05 PROCEDURE — G8399 PT W/DXA RESULTS DOCUMENT: HCPCS | Performed by: INTERNAL MEDICINE

## 2024-01-05 PROCEDURE — 3074F SYST BP LT 130 MM HG: CPT | Performed by: INTERNAL MEDICINE

## 2024-01-05 PROCEDURE — 99213 OFFICE O/P EST LOW 20 MIN: CPT | Performed by: INTERNAL MEDICINE

## 2024-01-05 PROCEDURE — G8427 DOCREV CUR MEDS BY ELIG CLIN: HCPCS | Performed by: INTERNAL MEDICINE

## 2024-01-05 PROCEDURE — 1036F TOBACCO NON-USER: CPT | Performed by: INTERNAL MEDICINE

## 2024-01-05 PROCEDURE — G8484 FLU IMMUNIZE NO ADMIN: HCPCS | Performed by: INTERNAL MEDICINE

## 2024-01-05 PROCEDURE — 3017F COLORECTAL CA SCREEN DOC REV: CPT | Performed by: INTERNAL MEDICINE

## 2024-01-05 ASSESSMENT — PATIENT HEALTH QUESTIONNAIRE - PHQ9
2. FEELING DOWN, DEPRESSED OR HOPELESS: 0
SUM OF ALL RESPONSES TO PHQ QUESTIONS 1-9: 0
SUM OF ALL RESPONSES TO PHQ QUESTIONS 1-9: 0
SUM OF ALL RESPONSES TO PHQ9 QUESTIONS 1 & 2: 0
SUM OF ALL RESPONSES TO PHQ QUESTIONS 1-9: 0
SUM OF ALL RESPONSES TO PHQ QUESTIONS 1-9: 0
1. LITTLE INTEREST OR PLEASURE IN DOING THINGS: 0

## 2024-01-05 NOTE — PROGRESS NOTES
1. Acute pain of left shoulder  Pain localizes to the insertion of the deltoid on the left.  There is no other findings on physical exam.  Given the fleeting nature of her symptoms it is suspicious for musculoskeletal issue and she might benefit from physical therapy.  - Amb External Referral To Physical Therapy    2. Primary hypertension  Blood pressure is at goal.         Chief Complaint   Patient presents with    Arm Pain     Pt reports having left arm pain for the past month. Pt denies injury.    Hypertension         Orders Placed This Encounter   Procedures    Amb External Referral To Physical Therapy     Referral Priority:   Routine     Referral Type:   Eval and Treat     Referral Reason:   Specialty Services Required     Referred to Provider:   Elen Redding     Requested Specialty:   Physical Therapist     Number of Visits Requested:   1       Holden Stevens MD, FACP      HPI:         is a 73 y.o. female who arrives for recent left shoulder pain which occurred around [place].  She was lifting, pushing and pulling and notes that occasional arm movements would result in a very sharp pain in the left deltoid area.  It seems to be much better today.    She reports no difficulty with her blood pressure medications.        Allergies   Allergen Reactions    Sulfa Antibiotics Hives and Other (See Comments)    Clam Shell Nausea And Vomiting    Shellfish Allergy Nausea And Vomiting     Mullusks    Sulfur Hives       Outpatient Encounter Medications as of 1/5/2024   Medication Sig Dispense Refill    teriparatide (FORTEO) 620 MCG/2.48ML SOPN injection Inject 0.08 mLs into the skin daily 2.48 mL 11    estradiol (ESTRACE) 0.1 MG/GM vaginal cream       fluticasone (FLONASE) 50 MCG/ACT nasal spray ceived the following from [facility]: Outside name: fluticasone propionate (FLONASE) 50 mcg/actuation nasal spray      labetalol (NORMODYNE) 200 MG tablet TAKE 1 TABLET BY MOUTH TWICE DAILY FOR 
No Help Needed No Help Needed   Walk across the room (includes cane/walker) No Help Needed No Help Needed No Help Needed No Help Needed No Help Needed   Using the telphone No Help Needed No Help Needed No Help Needed No Help Needed No Help Needed   Taking your medications No Help Needed No Help Needed No Help Needed No Help Needed No Help Needed   Preparing meals No Help Needed No Help Needed No Help Needed No Help Needed No Help Needed   Managing money (expenses/bills) No Help Needed No Help Needed No Help Needed No Help Needed No Help Needed   Moderately strenuous housework (laundry) No Help Needed No Help Needed No Help Needed No Help Needed No Help Needed   Shopping for personal items (toiletries/medicines) No Help Needed No Help Needed No Help Needed No Help Needed No Help Needed   Shopping for groceries No Help Needed No Help Needed No Help Needed No Help Needed No Help Needed   Driving No Help Needed No Help Needed No Help Needed No Help Needed No Help Needed   Climbing a flight of stairs No Help Needed No Help Needed No Help Needed No Help Needed No Help Needed   Getting to places beyond walking distances No Help Needed No Help Needed No Help Needed No Help Needed No Help Needed           1/5/2024     1:00 PM   AMB Abuse Screening   Do you ever feel afraid of your partner? N   Are you in a relationship with someone who physically or mentally threatens you? N   Is it safe for you to go home? Y       Advance Care Planning     The patient has appointed the following active healthcare agents:    Primary Decision Maker: Bruce Del Cid - Other - [phone]    Primary Decision Maker: Lexy Freitas - Child - [phone]    Primary Decision Maker: Aguilar Del Cid - Child - [phone]    Secondary Decision Maker: Broderick Del Cid - Spouse - [phone]

## 2024-02-15 ENCOUNTER — TELEPHONE (OUTPATIENT)
Age: 74
End: 2024-02-15

## 2024-02-15 NOTE — TELEPHONE ENCOUNTER
[phone] contact # stacy Guillen, would like a referral for Colonoscopy[py if Hilda thinks this is a giood idea.  Pls call back to discuss    Thanks,

## 2024-02-15 NOTE — TELEPHONE ENCOUNTER
Ref pt to [facility] ([phone])  Pt is aware referral will be sent over and closed on our end  Return call completed. Patient states that Uro/GYN is concerned she may have polyps and requests a colonoscopy. Last cologuard in 7/2021. Patient requests referral to Dr Biggs locally for scheduling. Message routed to PCP for approval.

## 2024-02-22 ENCOUNTER — NURSE ONLY (OUTPATIENT)
Age: 74
End: 2024-02-22

## 2024-02-22 ENCOUNTER — TRANSCRIBE ORDERS (OUTPATIENT)
Facility: HOSPITAL | Age: 74
End: 2024-02-22

## 2024-02-22 ENCOUNTER — HOSPITAL ENCOUNTER (OUTPATIENT)
Facility: HOSPITAL | Age: 74
Discharge: HOME | End: 2024-02-22
Attending: OBSTETRICS & GYNECOLOGY
Payer: MEDICARE

## 2024-02-22 DIAGNOSIS — N93.9 VAGINAL BLEEDING: ICD-10-CM

## 2024-02-22 DIAGNOSIS — N95.2 VAGINAL ATROPHY: Primary | ICD-10-CM

## 2024-02-22 DIAGNOSIS — N36.2 URETHRAL CARUNCLE: ICD-10-CM

## 2024-02-22 DIAGNOSIS — R10.9 ABDOMINAL PRESSURE: ICD-10-CM

## 2024-02-22 DIAGNOSIS — N95.2 VAGINAL ATROPHY: ICD-10-CM

## 2024-02-22 DIAGNOSIS — R35.0 FREQUENT URINATION: Primary | ICD-10-CM

## 2024-02-22 PROCEDURE — 76830 TRANSVAGINAL US NON-OB: CPT

## 2024-02-23 LAB
APPEARANCE UR: CLEAR
BACTERIA URNS QL MICRO: NEGATIVE /HPF
BILIRUB UR QL: NEGATIVE
COLOR UR: NORMAL
EPITH CASTS URNS QL MICRO: NORMAL /LPF
GLUCOSE UR STRIP.AUTO-MCNC: NEGATIVE MG/DL
HGB UR QL STRIP: NEGATIVE
HYALINE CASTS URNS QL MICRO: NORMAL /LPF (ref 0–5)
KETONES UR QL STRIP.AUTO: NEGATIVE MG/DL
LEUKOCYTE ESTERASE UR QL STRIP.AUTO: NEGATIVE
NITRITE UR QL STRIP.AUTO: NEGATIVE
PH UR STRIP: 5.5 (ref 5–8)
PROT UR STRIP-MCNC: NEGATIVE MG/DL
RBC #/AREA URNS HPF: NORMAL /HPF (ref 0–5)
SP GR UR REFRACTOMETRY: 1.01 (ref 1–1.03)
URINE CULTURE IF INDICATED: NORMAL
UROBILINOGEN UR QL STRIP.AUTO: 0.2 EU/DL (ref 0.2–1)
WBC URNS QL MICRO: NORMAL /HPF (ref 0–4)

## 2024-04-02 DIAGNOSIS — M25.512 ACUTE PAIN OF LEFT SHOULDER: Primary | ICD-10-CM

## 2024-04-19 ENCOUNTER — OFFICE VISIT (OUTPATIENT)
Age: 74
End: 2024-04-19

## 2024-04-19 ENCOUNTER — PREP FOR PROCEDURE (OUTPATIENT)
Age: 74
End: 2024-04-19

## 2024-04-19 VITALS
SYSTOLIC BLOOD PRESSURE: 128 MMHG | HEIGHT: 65 IN | TEMPERATURE: 98.7 F | WEIGHT: 121 LBS | RESPIRATION RATE: 18 BRPM | BODY MASS INDEX: 20.16 KG/M2 | DIASTOLIC BLOOD PRESSURE: 76 MMHG | OXYGEN SATURATION: 97 % | HEART RATE: 69 BPM

## 2024-04-19 DIAGNOSIS — K62.5 BRBPR (BRIGHT RED BLOOD PER RECTUM): ICD-10-CM

## 2024-04-19 DIAGNOSIS — Z86.010 HISTORY OF COLON POLYPS: ICD-10-CM

## 2024-04-19 DIAGNOSIS — K62.5 BRBPR (BRIGHT RED BLOOD PER RECTUM): Primary | ICD-10-CM

## 2024-04-19 PROBLEM — Z86.0100 HISTORY OF COLON POLYPS: Status: ACTIVE | Noted: 2024-04-19

## 2024-04-19 ASSESSMENT — PATIENT HEALTH QUESTIONNAIRE - PHQ9
SUM OF ALL RESPONSES TO PHQ QUESTIONS 1-9: 0
SUM OF ALL RESPONSES TO PHQ QUESTIONS 1-9: 0
2. FEELING DOWN, DEPRESSED OR HOPELESS: NOT AT ALL
SUM OF ALL RESPONSES TO PHQ9 QUESTIONS 1 & 2: 0
SUM OF ALL RESPONSES TO PHQ QUESTIONS 1-9: 0
SUM OF ALL RESPONSES TO PHQ QUESTIONS 1-9: 0
1. LITTLE INTEREST OR PLEASURE IN DOING THINGS: NOT AT ALL

## 2024-04-19 NOTE — PATIENT INSTRUCTIONS
Colonoscopy: Before Your Procedure  What is a colonoscopy?     A colonoscopy is a test that lets a doctor look inside your colon. The doctor uses a thin, lighted tube called a colonoscope to look for problems. These include small growths called polyps, cancer, or bleeding.  During the test, the doctor can take samples of tissue that can be checked for cancer or other problems. This is called a biopsy. The doctor can also take out polyps.  Before the test, you will need to stop eating solid foods. You also will be given instructions on how to clean out your colon. This helps your doctor be able to see inside your colon during the test.  How do you prepare for the procedure?  Procedures can be stressful. This information will help you understand what you can expect. And it will help you safely prepare for your procedure.  Preparing for the procedure    Be sure you have someone to take you home. Anesthesia and pain medicine will make it unsafe for you to drive or get home on your own.    Understand exactly what procedure is planned, along with the risks, benefits, and other options.     Tell your doctor ALL the medicines, vitamins, supplements, and herbal remedies you take. Some may increase the risk of problems during your procedure. Your doctor will tell you if you should stop taking any of them before the procedure and how soon to do it.     If you take a medicine that prevents blood clots, your doctor may tell you to stop taking it before your procedure. Or your doctor may tell you to keep taking it. (These medicines include aspirin and other blood thinners.) Make sure that you understand exactly what your doctor wants you to do.     Make sure your doctor and the hospital have a copy of your advance directive. If you don't have one, you may want to prepare one. It lets others know your health care wishes. It's a good thing to have before any type of surgery or procedure.   Before the procedure    Follow your

## 2024-04-19 NOTE — PROGRESS NOTES
Identified pt with two pt identifiers (name and ). Reviewed chart in preparation for visit and have obtained necessary documentation.    Mindy Del Cid is a 74 y.o. female New Patient and Colonoscopy  .    Vitals:    24 0932   BP: 128/76   Site: Right Upper Arm   Position: Sitting   Cuff Size: Medium Adult   Pulse: 69   Resp: 18   Temp: 98.7 °F (37.1 °C)   TempSrc: Oral   SpO2: 97%   Weight: 54.9 kg (121 lb)   Height: 1.651 m (5' 5\")          1. Have you been to the ER, urgent care clinic since your last visit?  Hospitalized since your last visit?  no     2. Have you seen or consulted any other health care providers outside of the [facility] since your last visit?  Include any pap smears or colon screening.  no  
life    High Blood Pressure Father     Rheum Arthritis Father     Cancer Sister         Breast, CLL    Diabetes Sister         Peripheral neuropathy       Allergies   Allergen Reactions    Sulfa Antibiotics Hives and Other (See Comments)    Clam Shell Nausea And Vomiting    Shellfish Allergy Nausea And Vomiting     Mullusks    Sulfur Hives       Current Outpatient Medications   Medication Sig Dispense Refill    teriparatide (FORTEO) 620 MCG/2.48ML SOPN injection Inject 0.08 mLs into the skin daily 2.48 mL 11    estradiol (ESTRACE) 0.1 MG/GM vaginal cream       fluticasone (FLONASE) 50 MCG/ACT nasal spray ceived the following from [facility]: Outside name: fluticasone propionate (FLONASE) 50 mcg/actuation nasal spray      labetalol (NORMODYNE) 200 MG tablet TAKE 1 TABLET BY MOUTH TWICE DAILY FOR HIGH BLOOD PRESSURE       No current facility-administered medications for this visit.       The above histories, medications and allergies have been reviewed.    Review of Systems      /76 (Site: Right Upper Arm, Position: Sitting, Cuff Size: Medium Adult)   Pulse 69   Temp 98.7 °F (37.1 °C) (Oral)   Resp 18   Ht 1.651 m (5' 5\")   Wt 54.9 kg (121 lb)   LMP  (LMP Unknown) Comment: HX of tubal ligation  SpO2 97%   BMI 20.14 kg/m²   Physical Exam  Constitutional:       Appearance: Normal appearance.   Cardiovascular:      Rate and Rhythm: Normal rate and regular rhythm.   Pulmonary:      Effort: No respiratory distress.      Breath sounds: Normal breath sounds.   Abdominal:      General: There is no distension.      Palpations: Abdomen is soft. There is no mass.      Tenderness: There is no abdominal tenderness.   Neurological:      Mental Status: She is alert.          1. BRBPR (bright red blood per rectum)  I had recommended colonoscopy.  She is somewhat concerned about the risks of colonoscopy even though we explained that the risks of significant complication were less than 1 in 800 and closer

## 2024-04-19 NOTE — PROGRESS NOTES
ASSESSMENT and PLAN  1. Essential hypertension  Well-controlled.  No changes recommended.    2. Chest discomfort  No recurrence.  Workup normal 4/2022.  Calcium score 2 on cardiac CT 5/31/2022.  Continue exercise regimen at risk modification efforts.       Follow-up as needed.  The patient has been instructed and agrees to call our office with any issues or other concerns related to their cardiac condition(s) and/or complaint(s).      CHIEF COMPLAINT  Routine follow-up    HPI:    Mindy Del Cid is a 74 y.o. female here for follow-up of labile hypertension.  She was last seen in the office 4/25/2023 by Dr. Hansen.  She was stable with good blood pressure control and no cardiac symptoms.  No acute cardiac issues have developed in the interim.  She is exercising more recently.  She denies limiting symptoms.  She sees Dr. Stevens regularly and her blood pressure remains well-controlled on her current medications.    PERTINENT CARDIAC HISTORY: (Records reviewed and summarized below)  Chest pain  ==> Echo 4/29/2022, EF 70%, 1+ AR, 1+ MR, RV nl, PASP 26  ==> GXT echo 5/23/2022, 9 mins, LVF nl, no ischemia  ==> Cardiac CT 5/31/2022, calcium score 2 (20th percentile)    Hypertension      Past medical history, past surgical history, family history, social history, and medications were all reviewed with the patient today and updated as necessary.     Current Outpatient Medications   Medication Sig    BD PEN NEEDLE RAMIRO 2ND GEN 32G X 4 MM MISC USE WITH FORTEO EVERY DAY    fluorouracil (EFUDEX) 5 % cream Apply topically daily    labetalol (NORMODYNE) 200 MG tablet TAKE 1 TABLET BY MOUTH TWICE DAILY FOR HIGH BLOOD PRESSURE    teriparatide (FORTEO) 620 MCG/2.48ML SOPN injection Inject 0.08 mLs into the skin daily    estradiol (ESTRACE) 0.1 MG/GM vaginal cream     fluticasone (FLONASE) 50 MCG/ACT nasal spray ceived the following from [facility]: Outside name: fluticasone propionate (FLONASE) 50 mcg/actuation

## 2024-04-23 RX ORDER — LABETALOL 200 MG/1
TABLET, FILM COATED ORAL
Qty: 180 TABLET | Refills: 1 | Status: SHIPPED | OUTPATIENT
Start: 2024-04-23

## 2024-04-29 ENCOUNTER — OFFICE VISIT (OUTPATIENT)
Age: 74
End: 2024-04-29
Payer: MEDICARE

## 2024-04-29 VITALS
TEMPERATURE: 98.5 F | HEIGHT: 65 IN | SYSTOLIC BLOOD PRESSURE: 106 MMHG | OXYGEN SATURATION: 96 % | DIASTOLIC BLOOD PRESSURE: 60 MMHG | HEART RATE: 73 BPM | RESPIRATION RATE: 20 BRPM | BODY MASS INDEX: 19.83 KG/M2 | WEIGHT: 119 LBS

## 2024-04-29 DIAGNOSIS — I10 ESSENTIAL HYPERTENSION: Primary | ICD-10-CM

## 2024-04-29 DIAGNOSIS — R07.89 CHEST DISCOMFORT: ICD-10-CM

## 2024-04-29 PROCEDURE — G8428 CUR MEDS NOT DOCUMENT: HCPCS | Performed by: INTERNAL MEDICINE

## 2024-04-29 PROCEDURE — 1036F TOBACCO NON-USER: CPT | Performed by: INTERNAL MEDICINE

## 2024-04-29 PROCEDURE — G8420 CALC BMI NORM PARAMETERS: HCPCS | Performed by: INTERNAL MEDICINE

## 2024-04-29 PROCEDURE — 1090F PRES/ABSN URINE INCON ASSESS: CPT | Performed by: INTERNAL MEDICINE

## 2024-04-29 PROCEDURE — G8399 PT W/DXA RESULTS DOCUMENT: HCPCS | Performed by: INTERNAL MEDICINE

## 2024-04-29 PROCEDURE — 99213 OFFICE O/P EST LOW 20 MIN: CPT | Performed by: INTERNAL MEDICINE

## 2024-04-29 PROCEDURE — 3017F COLORECTAL CA SCREEN DOC REV: CPT | Performed by: INTERNAL MEDICINE

## 2024-04-29 PROCEDURE — 1123F ACP DISCUSS/DSCN MKR DOCD: CPT | Performed by: INTERNAL MEDICINE

## 2024-04-29 PROCEDURE — 3074F SYST BP LT 130 MM HG: CPT | Performed by: INTERNAL MEDICINE

## 2024-04-29 PROCEDURE — 3078F DIAST BP <80 MM HG: CPT | Performed by: INTERNAL MEDICINE

## 2024-04-29 RX ORDER — PEN NEEDLE, DIABETIC 32GX 5/32"
NEEDLE, DISPOSABLE MISCELLANEOUS
Qty: 100 EACH | Refills: 3 | Status: SHIPPED | OUTPATIENT
Start: 2024-04-29

## 2024-04-29 RX ORDER — FLUOROURACIL 50 MG/G
CREAM TOPICAL DAILY
COMMUNITY
Start: 2024-04-17

## 2024-04-29 ASSESSMENT — PATIENT HEALTH QUESTIONNAIRE - PHQ9
1. LITTLE INTEREST OR PLEASURE IN DOING THINGS: NOT AT ALL
SUM OF ALL RESPONSES TO PHQ QUESTIONS 1-9: 0
2. FEELING DOWN, DEPRESSED OR HOPELESS: NOT AT ALL
SUM OF ALL RESPONSES TO PHQ9 QUESTIONS 1 & 2: 0
SUM OF ALL RESPONSES TO PHQ QUESTIONS 1-9: 0

## 2024-04-29 NOTE — PROGRESS NOTES
Identified pt with two pt identifiers(name and ). Reviewed record in preparation for visit and have obtained necessary documentation.  Chief Complaint   Patient presents with    Hypertension      /60 (Site: Left Upper Arm, Position: Sitting, Cuff Size: Medium Adult)   Pulse 73   Temp 98.5 °F (36.9 °C) (Temporal)   Resp 20   Ht 1.651 m (5' 5\")   Wt 54 kg (119 lb)   LMP  (LMP Unknown)   SpO2 96%   BMI 19.80 kg/m²       Medications reviewed/approved by provider.      Health Maintenance Review: Patient reminded of \"due or due soon\" health maintenance. I have asked the patient to contact his/her primary care provider (PCP) for follow-up on his/her health maintenance.    Coordination of Care Questionnaire:  :   1) Have you been to an emergency room, urgent care, or hospitalized since your last visit?  If yes, where when, and reason for visit? no       2. Have seen or consulted any other health care provider since your last visit?   If yes, where when, and reason for visit?  no      Patient is accompanied by self I have received verbal consent from Mindy Del Cid to discuss any/all medical information while they are present in the room.

## 2024-06-05 ENCOUNTER — ANESTHESIA EVENT (OUTPATIENT)
Facility: HOSPITAL | Age: 74
End: 2024-06-05
Payer: MEDICARE

## 2024-06-05 NOTE — ANESTHESIA PRE PROCEDURE
Department of Anesthesiology  Preprocedure Note       Name:  Mindy Del Cid   Age:  74 y.o.  :  1950                                          MRN:  [id number]         Date:  2024      Surgeon: Surgeon(s):  Shant Biggs MD    Procedure: Procedure(s):  COLONOSCOPY    Medications prior to admission:   Prior to Admission medications    Medication Sig Start Date End Date Taking? Authorizing Provider   BD PEN NEEDLE RAMIRO 2ND GEN 32G X 4 MM MISC USE WITH FORTEO EVERY DAY 24   Holden Stevens MD   fluorouracil (EFUDEX) 5 % cream Apply topically daily 24   ProviderToro MD   labetalol (NORMODYNE) 200 MG tablet TAKE 1 TABLET BY MOUTH TWICE DAILY FOR HIGH BLOOD PRESSURE 24   Holden Stevens MD   teriparatide (FORTEO) 620 MCG/2.48ML SOPN injection Inject 0.08 mLs into the skin daily 23   Holden Stevens MD   estradiol (ESTRACE) 0.1 MG/GM vaginal cream  23   Provider, MD Toro   fluticasone (FLONASE) 50 MCG/ACT nasal spray ceived the following from [facility]: Outside name: fluticasone propionate (FLONASE) 50 mcg/actuation nasal spray 23   Automatic Reconciliation, Ar       Current medications:    No current facility-administered medications for this encounter.     Current Outpatient Medications   Medication Sig Dispense Refill   • BD PEN NEEDLE RAMIRO 2ND GEN 32G X 4 MM MISC USE WITH FORTEO EVERY  each 3   • fluorouracil (EFUDEX) 5 % cream Apply topically daily     • labetalol (NORMODYNE) 200 MG tablet TAKE 1 TABLET BY MOUTH TWICE DAILY FOR HIGH BLOOD PRESSURE 180 tablet 1   • teriparatide (FORTEO) 620 MCG/2.48ML SOPN injection Inject 0.08 mLs into the skin daily 2.48 mL 11   • estradiol (ESTRACE) 0.1 MG/GM vaginal cream      • fluticasone (FLONASE) 50 MCG/ACT nasal spray ceived the following from [facility]: Outside name: fluticasone propionate (FLONASE) 50 mcg/actuation nasal spray         Allergies:    Allergies   Allergen

## 2024-06-17 RX ORDER — MULTIVIT-MIN/IRON/FOLIC ACID/K 18-600-40
CAPSULE ORAL
COMMUNITY

## 2024-06-25 ENCOUNTER — PREP FOR PROCEDURE (OUTPATIENT)
Age: 74
End: 2024-06-25

## 2024-06-25 RX ORDER — SODIUM CHLORIDE 0.9 % (FLUSH) 0.9 %
5-40 SYRINGE (ML) INJECTION PRN
Status: CANCELLED | OUTPATIENT
Start: 2024-06-25

## 2024-06-25 RX ORDER — SODIUM CHLORIDE 0.9 % (FLUSH) 0.9 %
5-40 SYRINGE (ML) INJECTION EVERY 12 HOURS SCHEDULED
Status: CANCELLED | OUTPATIENT
Start: 2024-06-25

## 2024-06-25 RX ORDER — SODIUM CHLORIDE 9 MG/ML
INJECTION, SOLUTION INTRAVENOUS PRN
Status: CANCELLED | OUTPATIENT
Start: 2024-06-25

## 2024-06-25 RX ORDER — SODIUM CHLORIDE, SODIUM LACTATE, POTASSIUM CHLORIDE, CALCIUM CHLORIDE 600; 310; 30; 20 MG/100ML; MG/100ML; MG/100ML; MG/100ML
INJECTION, SOLUTION INTRAVENOUS CONTINUOUS
Status: CANCELLED | OUTPATIENT
Start: 2024-06-25

## 2024-06-25 NOTE — H&P
HPI    74-year-old female who presents as a referral from Dr. Stevens for possible rectal bleeding and personal history of colon polyps.  She apparently has been evaluated for what was suspected vaginal bleeding by urogynecology.  They believe that there possibly is a component of rectal bleeding and and recommended that she be evaluated by colonoscopy which Dr. Stevens had concurred with.  She shares that her last colonoscopy was in 2015 at which point they found diverticulosis and a rectal polyp that was a tubular adenoma.  She does not recall when they told her to follow-up.  She had a Cologuard 3 years ago which was negative.  She has no family history of colon cancer but she does have a history of colon polyps.    She is not convinced that she is having rectal bleeding.  She states that when she wipes she has not noticed any blood but she does notice blood on the pad from the 2 episodes of bleeding that she occurred that she believes possibly were vaginal.  She denies any diarrhea or constipation or unexpected weight loss.    She had a BTL and a right forearm ORIF.    She does not smoke.  She has 1 to 2 glasses of wine a night.    She does have a history of hypertension.      Past Medical History:   Diagnosis Date    Basal cell carcinoma     excised from nose and clavicl    Cerumen impaction     Endocrine disorder     Hypertension     Menopause     age 52    Nystagmus     OA (osteoarthritis) of finger     Peripheral sensory neuropathy age 53    mild    Thyroid nodule     UTI (lower urinary tract infection)     recurrent       Past Surgical History:   Procedure Laterality Date    CATARACT REMOVAL Bilateral     lens implants    COLONOSCOPY  2008    normal    ORTHOPEDIC SURGERY  arthroscopic    knee    OTHER SURGICAL HISTORY      plate right forearm    TUBAL LIGATION      URETHRLYS, TRANSVAG W/ SCOPE      dilation       Social History     Socioeconomic History    Marital status:      Spouse name: Not on

## 2024-06-26 ENCOUNTER — HOSPITAL ENCOUNTER (OUTPATIENT)
Facility: HOSPITAL | Age: 74
Setting detail: OUTPATIENT SURGERY
Discharge: HOME | End: 2024-06-26
Attending: SURGERY | Admitting: SURGERY
Payer: MEDICARE

## 2024-06-26 ENCOUNTER — ANESTHESIA (OUTPATIENT)
Facility: HOSPITAL | Age: 74
End: 2024-06-26
Payer: MEDICARE

## 2024-06-26 VITALS
HEIGHT: 65 IN | RESPIRATION RATE: 13 BRPM | HEART RATE: 61 BPM | TEMPERATURE: 98.7 F | BODY MASS INDEX: 19.33 KG/M2 | DIASTOLIC BLOOD PRESSURE: 67 MMHG | WEIGHT: 116 LBS | SYSTOLIC BLOOD PRESSURE: 145 MMHG | OXYGEN SATURATION: 98 %

## 2024-06-26 PROCEDURE — 7100000000 HC PACU RECOVERY - FIRST 15 MIN: Performed by: SURGERY

## 2024-06-26 PROCEDURE — 3600000002 HC SURGERY LEVEL 2 BASE: Performed by: SURGERY

## 2024-06-26 PROCEDURE — 7100000001 HC PACU RECOVERY - ADDTL 15 MIN: Performed by: SURGERY

## 2024-06-26 PROCEDURE — 88305 TISSUE EXAM BY PATHOLOGIST: CPT

## 2024-06-26 PROCEDURE — 6360000002 HC RX W HCPCS: Performed by: ANESTHESIOLOGY

## 2024-06-26 PROCEDURE — 3700000001 HC ADD 15 MINUTES (ANESTHESIA): Performed by: SURGERY

## 2024-06-26 PROCEDURE — 3600000012 HC SURGERY LEVEL 2 ADDTL 15MIN: Performed by: SURGERY

## 2024-06-26 PROCEDURE — 2709999900 HC NON-CHARGEABLE SUPPLY: Performed by: SURGERY

## 2024-06-26 PROCEDURE — 3700000000 HC ANESTHESIA ATTENDED CARE: Performed by: SURGERY

## 2024-06-26 PROCEDURE — 2580000003 HC RX 258: Performed by: SURGERY

## 2024-06-26 PROCEDURE — 45385 COLONOSCOPY W/LESION REMOVAL: CPT | Performed by: SURGERY

## 2024-06-26 RX ORDER — SODIUM CHLORIDE 0.9 % (FLUSH) 0.9 %
5-40 SYRINGE (ML) INJECTION EVERY 12 HOURS SCHEDULED
Status: DISCONTINUED | OUTPATIENT
Start: 2024-06-26 | End: 2024-06-26 | Stop reason: HOSPADM

## 2024-06-26 RX ORDER — SODIUM CHLORIDE, SODIUM LACTATE, POTASSIUM CHLORIDE, CALCIUM CHLORIDE 600; 310; 30; 20 MG/100ML; MG/100ML; MG/100ML; MG/100ML
INJECTION, SOLUTION INTRAVENOUS CONTINUOUS
Status: DISCONTINUED | OUTPATIENT
Start: 2024-06-26 | End: 2024-06-26 | Stop reason: HOSPADM

## 2024-06-26 RX ORDER — PROPOFOL 10 MG/ML
INJECTION, EMULSION INTRAVENOUS PRN
Status: DISCONTINUED | OUTPATIENT
Start: 2024-06-26 | End: 2024-06-26 | Stop reason: SDUPTHER

## 2024-06-26 RX ORDER — SODIUM CHLORIDE 9 MG/ML
INJECTION, SOLUTION INTRAVENOUS PRN
Status: DISCONTINUED | OUTPATIENT
Start: 2024-06-26 | End: 2024-06-26 | Stop reason: HOSPADM

## 2024-06-26 RX ORDER — SODIUM CHLORIDE 0.9 % (FLUSH) 0.9 %
5-40 SYRINGE (ML) INJECTION PRN
Status: DISCONTINUED | OUTPATIENT
Start: 2024-06-26 | End: 2024-06-26 | Stop reason: HOSPADM

## 2024-06-26 RX ADMIN — PROPOFOL 20 MG: 10 INJECTION, EMULSION INTRAVENOUS at 10:39

## 2024-06-26 RX ADMIN — SODIUM CHLORIDE, POTASSIUM CHLORIDE, SODIUM LACTATE AND CALCIUM CHLORIDE: 600; 310; 30; 20 INJECTION, SOLUTION INTRAVENOUS at 09:20

## 2024-06-26 RX ADMIN — PROPOFOL 100 MCG/KG/MIN: 10 INJECTION, EMULSION INTRAVENOUS at 10:30

## 2024-06-26 RX ADMIN — PROPOFOL 80 MG: 10 INJECTION, EMULSION INTRAVENOUS at 10:29

## 2024-06-26 ASSESSMENT — PAIN - FUNCTIONAL ASSESSMENT: PAIN_FUNCTIONAL_ASSESSMENT: 0-10

## 2024-06-26 ASSESSMENT — PAIN SCALES - GENERAL
PAINLEVEL_OUTOF10: 0

## 2024-06-26 NOTE — BRIEF OP NOTE
Brief Postoperative Note      Patient: Mindy HEARD May  YOB: 1950  MRN: [id number]    Date of Procedure: 6/26/2024    Pre-Op Diagnosis Codes:     * BRBPR (bright red blood per rectum) [K62.5]     * History of colon polyps [Z86.010]    Post-Op Diagnosis: Same       Procedure(s):  COLONOSCOPY WITH HOT SNARE POLYPECTOMY    Surgeon(s):  Shant Biggs MD    Assistant:  * No surgical staff found *    Anesthesia: Monitor Anesthesia Care    Estimated Blood Loss (mL): Minimal    Complications: None    Specimens:   ID Type Source Tests Collected by Time Destination   1 : Ileocecal Valve Polyp Tissue Cecum SURGICAL PATHOLOGY Shant Biggs MD 6/26/2024 1046        Implants:  * No implants in log *      Drains: * No LDAs found *    Findings:  Infection Present At Time Of Surgery (PATOS) (choose all levels that have infection present):  No infection present  Other Findings:   1. Pan diverticulosis  2. Ileocecal valve polyp  3. Internal hemorrhoids        Electronically signed by Shant Biggs MD on 6/26/2024 at 10:55 AM

## 2024-06-26 NOTE — ANESTHESIA POSTPROCEDURE EVALUATION
Department of Anesthesiology  Postprocedure Note    Patient: Mindy Del Cid  MRN: [id number]  YOB: 1950  Date of evaluation: 6/26/2024    Procedure Summary       Date: 06/26/24 Room / Location: [facility] MAIN OR  / [facility] MAIN OR    Anesthesia Start: 1026 Anesthesia Stop: 1056    Procedure: COLONOSCOPY WITH HOT SNARE POLYPECTOMY (Lower GI Region) Diagnosis:       BRBPR (bright red blood per rectum)      History of colon polyps      (BRBPR (bright red blood per rectum) [K62.5])      (History of colon polyps [Z86.010])    Surgeons: Shant Biggs MD Responsible Provider: Berhane Moreno MD    Anesthesia Type: TIVA ASA Status: 2            Anesthesia Type: No value filed.    Jadyn Phase I:      Jadyn Phase II:      Anesthesia Post Evaluation    Patient location during evaluation: PACU  Patient participation: complete - patient participated  Level of consciousness: awake  Pain score: 0  Nausea & Vomiting: no nausea and no vomiting  Cardiovascular status: blood pressure returned to baseline  Respiratory status: acceptable and room air  Hydration status: euvolemic  Pain management: adequate    No notable events documented.

## 2024-06-26 NOTE — PERIOP NOTE
Endoscopy recovery  Patient returned to baseline, vital signs stable (see vital sign flowsheet). Patient offered liquids and tolerated well. Respiratory status within defined limits. Abdomen soft not tender. Skin with in defined limits. Responsible party driving patient home was given the opportunity to ask questions. Patient discharged with documented belongings.   
preoperative bowel prep.    Reviewed above preoperative instructions and answered questions by phone interview    Patient:  Mindy HEARD May   Date:     June 17, 2024  Time:   11:13 AM    RN:  Mindy Lyons RN    Date:     June 17, 2024  Time:   11:13 AM

## 2024-06-26 NOTE — INTERVAL H&P NOTE
Update History & Physical    The patient's History and Physical of June 25, 2024 was reviewed with the patient and I examined the patient. There was no change. The surgical site was confirmed by the patient and me.     Plan: The risks, benefits, expected outcome, and alternative to the recommended procedure have been discussed with the patient. Patient understands and wants to proceed with the procedure.     Electronically signed by Shant Biggs MD on 6/26/2024 at 10:21 AM

## 2024-06-26 NOTE — OP NOTE
well under 5 mm in size.  The patient also had pan diverticulosis that was present in the ascending colon as well as throughout the remainder of the colon.  The scope was carefully withdrawn with no additional abnormalities noted in the ascending, transverse, descending, or sigmoid colons other than the aforementioned diverticulosis.  The scope was pulled back into the rectum and retroflexed, which revealed internal hemorrhoids.  The scope was then straightened out and carefully withdrawn.    She tolerated the procedure well and transferred to PACU in stable condition.    DRAINS:  None.    DISPOSITION:   Stable.        MD DONNA SOLO/AQS  D:  06/26/2024 10:59:20  T:  06/26/2024 15:55:15  JOB #:  [id number]    CC:   Holden Stevens MD

## 2024-07-09 ENCOUNTER — OFFICE VISIT (OUTPATIENT)
Age: 74
End: 2024-07-09
Payer: MEDICARE

## 2024-07-09 VITALS
RESPIRATION RATE: 20 BRPM | WEIGHT: 118 LBS | DIASTOLIC BLOOD PRESSURE: 74 MMHG | TEMPERATURE: 98.1 F | HEART RATE: 71 BPM | HEIGHT: 65 IN | OXYGEN SATURATION: 95 % | SYSTOLIC BLOOD PRESSURE: 138 MMHG | BODY MASS INDEX: 19.66 KG/M2

## 2024-07-09 DIAGNOSIS — K57.90 DIVERTICULOSIS: ICD-10-CM

## 2024-07-09 DIAGNOSIS — Z86.010 HISTORY OF COLON POLYPS: Primary | ICD-10-CM

## 2024-07-09 PROCEDURE — 1090F PRES/ABSN URINE INCON ASSESS: CPT | Performed by: SURGERY

## 2024-07-09 PROCEDURE — G8399 PT W/DXA RESULTS DOCUMENT: HCPCS | Performed by: SURGERY

## 2024-07-09 PROCEDURE — 1123F ACP DISCUSS/DSCN MKR DOCD: CPT | Performed by: SURGERY

## 2024-07-09 PROCEDURE — 1036F TOBACCO NON-USER: CPT | Performed by: SURGERY

## 2024-07-09 PROCEDURE — 3075F SYST BP GE 130 - 139MM HG: CPT | Performed by: SURGERY

## 2024-07-09 PROCEDURE — 99213 OFFICE O/P EST LOW 20 MIN: CPT | Performed by: SURGERY

## 2024-07-09 PROCEDURE — G8420 CALC BMI NORM PARAMETERS: HCPCS | Performed by: SURGERY

## 2024-07-09 PROCEDURE — 3017F COLORECTAL CA SCREEN DOC REV: CPT | Performed by: SURGERY

## 2024-07-09 PROCEDURE — 3078F DIAST BP <80 MM HG: CPT | Performed by: SURGERY

## 2024-07-09 PROCEDURE — G8427 DOCREV CUR MEDS BY ELIG CLIN: HCPCS | Performed by: SURGERY

## 2024-07-09 ASSESSMENT — PATIENT HEALTH QUESTIONNAIRE - PHQ9
SUM OF ALL RESPONSES TO PHQ QUESTIONS 1-9: 0
SUM OF ALL RESPONSES TO PHQ QUESTIONS 1-9: 0
1. LITTLE INTEREST OR PLEASURE IN DOING THINGS: NOT AT ALL
SUM OF ALL RESPONSES TO PHQ9 QUESTIONS 1 & 2: 0
SUM OF ALL RESPONSES TO PHQ QUESTIONS 1-9: 0
2. FEELING DOWN, DEPRESSED OR HOPELESS: NOT AT ALL
SUM OF ALL RESPONSES TO PHQ QUESTIONS 1-9: 0

## 2024-07-09 NOTE — PROGRESS NOTES
Identified pt with two pt identifiers (name and ). Reviewed chart in preparation for visit and have obtained necessary documentation.    Mindy Del Cid is a 74 y.o. female Post-Op Check (Colonoscopy results)  .    Vitals:    24 1418 24 1421   BP: (!) 143/73 138/74   Site: Right Lower Arm    Position: Sitting    Cuff Size: Medium Adult    Pulse: 71    Resp: 20    Temp: 98.1 °F (36.7 °C)    TempSrc: Oral    SpO2: 95%    Weight: 53.5 kg (118 lb)    Height: 1.651 m (5' 5\")           1. Have you been to the ER, urgent care clinic since your last visit?  Hospitalized since your last visit?  no     2. Have you seen or consulted any other health care providers outside of the [facility] since your last visit?  Include any pap smears or colon screening.  No    BP elevated. Patient advised to  follow up with PCP and check BP twice a day at home.

## 2024-07-09 NOTE — PATIENT INSTRUCTIONS
High-Fiber Diet: Care Instructions  Overview     A high-fiber diet may help you relieve constipation and feel less bloated.  Your doctor and dietitian will help you make a high-fiber eating plan based on your personal needs. The plan will include the things you like to eat. It will also make sure that you get 25 to 35 grams of fiber a day.  Before you make changes to the way you eat, be sure to talk with your doctor or dietitian.  Follow-up care is a key part of your treatment and safety. Be sure to make and go to all appointments, and call your doctor if you are having problems. It's also a good idea to know your test results and keep a list of the medicines you take.  How can you care for yourself at home?  You can increase how much fiber you get if you eat more of certain foods. These foods include:  Whole-grain breads and cereals.  Fruits, such as pears, apples, and peaches. Eat the skins and peels if you can.  Vegetables, such as broccoli, cabbage, spinach, carrots, asparagus, and squash.  Starchy vegetables. These include potatoes with skins, kidney beans, and lima beans.  Take a fiber supplement every day if your doctor recommends it. Examples are Benefiber, Citrucel, FiberCon, and Metamucil. Ask your doctor how much to take.  Drink plenty of fluids. If you have kidney, heart, or liver disease and have to limit fluids, talk with your doctor before you increase the amount of fluids you drink.  Where can you learn more?  Go to https://www.[employer].net/patientEd and enter U654 to learn more about \"High-Fiber Diet: Care Instructions.\"  Current as of: May 9, 2022               Content Version: 13.6  © 7274-8682 [employer].   Care instructions adapted under license by [employer]. If you have questions about a medical condition or this instruction, always ask your healthcare professional. [employer] disclaims any warranty or liability for your use of this information.

## 2024-07-09 NOTE — PROGRESS NOTES
[facility]      Clinic Note - Follow up    Subjective     Mindy Del Cid returns for scheduled follow up today.  She is status post colonoscopy was performed back on June 26.  She has done well since the procedure.  Findings of a 3 mm tubulovillous adenoma of the ileocecal valve and pandiverticulosis as well as internal hemorrhoids were shared with her.  There was no signs of recent bleeding from her hemorrhoids.  No other significant abnormalities were noted.    Objective     /74   Pulse 71   Temp 98.1 °F (36.7 °C) (Oral)   Resp 20   Ht 1.651 m (5' 5\")   Wt 53.5 kg (118 lb)   LMP  (LMP Unknown) Comment: HX of tubal ligation  SpO2 95%   BMI 19.64 kg/m²       PE  GEN - Awake, alert, communicating appropriately.  NAD      Assessment     Mindy Del Cid is a 74 y.o.yr old female subcentimeter tubulovillous adenoma of the ileocecal valve.  Pandiverticulosis and internal hemorrhoids without signs of recent bleeding.    Plan     She is encouraged to continue a high-fiber diet.  She plans on having a discussion with her gynecologist and is unsure if she plans on proceeding with D&C.  She states she has had no further bleeding in over a year.  Consider repeat colonoscopy in 5 years based on the findings of a single tubulovillous adenoma.    Shant Biggs MD    CC: Dr. Stevens

## 2024-07-23 ENCOUNTER — TELEPHONE (OUTPATIENT)
Age: 74
End: 2024-07-23

## 2024-08-09 SDOH — ECONOMIC STABILITY: TRANSPORTATION INSECURITY
IN THE PAST 12 MONTHS, HAS LACK OF TRANSPORTATION KEPT YOU FROM MEETINGS, WORK, OR FROM GETTING THINGS NEEDED FOR DAILY LIVING?: NO

## 2024-08-09 SDOH — ECONOMIC STABILITY: INCOME INSECURITY: HOW HARD IS IT FOR YOU TO PAY FOR THE VERY BASICS LIKE FOOD, HOUSING, MEDICAL CARE, AND HEATING?: NOT HARD AT ALL

## 2024-08-09 SDOH — ECONOMIC STABILITY: FOOD INSECURITY: WITHIN THE PAST 12 MONTHS, THE FOOD YOU BOUGHT JUST DIDN'T LAST AND YOU DIDN'T HAVE MONEY TO GET MORE.: NEVER TRUE

## 2024-08-09 SDOH — ECONOMIC STABILITY: FOOD INSECURITY: WITHIN THE PAST 12 MONTHS, YOU WORRIED THAT YOUR FOOD WOULD RUN OUT BEFORE YOU GOT MONEY TO BUY MORE.: NEVER TRUE

## 2024-08-09 SDOH — HEALTH STABILITY: PHYSICAL HEALTH: ON AVERAGE, HOW MANY DAYS PER WEEK DO YOU ENGAGE IN MODERATE TO STRENUOUS EXERCISE (LIKE A BRISK WALK)?: 2 DAYS

## 2024-08-09 SDOH — HEALTH STABILITY: PHYSICAL HEALTH: ON AVERAGE, HOW MANY MINUTES DO YOU ENGAGE IN EXERCISE AT THIS LEVEL?: 30 MIN

## 2024-08-09 ASSESSMENT — PATIENT HEALTH QUESTIONNAIRE - PHQ9
SUM OF ALL RESPONSES TO PHQ QUESTIONS 1-9: 0
2. FEELING DOWN, DEPRESSED OR HOPELESS: NOT AT ALL
SUM OF ALL RESPONSES TO PHQ9 QUESTIONS 1 & 2: 0
SUM OF ALL RESPONSES TO PHQ QUESTIONS 1-9: 0
1. LITTLE INTEREST OR PLEASURE IN DOING THINGS: NOT AT ALL

## 2024-08-09 ASSESSMENT — LIFESTYLE VARIABLES
HAVE YOU OR SOMEONE ELSE BEEN INJURED AS A RESULT OF YOUR DRINKING: NO
HOW OFTEN DURING THE LAST YEAR HAVE YOU BEEN UNABLE TO REMEMBER WHAT HAPPENED THE NIGHT BEFORE BECAUSE YOU HAD BEEN DRINKING: NEVER
HOW OFTEN DURING THE LAST YEAR HAVE YOU NEEDED AN ALCOHOLIC DRINK FIRST THING IN THE MORNING TO GET YOURSELF GOING AFTER A NIGHT OF HEAVY DRINKING: NEVER
HAS A RELATIVE, FRIEND, DOCTOR, OR ANOTHER HEALTH PROFESSIONAL EXPRESSED CONCERN ABOUT YOUR DRINKING OR SUGGESTED YOU CUT DOWN: NO
HOW OFTEN DO YOU HAVE A DRINK CONTAINING ALCOHOL: 5
HOW OFTEN DURING THE LAST YEAR HAVE YOU HAD A FEELING OF GUILT OR REMORSE AFTER DRINKING: NEVER
HOW OFTEN DURING THE LAST YEAR HAVE YOU FAILED TO DO WHAT WAS NORMALLY EXPECTED FROM YOU BECAUSE OF DRINKING: NEVER
HOW OFTEN DURING THE LAST YEAR HAVE YOU FOUND THAT YOU WERE NOT ABLE TO STOP DRINKING ONCE YOU HAD STARTED: NEVER
HAS A RELATIVE, FRIEND, DOCTOR, OR ANOTHER HEALTH PROFESSIONAL EXPRESSED CONCERN ABOUT YOUR DRINKING OR SUGGESTED YOU CUT DOWN: NO
HOW OFTEN DO YOU HAVE SIX OR MORE DRINKS ON ONE OCCASION: 1
HOW OFTEN DURING THE LAST YEAR HAVE YOU NEEDED AN ALCOHOLIC DRINK FIRST THING IN THE MORNING TO GET YOURSELF GOING AFTER A NIGHT OF HEAVY DRINKING: NEVER
HOW MANY STANDARD DRINKS CONTAINING ALCOHOL DO YOU HAVE ON A TYPICAL DAY: 1
HOW OFTEN DO YOU HAVE A DRINK CONTAINING ALCOHOL: 4 OR MORE TIMES A WEEK
HOW OFTEN DURING THE LAST YEAR HAVE YOU FAILED TO DO WHAT WAS NORMALLY EXPECTED FROM YOU BECAUSE OF DRINKING: NEVER
HOW OFTEN DURING THE LAST YEAR HAVE YOU HAD A FEELING OF GUILT OR REMORSE AFTER DRINKING: NEVER
HOW MANY STANDARD DRINKS CONTAINING ALCOHOL DO YOU HAVE ON A TYPICAL DAY: 1 OR 2
HAVE YOU OR SOMEONE ELSE BEEN INJURED AS A RESULT OF YOUR DRINKING: NO
HOW OFTEN DURING THE LAST YEAR HAVE YOU FOUND THAT YOU WERE NOT ABLE TO STOP DRINKING ONCE YOU HAD STARTED: NEVER
HOW OFTEN DURING THE LAST YEAR HAVE YOU BEEN UNABLE TO REMEMBER WHAT HAPPENED THE NIGHT BEFORE BECAUSE YOU HAD BEEN DRINKING: NEVER

## 2024-08-12 ENCOUNTER — OFFICE VISIT (OUTPATIENT)
Age: 74
End: 2024-08-12
Payer: MEDICARE

## 2024-08-12 VITALS
RESPIRATION RATE: 18 BRPM | HEART RATE: 70 BPM | DIASTOLIC BLOOD PRESSURE: 64 MMHG | WEIGHT: 117.4 LBS | HEIGHT: 65 IN | BODY MASS INDEX: 19.56 KG/M2 | OXYGEN SATURATION: 96 % | SYSTOLIC BLOOD PRESSURE: 129 MMHG

## 2024-08-12 DIAGNOSIS — E55.9 VITAMIN D DEFICIENCY: ICD-10-CM

## 2024-08-12 DIAGNOSIS — I10 PRIMARY HYPERTENSION: ICD-10-CM

## 2024-08-12 DIAGNOSIS — Z00.00 MEDICARE ANNUAL WELLNESS VISIT, SUBSEQUENT: Primary | ICD-10-CM

## 2024-08-12 DIAGNOSIS — E53.8 CYANOCOBALAMIN DEFICIENCY: ICD-10-CM

## 2024-08-12 PROCEDURE — G8427 DOCREV CUR MEDS BY ELIG CLIN: HCPCS | Performed by: INTERNAL MEDICINE

## 2024-08-12 PROCEDURE — 1090F PRES/ABSN URINE INCON ASSESS: CPT | Performed by: INTERNAL MEDICINE

## 2024-08-12 PROCEDURE — 3017F COLORECTAL CA SCREEN DOC REV: CPT | Performed by: INTERNAL MEDICINE

## 2024-08-12 PROCEDURE — 1036F TOBACCO NON-USER: CPT | Performed by: INTERNAL MEDICINE

## 2024-08-12 PROCEDURE — 3074F SYST BP LT 130 MM HG: CPT | Performed by: INTERNAL MEDICINE

## 2024-08-12 PROCEDURE — G8399 PT W/DXA RESULTS DOCUMENT: HCPCS | Performed by: INTERNAL MEDICINE

## 2024-08-12 PROCEDURE — 99214 OFFICE O/P EST MOD 30 MIN: CPT | Performed by: INTERNAL MEDICINE

## 2024-08-12 PROCEDURE — 1123F ACP DISCUSS/DSCN MKR DOCD: CPT | Performed by: INTERNAL MEDICINE

## 2024-08-12 PROCEDURE — 3078F DIAST BP <80 MM HG: CPT | Performed by: INTERNAL MEDICINE

## 2024-08-12 PROCEDURE — G8420 CALC BMI NORM PARAMETERS: HCPCS | Performed by: INTERNAL MEDICINE

## 2024-08-12 PROCEDURE — G0439 PPPS, SUBSEQ VISIT: HCPCS | Performed by: INTERNAL MEDICINE

## 2024-08-12 NOTE — PROGRESS NOTES
Mindy Del Cid is a 74 y.o. female presenting for/with:    Chief Complaint   Patient presents with    Medicare AWV    Medication Check     States is concerned her forteo is not injection correctly. Questions answered    Hypertension     States has gotten dizzy more often. Dizzy episodes are accompanied by hot flashes.  Has been checking BP at home multiple times a day. Has a spreadsheet with her today.        Vitals:    08/12/24 1124   BP: 129/64   Site: Left Upper Arm   Position: Sitting   Cuff Size: Medium Adult   Pulse: 70   Resp: 18   SpO2: 96%   Weight: 53.3 kg (117 lb 6.4 oz)   Height: 1.651 m (5' 5\")       Pain Scale: 0 - No pain/10  Pain Location:     \"Have you been to the ER, urgent care clinic since your last visit?  Hospitalized since your last visit?\"    NO    “Have you seen or consulted any other health care providers outside of [facility] since your last visit?”    NO                 8/9/2024    12:21 PM   PHQ-9    Little interest or pleasure in doing things 0   Feeling down, depressed, or hopeless 0   PHQ-2 Score 0   PHQ-9 Total Score 0           1/5/2024     1:00 PM 8/10/2023     9:30 AM 8/4/2022    12:00 AM   [facility] AMB LEARNING ASSESSMENT   Primary Learner Patient Patient Patient   Primary Language ENGLISH ENGLISH ENGLISH   Learning Preference DEMONSTRATION DEMONSTRATION VIDEOS    PICTURES   Answered By patient patient self   Relationship to Learner SELF SELF SELF            8/9/2024    12:21 PM   Amb Fall Risk Assessment and TUG Test   Do you feel unsteady or are you worried about falling?  no   2 or more falls in past year? no   Fall with injury in past year? no           8/12/2024    11:00 AM 1/5/2024     1:00 PM 11/6/2023     9:00 AM 8/10/2023     9:00 AM 7/31/2023     9:00 AM 6/19/2023    11:00 AM   ADL ASSESSMENT   Feeding yourself No Help Needed No Help Needed No Help Needed No Help Needed No Help Needed No Help Needed   Getting from bed to chair No Help Needed No Help Needed No Help 
Rodlusks    Sulfur Hives       Outpatient Encounter Medications as of 8/12/2024   Medication Sig Dispense Refill    Cholecalciferol (VITAMIN D) 50 MCG (2000 UT) CAPS capsule Take by mouth      BD PEN NEEDLE RAMIRO 2ND GEN 32G X 4 MM MISC USE WITH FORTEO EVERY  each 3    labetalol (NORMODYNE) 200 MG tablet TAKE 1 TABLET BY MOUTH TWICE DAILY FOR HIGH BLOOD PRESSURE 180 tablet 1    teriparatide (FORTEO) 620 MCG/2.48ML SOPN injection Inject 0.08 mLs into the skin daily 2.48 mL 11    estradiol (ESTRACE) 0.1 MG/GM vaginal cream       fluticasone (FLONASE) 50 MCG/ACT nasal spray ceived the following from [facility]: Outside name: fluticasone propionate (FLONASE) 50 mcg/actuation nasal spray      fluorouracil (EFUDEX) 5 % cream Apply topically daily (Patient not taking: Reported on 8/12/2024)       No facility-administered encounter medications on file as of 8/12/2024.         Past Medical History:   Diagnosis Date    Basal cell carcinoma     excised from nose and clavicl    Cerumen impaction     Endocrine disorder     Hypertension     Menopause     age 52    Nystagmus     OA (osteoarthritis) of finger     Peripheral sensory neuropathy age 53    mild    Thyroid nodule     UTI (lower urinary tract infection)     recurrent         ROS:  Denies fever, chills, cough, chest pain, SOB,  nausea, vomiting, or diarrhea.  Denies wt loss, wt gain, hemoptysis, hematochezia or melena.    Physical Examination:    /64 (Site: Left Upper Arm, Position: Sitting, Cuff Size: Medium Adult)   Pulse 70   Resp 18   Ht 1.651 m (5' 5\")   Wt 53.3 kg (117 lb 6.4 oz)   LMP  (LMP Unknown) Comment: HX of tubal ligation  SpO2 96%   BMI 19.54 kg/m²    General:  Alert, cooperative, no distress.    Head:  Normocephalic, without obvious abnormality, atraumatic.   Eyes:  Conjunctivae/corneas clear. Pupils equal, round, reactive to light.    Chest: No wheezes, rales. Rubs or ronchi   Cardiac: RRR.

## 2024-08-12 NOTE — PATIENT INSTRUCTIONS
Learning About Being Active as an Older Adult  Why is being active important as you get older?     Being active is one of the best things you can do for your health. And it's never too late to start. Being active--or getting active, if you aren't already--has definite benefits. It can:  Give you more energy,  Keep your mind sharp.  Improve balance to reduce your risk of falls.  Help you manage chronic illness with fewer medicines.  No matter how old you are, how fit you are, or what health problems you have, there is a form of activity that will work for you. And the more physical activity you can do, the better your overall health will be.  What kinds of activity can help you stay healthy?  Being more active will make your daily activities easier. Physical activity includes planned exercise and things you do in daily life. There are four types of activity:  Aerobic.  Doing aerobic activity makes your heart and lungs strong.  Includes walking, dancing, and gardening.  Aim for at least 2½ hours spread throughout the week.  It improves your energy and can help you sleep better.  Muscle-strengthening.  This type of activity can help maintain muscle and strengthen bones.  Includes climbing stairs, using resistance bands, and lifting or carrying heavy loads.  Aim for at least twice a week.  It can help protect the knees and other joints.  Stretching.  Stretching gives you better range of motion in joints and muscles.  Includes upper arm stretches, calf stretches, and gentle yoga.  Aim for at least twice a week, preferably after your muscles are warmed up from other activities.  It can help you function better in daily life.  Balancing.  This helps you stay coordinated and have good posture.  Includes heel-to-toe walking, chandrika chi, and certain types of yoga.  Aim for at least 3 days a week.  It can reduce your risk of falling.  Even if you have a hard time meeting the recommendations, it's better to be more active

## 2024-08-13 LAB
ALBUMIN SERPL-MCNC: 3.8 G/DL (ref 3.5–5)
ALBUMIN/GLOB SERPL: 1.5 (ref 1.1–2.2)
ALP SERPL-CCNC: 96 U/L (ref 45–117)
ALT SERPL-CCNC: 29 U/L (ref 12–78)
ANION GAP SERPL CALC-SCNC: 2 MMOL/L (ref 5–15)
APPEARANCE UR: CLEAR
AST SERPL-CCNC: 17 U/L (ref 15–37)
BACTERIA URNS QL MICRO: NEGATIVE /HPF
BASOPHILS # BLD: 0 K/UL (ref 0–0.1)
BASOPHILS NFR BLD: 0 % (ref 0–1)
BILIRUB SERPL-MCNC: 0.8 MG/DL (ref 0.2–1)
BILIRUB UR QL: NEGATIVE
BUN SERPL-MCNC: 22 MG/DL (ref 6–20)
BUN/CREAT SERPL: 26 (ref 12–20)
CALCIUM SERPL-MCNC: 10.2 MG/DL (ref 8.5–10.1)
CHLORIDE SERPL-SCNC: 106 MMOL/L (ref 97–108)
CHOLEST SERPL-MCNC: 167 MG/DL
CO2 SERPL-SCNC: 34 MMOL/L (ref 21–32)
COLOR UR: ABNORMAL
CREAT SERPL-MCNC: 0.85 MG/DL (ref 0.55–1.02)
DIFFERENTIAL METHOD BLD: ABNORMAL
EOSINOPHIL # BLD: 0.1 K/UL (ref 0–0.4)
EOSINOPHIL NFR BLD: 2 % (ref 0–7)
EPITH CASTS URNS QL MICRO: ABNORMAL /LPF
ERYTHROCYTE [DISTWIDTH] IN BLOOD BY AUTOMATED COUNT: 12.8 % (ref 11.5–14.5)
GLOBULIN SER CALC-MCNC: 2.5 G/DL (ref 2–4)
GLUCOSE SERPL-MCNC: 85 MG/DL (ref 65–100)
GLUCOSE UR STRIP.AUTO-MCNC: NEGATIVE MG/DL
HCT VFR BLD AUTO: 35.7 % (ref 35–47)
HDLC SERPL-MCNC: 66 MG/DL
HDLC SERPL: 2.5 (ref 0–5)
HGB BLD-MCNC: 11.5 G/DL (ref 11.5–16)
HGB UR QL STRIP: NEGATIVE
HYALINE CASTS URNS QL MICRO: ABNORMAL /LPF (ref 0–5)
IMM GRANULOCYTES # BLD AUTO: 0 K/UL (ref 0–0.04)
IMM GRANULOCYTES NFR BLD AUTO: 1 % (ref 0–0.5)
KETONES UR QL STRIP.AUTO: NEGATIVE MG/DL
LDLC SERPL CALC-MCNC: 59 MG/DL (ref 0–100)
LEUKOCYTE ESTERASE UR QL STRIP.AUTO: NEGATIVE
LYMPHOCYTES # BLD: 1.7 K/UL (ref 0.8–3.5)
LYMPHOCYTES NFR BLD: 27 % (ref 12–49)
MCH RBC QN AUTO: 32.8 PG (ref 26–34)
MCHC RBC AUTO-ENTMCNC: 32.2 G/DL (ref 30–36.5)
MCV RBC AUTO: 101.7 FL (ref 80–99)
MONOCYTES # BLD: 0.6 K/UL (ref 0–1)
MONOCYTES NFR BLD: 9 % (ref 5–13)
NEUTS SEG # BLD: 3.8 K/UL (ref 1.8–8)
NEUTS SEG NFR BLD: 61 % (ref 32–75)
NITRITE UR QL STRIP.AUTO: NEGATIVE
NRBC # BLD: 0 K/UL (ref 0–0.01)
NRBC BLD-RTO: 0 PER 100 WBC
PH UR STRIP: 8.5 (ref 5–8)
PLATELET # BLD AUTO: 178 K/UL (ref 150–400)
PMV BLD AUTO: 12.5 FL (ref 8.9–12.9)
POTASSIUM SERPL-SCNC: 3.8 MMOL/L (ref 3.5–5.1)
PROT SERPL-MCNC: 6.3 G/DL (ref 6.4–8.2)
PROT UR STRIP-MCNC: ABNORMAL MG/DL
RBC # BLD AUTO: 3.51 M/UL (ref 3.8–5.2)
RBC #/AREA URNS HPF: ABNORMAL /HPF (ref 0–5)
SODIUM SERPL-SCNC: 142 MMOL/L (ref 136–145)
SP GR UR REFRACTOMETRY: 1.02 (ref 1–1.03)
SPECIMEN HOLD: NORMAL
TRIGL SERPL-MCNC: 210 MG/DL
TSH SERPL DL<=0.05 MIU/L-ACNC: 1.59 UIU/ML (ref 0.36–3.74)
UROBILINOGEN UR QL STRIP.AUTO: 1 EU/DL (ref 0.2–1)
VIT B12 SERPL-MCNC: 420 PG/ML (ref 193–986)
VLDLC SERPL CALC-MCNC: 42 MG/DL
WBC # BLD AUTO: 6.1 K/UL (ref 3.6–11)
WBC URNS QL MICRO: ABNORMAL /HPF (ref 0–4)

## 2024-08-14 DIAGNOSIS — E55.9 VITAMIN D DEFICIENCY: Primary | ICD-10-CM

## 2024-08-14 LAB — 25(OH)D3 SERPL-MCNC: 48.8 NG/ML (ref 30–100)

## 2024-09-12 DIAGNOSIS — M81.0 AGE-RELATED OSTEOPOROSIS WITHOUT CURRENT PATHOLOGICAL FRACTURE: Primary | ICD-10-CM

## 2024-10-10 RX ORDER — LABETALOL 200 MG/1
TABLET, FILM COATED ORAL
Qty: 180 TABLET | Refills: 1 | Status: SHIPPED | OUTPATIENT
Start: 2024-10-10

## 2024-10-14 DIAGNOSIS — M81.0 AGE-RELATED OSTEOPOROSIS WITHOUT CURRENT PATHOLOGICAL FRACTURE: ICD-10-CM

## 2024-10-14 RX ORDER — TERIPARATIDE 250 UG/ML
INJECTION, SOLUTION SUBCUTANEOUS
Qty: 2.48 ML | Refills: 11 | Status: SHIPPED | OUTPATIENT
Start: 2024-10-14

## 2024-10-15 ENCOUNTER — HOSPITAL ENCOUNTER (OUTPATIENT)
Facility: HOSPITAL | Age: 74
Discharge: HOME | End: 2024-10-18
Attending: INTERNAL MEDICINE
Payer: MEDICARE

## 2024-10-15 DIAGNOSIS — M81.0 AGE-RELATED OSTEOPOROSIS WITHOUT CURRENT PATHOLOGICAL FRACTURE: ICD-10-CM

## 2024-10-15 PROCEDURE — 77080 DXA BONE DENSITY AXIAL: CPT

## 2024-10-15 RX ORDER — LABETALOL 200 MG/1
TABLET, FILM COATED ORAL
Qty: 180 TABLET | Refills: 1 | Status: SHIPPED | OUTPATIENT
Start: 2024-10-15

## 2024-10-17 NOTE — RESULT ENCOUNTER NOTE
Dexa confirms osteoporosis.  Some areas are stable and a few are actually better.  We will discuss further at your next visit.

## 2024-10-28 ENCOUNTER — OFFICE VISIT (OUTPATIENT)
Age: 74
End: 2024-10-28
Payer: MEDICARE

## 2024-10-28 ENCOUNTER — HOSPITAL ENCOUNTER (OUTPATIENT)
Facility: HOSPITAL | Age: 74
Discharge: HOME | End: 2024-10-31
Payer: MEDICARE

## 2024-10-28 VITALS — HEART RATE: 73 BPM | RESPIRATION RATE: 18 BRPM | TEMPERATURE: 97.3 F | OXYGEN SATURATION: 98 %

## 2024-10-28 DIAGNOSIS — R05.2 SUBACUTE COUGH: Primary | ICD-10-CM

## 2024-10-28 DIAGNOSIS — R05.2 SUBACUTE COUGH: ICD-10-CM

## 2024-10-28 PROCEDURE — 1123F ACP DISCUSS/DSCN MKR DOCD: CPT | Performed by: INTERNAL MEDICINE

## 2024-10-28 PROCEDURE — 3017F COLORECTAL CA SCREEN DOC REV: CPT | Performed by: INTERNAL MEDICINE

## 2024-10-28 PROCEDURE — 71046 X-RAY EXAM CHEST 2 VIEWS: CPT

## 2024-10-28 PROCEDURE — 99214 OFFICE O/P EST MOD 30 MIN: CPT | Performed by: INTERNAL MEDICINE

## 2024-10-28 PROCEDURE — 1126F AMNT PAIN NOTED NONE PRSNT: CPT | Performed by: INTERNAL MEDICINE

## 2024-10-28 PROCEDURE — 1036F TOBACCO NON-USER: CPT | Performed by: INTERNAL MEDICINE

## 2024-10-28 PROCEDURE — G8420 CALC BMI NORM PARAMETERS: HCPCS | Performed by: INTERNAL MEDICINE

## 2024-10-28 PROCEDURE — G8428 CUR MEDS NOT DOCUMENT: HCPCS | Performed by: INTERNAL MEDICINE

## 2024-10-28 PROCEDURE — G8482 FLU IMMUNIZE ORDER/ADMIN: HCPCS | Performed by: INTERNAL MEDICINE

## 2024-10-28 PROCEDURE — 1090F PRES/ABSN URINE INCON ASSESS: CPT | Performed by: INTERNAL MEDICINE

## 2024-10-28 PROCEDURE — G8399 PT W/DXA RESULTS DOCUMENT: HCPCS | Performed by: INTERNAL MEDICINE

## 2024-10-28 RX ORDER — ALBUTEROL SULFATE 90 UG/1
2 INHALANT RESPIRATORY (INHALATION) 4 TIMES DAILY PRN
Qty: 18 G | Refills: 5 | Status: SHIPPED | OUTPATIENT
Start: 2024-10-28

## 2024-10-28 RX ORDER — PEN NEEDLE, DIABETIC 32GX 5/32"
NEEDLE, DISPOSABLE MISCELLANEOUS
Qty: 100 EACH | Refills: 3 | Status: SHIPPED | OUTPATIENT
Start: 2024-10-28

## 2024-10-28 NOTE — PROGRESS NOTES
Duration 30 minutes primarily education, review of imaging, labs and records.    Assessment & Plan  1. Cough.  The cough has been present for about 3 weeks, primarily occurring at night and not productive of sputum. There are no associated symptoms such as fever, chills, or aches, making COVID-19 or influenza unlikely. The symptoms are suspected to be due to environmental factors, possibly related to dust, mold, or smoke. An inhaler has been prescribed for use up to 3 or 4 times a day, with 1 or 2 puffs per use. The potential side effect of a slightly increased heart rate for 2 to 3 minutes post-use has been discussed. A chest x-ray has been ordered to ensure normal lung anatomy. If symptoms dramatically change or red flag symptoms such as fevers, chills, sweats, or aches develop, further evaluation will be necessary.              Chief Complaint   Patient presents with    Cough     Dry cough x 3 weeks worse at night .. Denies fever, chills, sinus, or headaches ... Not taking any otc medications         Orders Placed This Encounter   Procedures    XR CHEST (2 VW)     Standing Status:   Future     Standing Expiration Date:   11/28/2024     Scheduling Instructions:      [facility]     Order Specific Question:   Reason for exam:     Answer:   cough       Holden Stevens MD, FACP      History of Present Illness  The patient presents for evaluation of cough.    She reports a persistent cough that is more pronounced at night, accompanied by a sensation of heaviness in her lungs. She has no history of asthma and denies any use of tobacco or vaping products. The cough has been present for approximately 3 weeks. She has not experienced any fever or chills and the cough is non-productive. She also mentions that she has recently started using a heater at home. She confirms that no one in her immediate environment is ill.           Allergies   Allergen Reactions    Sulfa Antibiotics Hives and Other (See Comments)    Sulfisoxazole

## 2024-10-28 NOTE — PROGRESS NOTES
Mindy Del Cid is a 74 y.o. female presenting for/with:    Chief Complaint   Patient presents with    Cough     Dry cough x 3 weeks worse at night .. Denies fever, chills, sinus, or headaches ... Not taking any otc medications       Vitals:    10/28/24 0845   Pulse: 73   Resp: 18   Temp: 97.3 °F (36.3 °C)   TempSrc: Temporal   SpO2: 98%       Pain Scale: 0 - No pain/10  Pain Location:     \"Have you been to the ER, urgent care clinic since your last visit?  Hospitalized since your last visit?\"    NO    “Have you seen or consulted any other health care providers outside of [facility] since your last visit?”    NO                 8/9/2024    12:21 PM   PHQ-9    Little interest or pleasure in doing things 0   Feeling down, depressed, or hopeless 0   PHQ-2 Score 0   PHQ-9 Total Score 0           1/5/2024     1:00 PM 8/10/2023     9:30 AM 8/4/2022    12:00 AM   [facility] AMB LEARNING ASSESSMENT   Primary Learner Patient Patient Patient   Primary Language ENGLISH ENGLISH ENGLISH   Learning Preference DEMONSTRATION DEMONSTRATION VIDEOS    PICTURES   Answered By patient patient self   Relationship to Learner SELF SELF SELF            8/9/2024    12:21 PM   Amb Fall Risk Assessment and TUG Test   Do you feel unsteady or are you worried about falling?  no   2 or more falls in past year? no   Fall with injury in past year? no           8/12/2024    11:00 AM 1/5/2024     1:00 PM 11/6/2023     9:00 AM 8/10/2023     9:00 AM 7/31/2023     9:00 AM 6/19/2023    11:00 AM   ADL ASSESSMENT   Feeding yourself No Help Needed No Help Needed No Help Needed No Help Needed No Help Needed No Help Needed   Getting from bed to chair No Help Needed No Help Needed No Help Needed No Help Needed No Help Needed No Help Needed   Getting dressed No Help Needed No Help Needed No Help Needed No Help Needed No Help Needed No Help Needed   Bathing or showering No Help Needed No Help Needed No Help Needed No Help Needed No Help Needed No Help Needed

## 2024-10-31 ENCOUNTER — TRANSCRIBE ORDERS (OUTPATIENT)
Facility: HOSPITAL | Age: 74
End: 2024-10-31

## 2024-10-31 DIAGNOSIS — Z12.31 SCREENING MAMMOGRAM FOR BREAST CANCER: Primary | ICD-10-CM

## 2024-11-21 ENCOUNTER — NURSE ONLY (OUTPATIENT)
Age: 74
End: 2024-11-21
Payer: MEDICARE

## 2024-11-21 DIAGNOSIS — H61.23 BILATERAL IMPACTED CERUMEN: Primary | ICD-10-CM

## 2024-11-21 PROCEDURE — 99211 OFF/OP EST MAY X REQ PHY/QHP: CPT | Performed by: INTERNAL MEDICINE

## 2024-11-21 NOTE — PROGRESS NOTES
Subjective:      Mindy Del Cid is a 74 y.o. female who presents for evaluation of a plugged ear. She noticed the symptoms in both ears, 3 weeks ago.   Mindy denies ear pain.    Patient's medications, allergies, past medical, surgical, social and family histories were reviewed and updated as appropriate.    Review of Systems  Pertinent items are noted in HPI.      Objective:      LMP  (LMP Unknown) Comment: HX of tubal ligation  General: alert, appears stated age, and cooperative   Right Ear: normal appearance   Left Ear:  normal appearance   After removal: normal bilaterally         Assessment:      Cerumen Impaction, without otitis externa.      Plan:      Cerumen removed by flushing.  Care instructions given.  Home treatment: none.  Follow up as needed.

## 2024-12-10 ENCOUNTER — HOSPITAL ENCOUNTER (OUTPATIENT)
Facility: HOSPITAL | Age: 74
Discharge: HOME | End: 2024-12-13
Attending: INTERNAL MEDICINE
Payer: MEDICARE

## 2024-12-10 DIAGNOSIS — Z12.31 SCREENING MAMMOGRAM FOR BREAST CANCER: ICD-10-CM

## 2024-12-10 PROCEDURE — 77063 BREAST TOMOSYNTHESIS BI: CPT

## 2025-02-06 ENCOUNTER — OFFICE VISIT (OUTPATIENT)
Age: 75
End: 2025-02-06
Payer: MEDICARE

## 2025-02-06 VITALS — HEART RATE: 85 BPM | TEMPERATURE: 98.4 F | RESPIRATION RATE: 18 BRPM | OXYGEN SATURATION: 94 %

## 2025-02-06 DIAGNOSIS — R05.9 COUGH, UNSPECIFIED TYPE: Primary | ICD-10-CM

## 2025-02-06 DIAGNOSIS — R09.89 CHEST CONGESTION: ICD-10-CM

## 2025-02-06 LAB
EXP DATE SOLUTION: NORMAL
EXP DATE SWAB: NORMAL
EXPIRATION DATE: NORMAL
LOT NUMBER POC: NORMAL
LOT NUMBER SOLUTION: NORMAL
LOT NUMBER SWAB: NORMAL
SARS-COV-2 RNA, POC: NEGATIVE

## 2025-02-06 PROCEDURE — G8399 PT W/DXA RESULTS DOCUMENT: HCPCS | Performed by: INTERNAL MEDICINE

## 2025-02-06 PROCEDURE — 1159F MED LIST DOCD IN RCRD: CPT | Performed by: INTERNAL MEDICINE

## 2025-02-06 PROCEDURE — G8420 CALC BMI NORM PARAMETERS: HCPCS | Performed by: INTERNAL MEDICINE

## 2025-02-06 PROCEDURE — G8427 DOCREV CUR MEDS BY ELIG CLIN: HCPCS | Performed by: INTERNAL MEDICINE

## 2025-02-06 PROCEDURE — 1126F AMNT PAIN NOTED NONE PRSNT: CPT | Performed by: INTERNAL MEDICINE

## 2025-02-06 PROCEDURE — 1123F ACP DISCUSS/DSCN MKR DOCD: CPT | Performed by: INTERNAL MEDICINE

## 2025-02-06 PROCEDURE — 3017F COLORECTAL CA SCREEN DOC REV: CPT | Performed by: INTERNAL MEDICINE

## 2025-02-06 PROCEDURE — 99214 OFFICE O/P EST MOD 30 MIN: CPT | Performed by: INTERNAL MEDICINE

## 2025-02-06 PROCEDURE — 87635 SARS-COV-2 COVID-19 AMP PRB: CPT | Performed by: INTERNAL MEDICINE

## 2025-02-06 PROCEDURE — 1036F TOBACCO NON-USER: CPT | Performed by: INTERNAL MEDICINE

## 2025-02-06 PROCEDURE — 1090F PRES/ABSN URINE INCON ASSESS: CPT | Performed by: INTERNAL MEDICINE

## 2025-02-06 SDOH — ECONOMIC STABILITY: FOOD INSECURITY: WITHIN THE PAST 12 MONTHS, YOU WORRIED THAT YOUR FOOD WOULD RUN OUT BEFORE YOU GOT MONEY TO BUY MORE.: NEVER TRUE

## 2025-02-06 SDOH — ECONOMIC STABILITY: FOOD INSECURITY: WITHIN THE PAST 12 MONTHS, THE FOOD YOU BOUGHT JUST DIDN'T LAST AND YOU DIDN'T HAVE MONEY TO GET MORE.: NEVER TRUE

## 2025-02-06 ASSESSMENT — PATIENT HEALTH QUESTIONNAIRE - PHQ9
SUM OF ALL RESPONSES TO PHQ QUESTIONS 1-9: 0
SUM OF ALL RESPONSES TO PHQ9 QUESTIONS 1 & 2: 0
2. FEELING DOWN, DEPRESSED OR HOPELESS: NOT AT ALL
1. LITTLE INTEREST OR PLEASURE IN DOING THINGS: NOT AT ALL
SUM OF ALL RESPONSES TO PHQ QUESTIONS 1-9: 0

## 2025-02-06 NOTE — PROGRESS NOTES
Mindy Del Cid is a 74 y.o. female presenting for/with:    Chief Complaint   Patient presents with    Cough     Pt reports having a deep cough and chest congestion for the past week. Pt denies having SOB or fever.        Vitals:    02/06/25 1503   Pulse: 85   Resp: 18   Temp: 98.4 °F (36.9 °C)   TempSrc: Temporal   SpO2: 94%       Pain Scale: 0 - No pain/10  Pain Location:     \"Have you been to the ER, urgent care clinic since your last visit?  Hospitalized since your last visit?\"    NO    “Have you seen or consulted any other health care providers outside of [facility] since your last visit?”    NO                 2/6/2025     3:02 PM   PHQ-9    Little interest or pleasure in doing things 0   Feeling down, depressed, or hopeless 0   PHQ-2 Score 0   PHQ-9 Total Score 0           2/6/2025     2:50 PM 1/5/2024     1:00 PM 8/10/2023     9:30 AM 8/4/2022    12:00 AM   [facility] AMB LEARNING ASSESSMENT   Primary Learner Patient Patient Patient Patient   Primary Language ENGLISH ENGLISH ENGLISH ENGLISH   Learning Preference DEMONSTRATION DEMONSTRATION DEMONSTRATION VIDEOS    PICTURES   Answered By patient patient patient self   Relationship to Learner SELF SELF SELF SELF            2/6/2025     3:02 PM   Amb Fall Risk Assessment and TUG Test   Do you feel unsteady or are you worried about falling?  no   2 or more falls in past year? no   Fall with injury in past year? no           2/6/2025     3:00 PM 8/12/2024    11:00 AM 1/5/2024     1:00 PM 11/6/2023     9:00 AM 8/10/2023     9:00 AM 7/31/2023     9:00 AM 6/19/2023    11:00 AM   ADL ASSESSMENT   Feeding yourself No Help Needed No Help Needed No Help Needed No Help Needed No Help Needed No Help Needed No Help Needed   Getting from bed to chair No Help Needed No Help Needed No Help Needed No Help Needed No Help Needed No Help Needed No Help Needed   Getting dressed No Help Needed No Help Needed No Help Needed No Help Needed No Help Needed No Help Needed No Help

## 2025-02-06 NOTE — PROGRESS NOTES
Duration 30 minutes primarily education, review of imaging, labs and records.    1. Cough, unspecified type  Her sx are compatible with post nasal drip, possibly reflux.  Pointed out the using a wood burning stove at home is also a risk factor for persistent cough.  She will continue her follow up with ENT, use of Flonase and we will add a once a day antihistamine such as Claritin or Zyrtec  - AMB POC COVID-19 COV    2. Chest congestion  Chest is clear today on exam  - AMB POC COVID-19 COV         Chief Complaint   Patient presents with    Cough     Pt reports having a deep cough and chest congestion for the past week. Pt denies having SOB or fever.          Orders Placed This Encounter   Procedures    AMB POC COVID-19 COV     Order Specific Question:   Pregnant?     Answer:   No       Holden Stevens MD, FACP      HPI:         is a 74 y.o. female who arrives for episodic cough for a few weeks.  No fever or chills.  POC Covid test is negative today.    Sees Dr Cortes, ENT    Uses a wood burning stove.      Allergies   Allergen Reactions    Sulfa Antibiotics Hives and Other (See Comments)    Sulfisoxazole Other (See Comments)    Clam Shell Nausea And Vomiting    Shellfish Allergy Nausea And Vomiting     Mullusks    Sulfur Hives       Outpatient Encounter Medications as of 2/6/2025   Medication Sig Dispense Refill    BD PEN NEEDLE RAMIRO 2ND GEN 32G X 4 MM MISC USE WITH FORTEO EVERY  each 3    labetalol (NORMODYNE) 200 MG tablet TAKE 1 TABLET BY MOUTH TWICE DAILY FOR HIGH BLOOD PRESSURE 180 tablet 1    teriparatide (FORTEO) 620 MCG/2.48ML SOPN injection ADMINISTER 0.08 ML UNDER THE SKIN DAILY 2.48 mL 11    Cholecalciferol (VITAMIN D) 50 MCG (2000 UT) CAPS capsule Take by mouth      estradiol (ESTRACE) 0.1 MG/GM vaginal cream       fluticasone (FLONASE) 50 MCG/ACT nasal spray ceived the following from [facility]: Outside name: fluticasone propionate (FLONASE) 50 mcg/actuation nasal spray

## 2025-02-28 ENCOUNTER — OFFICE VISIT (OUTPATIENT)
Age: 75
End: 2025-02-28
Payer: MEDICARE

## 2025-02-28 VITALS
BODY MASS INDEX: 20.3 KG/M2 | TEMPERATURE: 97 F | DIASTOLIC BLOOD PRESSURE: 64 MMHG | SYSTOLIC BLOOD PRESSURE: 129 MMHG | WEIGHT: 122 LBS | HEART RATE: 79 BPM | RESPIRATION RATE: 18 BRPM | OXYGEN SATURATION: 95 %

## 2025-02-28 DIAGNOSIS — H04.301 DACRYOCYSTITIS OF RIGHT LACRIMAL SAC: Primary | ICD-10-CM

## 2025-02-28 PROCEDURE — 3017F COLORECTAL CA SCREEN DOC REV: CPT | Performed by: INTERNAL MEDICINE

## 2025-02-28 PROCEDURE — 3078F DIAST BP <80 MM HG: CPT | Performed by: INTERNAL MEDICINE

## 2025-02-28 PROCEDURE — 1036F TOBACCO NON-USER: CPT | Performed by: INTERNAL MEDICINE

## 2025-02-28 PROCEDURE — 1123F ACP DISCUSS/DSCN MKR DOCD: CPT | Performed by: INTERNAL MEDICINE

## 2025-02-28 PROCEDURE — 1159F MED LIST DOCD IN RCRD: CPT | Performed by: INTERNAL MEDICINE

## 2025-02-28 PROCEDURE — G8399 PT W/DXA RESULTS DOCUMENT: HCPCS | Performed by: INTERNAL MEDICINE

## 2025-02-28 PROCEDURE — G8427 DOCREV CUR MEDS BY ELIG CLIN: HCPCS | Performed by: INTERNAL MEDICINE

## 2025-02-28 PROCEDURE — 3074F SYST BP LT 130 MM HG: CPT | Performed by: INTERNAL MEDICINE

## 2025-02-28 PROCEDURE — 1090F PRES/ABSN URINE INCON ASSESS: CPT | Performed by: INTERNAL MEDICINE

## 2025-02-28 PROCEDURE — 1126F AMNT PAIN NOTED NONE PRSNT: CPT | Performed by: INTERNAL MEDICINE

## 2025-02-28 PROCEDURE — 99213 OFFICE O/P EST LOW 20 MIN: CPT | Performed by: INTERNAL MEDICINE

## 2025-02-28 PROCEDURE — G8420 CALC BMI NORM PARAMETERS: HCPCS | Performed by: INTERNAL MEDICINE

## 2025-02-28 RX ORDER — NEOMYCIN SULFATE, POLYMYXIN B SULFATE AND DEXAMETHASONE 3.5; 10000; 1 MG/ML; [USP'U]/ML; MG/ML
1 SUSPENSION/ DROPS OPHTHALMIC 2 TIMES DAILY
Qty: 5 ML | Refills: 0 | Status: SHIPPED | OUTPATIENT
Start: 2025-02-28 | End: 2025-03-10

## 2025-02-28 RX ORDER — AZELASTINE 1 MG/ML
SPRAY, METERED NASAL
COMMUNITY
Start: 2025-02-06

## 2025-02-28 NOTE — PROGRESS NOTES
\"Have you been to the ER, urgent care clinic since your last visit?  Hospitalized since your last visit?\"    NO    “Have you seen or consulted any other health care providers outside our system since your last visit?”    NO    Chief Complaint   Patient presents with    Rash     Around right eye upon awakening        Vitals:    02/28/25 0839   BP: 129/64   Pulse: 79   Resp: 18   Temp: 97 °F (36.1 °C)   SpO2: 95%

## 2025-02-28 NOTE — PROGRESS NOTES
The patient (or guardian, if applicable) and other individuals in attendance with the patient were advised that Artificial Intelligence will be utilized during this visit to record, process the conversation to generate a clinical note, and support improvement of the AI technology. The patient (or guardian, if applicable) and other individuals in attendance at the appointment consented to the use of AI, including the recording.     Duration 30 minutes primarily education, review of imaging, labs and records.    Assessment & Plan  1. Dacryocystitis.  A photograph of the affected area was taken for documentation purposes. The condition is not currently severe. A prescription for an ophthalmic medication will be sent to [facility]. She is advised to avoid any contact with the eye, including the application of moisturizers. Gentle facial cleansing with a warm, moist washcloth is recommended, ensuring no direct contact with the eye. If there is no improvement by Monday, a referral to Dr. Copeland will be made.          Chief Complaint   Patient presents with    Rash     Around right eye upon awakening          No orders of the defined types were placed in this encounter.      Holden Stevens MD, FACP      History of Present Illness  The patient presents for evaluation of eye tenderness.    She reports experiencing intermittent ocular discomfort, which she first noticed while watching television two nights ago. Upon awakening this morning, she found the rest of her eye to be affected. She does not recall any recent trauma to the eye but mentions a minor incident of bumping into a door, which she did not consider significant at the time. Her eyes were not adhered together upon waking this morning. She does not use any cosmetic products such as makeup or mascara but applies CeraVe moisturizer, although not in close proximity to her eyes. She is scheduled to travel to [place] on Tuesday for a .           Allergies

## 2025-03-07 ENCOUNTER — OFFICE VISIT (OUTPATIENT)
Age: 75
End: 2025-03-07
Payer: MEDICARE

## 2025-03-07 VITALS
SYSTOLIC BLOOD PRESSURE: 120 MMHG | DIASTOLIC BLOOD PRESSURE: 70 MMHG | RESPIRATION RATE: 18 BRPM | WEIGHT: 122.6 LBS | OXYGEN SATURATION: 93 % | HEART RATE: 77 BPM | BODY MASS INDEX: 20.43 KG/M2 | TEMPERATURE: 97.8 F | HEIGHT: 65 IN

## 2025-03-07 DIAGNOSIS — M81.0 AGE-RELATED OSTEOPOROSIS WITHOUT CURRENT PATHOLOGICAL FRACTURE: ICD-10-CM

## 2025-03-07 DIAGNOSIS — H61.23 BILATERAL IMPACTED CERUMEN: ICD-10-CM

## 2025-03-07 DIAGNOSIS — H04.301 DACRYOCYSTITIS OF RIGHT LACRIMAL SAC: Primary | ICD-10-CM

## 2025-03-07 PROCEDURE — 3078F DIAST BP <80 MM HG: CPT | Performed by: INTERNAL MEDICINE

## 2025-03-07 PROCEDURE — G8428 CUR MEDS NOT DOCUMENT: HCPCS | Performed by: INTERNAL MEDICINE

## 2025-03-07 PROCEDURE — G8399 PT W/DXA RESULTS DOCUMENT: HCPCS | Performed by: INTERNAL MEDICINE

## 2025-03-07 PROCEDURE — 1123F ACP DISCUSS/DSCN MKR DOCD: CPT | Performed by: INTERNAL MEDICINE

## 2025-03-07 PROCEDURE — 99213 OFFICE O/P EST LOW 20 MIN: CPT | Performed by: INTERNAL MEDICINE

## 2025-03-07 PROCEDURE — 1126F AMNT PAIN NOTED NONE PRSNT: CPT | Performed by: INTERNAL MEDICINE

## 2025-03-07 PROCEDURE — G8420 CALC BMI NORM PARAMETERS: HCPCS | Performed by: INTERNAL MEDICINE

## 2025-03-07 PROCEDURE — 1036F TOBACCO NON-USER: CPT | Performed by: INTERNAL MEDICINE

## 2025-03-07 PROCEDURE — 1090F PRES/ABSN URINE INCON ASSESS: CPT | Performed by: INTERNAL MEDICINE

## 2025-03-07 PROCEDURE — 3074F SYST BP LT 130 MM HG: CPT | Performed by: INTERNAL MEDICINE

## 2025-03-07 PROCEDURE — 3017F COLORECTAL CA SCREEN DOC REV: CPT | Performed by: INTERNAL MEDICINE

## 2025-03-07 NOTE — PROGRESS NOTES
The patient (or guardian, if applicable) and other individuals in attendance with the patient were advised that Artificial Intelligence will be utilized during this visit to record, process the conversation to generate a clinical note, and support improvement of the AI technology. The patient (or guardian, if applicable) and other individuals in attendance at the appointment consented to the use of AI, including the recording.      Duration 20 minutes primarily education, review of imaging, labs and records.    Assessment & Plan  1. Dacrocystitis.  She reports experiencing itchiness in the corner of her eyes. She has been advised to continue using Systane drops as needed, including at night.    2. Osteoporosis.  She is currently on teriparatide (Forteo) therapy. She has been informed that it is safe to discontinue this medication for a period of up to 2 weeks without any adverse effects. She has been advised not to take the medication during her 3-week trip and to resume it upon her return. The importance of maintaining the medication at the appropriate temperature (36-41 degrees) has been emphasized.          Chief Complaint   Patient presents with    Ear Fullness         No orders of the defined types were placed in this encounter.      Holden Stevens MD, FACP      History of Present Illness  The patient presents for evaluation of ocular discomfort and osteoporosis.    She reports an improvement in her ocular condition, with the exception of persistent itching at the corner of her eyes. She has been using Systane drops intermittently during the night but is uncertain about the compatibility of these drops with her current medication regimen.    She is currently on a teriparatide regimen and is planning a [place] trip [place]. She expresses concern about potential disruptions to her medication schedule due to time zone changes and wonders if missing a day or two of her medication would be

## 2025-03-10 ENCOUNTER — TELEPHONE (OUTPATIENT)
Age: 75
End: 2025-03-10

## 2025-03-10 NOTE — TELEPHONE ENCOUNTER
Please call pt at [phone]    Re: pt seen in office on 3/7/2025 for bacterial infection in eye  Not getting better/scheduled to go out of the country later this week  Wanting to know if she can get refill of antibiotic that was prescribed on 3/7/2025  Informed pt that Dr. Stevens on vacation

## 2025-06-05 DIAGNOSIS — H04.301 DACRYOCYSTITIS OF RIGHT LACRIMAL SAC: ICD-10-CM

## 2025-06-05 RX ORDER — NEOMYCIN SULFATE, POLYMYXIN B SULFATE AND DEXAMETHASONE 3.5; 10000; 1 MG/ML; [USP'U]/ML; MG/ML
SUSPENSION/ DROPS OPHTHALMIC
COMMUNITY
Start: 2025-03-11

## 2025-06-06 ENCOUNTER — OFFICE VISIT (OUTPATIENT)
Age: 75
End: 2025-06-06
Payer: MEDICARE

## 2025-06-06 VITALS
BODY MASS INDEX: 20.63 KG/M2 | SYSTOLIC BLOOD PRESSURE: 108 MMHG | HEART RATE: 74 BPM | OXYGEN SATURATION: 98 % | TEMPERATURE: 98.4 F | RESPIRATION RATE: 18 BRPM | DIASTOLIC BLOOD PRESSURE: 58 MMHG | WEIGHT: 124 LBS

## 2025-06-06 DIAGNOSIS — H01.005 BLEPHARITIS OF LEFT LOWER EYELID, UNSPECIFIED TYPE: Primary | ICD-10-CM

## 2025-06-06 PROCEDURE — 1126F AMNT PAIN NOTED NONE PRSNT: CPT | Performed by: INTERNAL MEDICINE

## 2025-06-06 PROCEDURE — 99213 OFFICE O/P EST LOW 20 MIN: CPT | Performed by: INTERNAL MEDICINE

## 2025-06-06 PROCEDURE — 3074F SYST BP LT 130 MM HG: CPT | Performed by: INTERNAL MEDICINE

## 2025-06-06 PROCEDURE — G8420 CALC BMI NORM PARAMETERS: HCPCS | Performed by: INTERNAL MEDICINE

## 2025-06-06 PROCEDURE — G8399 PT W/DXA RESULTS DOCUMENT: HCPCS | Performed by: INTERNAL MEDICINE

## 2025-06-06 PROCEDURE — 1036F TOBACCO NON-USER: CPT | Performed by: INTERNAL MEDICINE

## 2025-06-06 PROCEDURE — 3078F DIAST BP <80 MM HG: CPT | Performed by: INTERNAL MEDICINE

## 2025-06-06 PROCEDURE — G8427 DOCREV CUR MEDS BY ELIG CLIN: HCPCS | Performed by: INTERNAL MEDICINE

## 2025-06-06 PROCEDURE — 1090F PRES/ABSN URINE INCON ASSESS: CPT | Performed by: INTERNAL MEDICINE

## 2025-06-06 PROCEDURE — 1159F MED LIST DOCD IN RCRD: CPT | Performed by: INTERNAL MEDICINE

## 2025-06-06 PROCEDURE — 1123F ACP DISCUSS/DSCN MKR DOCD: CPT | Performed by: INTERNAL MEDICINE

## 2025-06-06 RX ORDER — NEOMYCIN SULFATE, POLYMYXIN B SULFATE AND DEXAMETHASONE 3.5; 10000; 1 MG/ML; [USP'U]/ML; MG/ML
SUSPENSION/ DROPS OPHTHALMIC
Qty: 5 ML | Refills: 0 | Status: SHIPPED | OUTPATIENT
Start: 2025-06-06

## 2025-06-06 NOTE — PROGRESS NOTES
Mindy Del Cid is a 75 y.o. female presenting for/with:    Chief Complaint   Patient presents with    Eye Problem     Left eye is irritated, swollen, and red. Previously was seen before due to this problem which she was prescribed Zeyad/Poly/Dex Opth for 10 days which helped a lot.       Vitals:    06/06/25 0952   BP: (!) 108/58   BP Site: Left Upper Arm   Patient Position: Sitting   BP Cuff Size: Medium Adult   Pulse: 74   Resp: 18   Temp: 98.4 °F (36.9 °C)   TempSrc: Temporal   SpO2: 98%   Weight: 56.2 kg (124 lb)       Pain Scale: 0 - No pain/10  Pain Location:     \"Have you been to the ER, urgent care clinic since your last visit?  Hospitalized since your last visit?\"    NO    “Have you seen or consulted any other health care providers outside of [facility] since your last visit?”    NO                 2/6/2025     3:02 PM   PHQ-9    Little interest or pleasure in doing things 0   Feeling down, depressed, or hopeless 0   PHQ-2 Score 0   PHQ-9 Total Score 0           2/6/2025     2:50 PM 1/5/2024     1:00 PM 8/10/2023     9:30 AM 8/4/2022    12:00 AM   [facility] AMB LEARNING ASSESSMENT   Primary Learner Patient Patient Patient Patient   Primary Language ENGLISH ENGLISH ENGLISH ENGLISH   Learning Preference DEMONSTRATION DEMONSTRATION DEMONSTRATION VIDEOS    PICTURES   Answered By patient patient patient self   Relationship to Learner SELF SELF SELF SELF            6/6/2025     9:51 AM   Amb Fall Risk Assessment and TUG Test   Do you feel unsteady or are you worried about falling?  no   2 or more falls in past year? no   Fall with injury in past year? no           6/6/2025     9:00 AM 2/6/2025     3:00 PM 8/12/2024    11:00 AM 1/5/2024     1:00 PM 11/6/2023     9:00 AM 8/10/2023     9:00 AM 7/31/2023     9:00 AM   ADL ASSESSMENT   Feeding yourself No Help Needed No Help Needed No Help Needed No Help Needed No Help Needed No Help Needed No Help Needed   Getting from bed to chair No Help Needed No Help Needed No

## 2025-06-06 NOTE — PROGRESS NOTES
Assessment & Plan  1. Blepharitis.  - The condition appears to be partially managed with the current treatment regimen.  - A prescription for Maxitrol (neomycin, polymyxin, and steroid) eye drops will be provided, to be used for a duration of 5 days.  - The potential risks associated with prolonged use of steroid-containing eye drops, including the possibility of triggering glaucoma and cataracts, were discussed.  - She was advised to maintain good ocular hygiene by gently washing around her eyes and eyelashes with Ned's No More Tears shampoo every morning. A follow-up appointment with Dr. Copeland or one of her colleagues is recommended within the next month for further evaluation.          Chief Complaint   Patient presents with    Eye Problem     Left eye is irritated, swollen, and red. Previously was seen before due to this problem which she was prescribed Zeyad/Poly/Dex Opth for 10 days which helped a lot.         No orders of the defined types were placed in this encounter.      Holden Stevens MD, FACP      History of Present Illness  The patient presents for evaluation of left eye irritation.    She reports experiencing symptoms similar to those encountered in February 2025, which began on Monday night. She describes a sensation of irritation in the corner of her left eye, persisting even after blinking. She has been waking up at night and has been washing her hands and eyes to ensure cleanliness. There is no significant redness in the white part of her eye or increased visibility of blood vessels. She has been applying eye drops twice daily in both eyes but is uncertain of their efficacy. She has an upcoming annual appointment with Dr. Copeland next month.     She has a history of chalazion, diagnosed by Dr. Jaquelin Copeland during their last consultation in winter. She does not report any issues with dry eyes but uses eye drops as a precautionary measure if she wakes up during the night. She also notes that her

## 2025-07-08 ENCOUNTER — PATIENT MESSAGE (OUTPATIENT)
Age: 75
End: 2025-07-08

## 2025-07-10 ENCOUNTER — OFFICE VISIT (OUTPATIENT)
Age: 75
End: 2025-07-10
Payer: MEDICARE

## 2025-07-10 ENCOUNTER — TELEPHONE (OUTPATIENT)
Age: 75
End: 2025-07-10

## 2025-07-10 VITALS
RESPIRATION RATE: 18 BRPM | OXYGEN SATURATION: 96 % | TEMPERATURE: 98.4 F | SYSTOLIC BLOOD PRESSURE: 140 MMHG | DIASTOLIC BLOOD PRESSURE: 70 MMHG

## 2025-07-10 DIAGNOSIS — R05.1 ACUTE COUGH: Primary | ICD-10-CM

## 2025-07-10 PROCEDURE — 3078F DIAST BP <80 MM HG: CPT | Performed by: INTERNAL MEDICINE

## 2025-07-10 PROCEDURE — 99214 OFFICE O/P EST MOD 30 MIN: CPT | Performed by: INTERNAL MEDICINE

## 2025-07-10 PROCEDURE — 1159F MED LIST DOCD IN RCRD: CPT | Performed by: INTERNAL MEDICINE

## 2025-07-10 PROCEDURE — 3077F SYST BP >= 140 MM HG: CPT | Performed by: INTERNAL MEDICINE

## 2025-07-10 PROCEDURE — G8420 CALC BMI NORM PARAMETERS: HCPCS | Performed by: INTERNAL MEDICINE

## 2025-07-10 PROCEDURE — 1090F PRES/ABSN URINE INCON ASSESS: CPT | Performed by: INTERNAL MEDICINE

## 2025-07-10 PROCEDURE — 1123F ACP DISCUSS/DSCN MKR DOCD: CPT | Performed by: INTERNAL MEDICINE

## 2025-07-10 PROCEDURE — G8427 DOCREV CUR MEDS BY ELIG CLIN: HCPCS | Performed by: INTERNAL MEDICINE

## 2025-07-10 PROCEDURE — G8399 PT W/DXA RESULTS DOCUMENT: HCPCS | Performed by: INTERNAL MEDICINE

## 2025-07-10 PROCEDURE — 1036F TOBACCO NON-USER: CPT | Performed by: INTERNAL MEDICINE

## 2025-07-10 RX ORDER — BENZONATATE 100 MG/1
100 CAPSULE ORAL 3 TIMES DAILY PRN
Qty: 30 CAPSULE | Refills: 1 | Status: SHIPPED | OUTPATIENT
Start: 2025-07-10 | End: 2025-07-20

## 2025-07-10 NOTE — PROGRESS NOTES
Duration 30 minutes primarily education, review of imaging, labs and records.    Assessment & Plan  1. Cough.  - Symptoms suggest a reaction to inhaled substances from recent power washing activity.  - No fluid or pneumonia-like sounds in lungs; lung tissue likely irritated similar to smoke or dust inhalation.  - Expected gradual improvement over the next 3 weeks, with complete resolution by the first or second week of 08/2025.  - Advised to use over-the-counter cough remedies such as Halls cough drops with honey or Ricola cough drops, and tea with honey for bronchodilation and cough suppression. Return for further evaluation if symptoms worsen, fever develops, or phlegm is produced.    2. Hypertension.  - Reported blood pressure reading of 174/101 upon waking.  - Currently taking labetalol twice a day.  - Blood pressure measured at 140/70 during the visit.  - Advised to continue current medication regimen and monitor blood pressure regularly.          Chief Complaint   Patient presents with    Congestion     Patient states that she was using Zep Mold & Mildew  while power washing and wasn't using a mask. Patient states that the symptoms started a day after, she states that she also noticed a spike in her blood pressure.          Orders Placed This Encounter   Procedures    XR CHEST (2 VW)     Standing Status:   Future     Expected Date:   7/10/2025     Expiration Date:   8/10/2025     Scheduling Instructions:      [facility]     Reason for exam::   cough after inhaling cleaning agents       Holden Stevens MD, FACP      History of Present Illness  The patient presents for evaluation of a persistent cough and elevated blood pressure.    She reports that the cough has been disrupting her sleep and is non-productive. She is concerned about potential lung damage. On Sunday, she was power washing her carport and summer house, which had significant dirt and mold. She used a cleaning chemical called Zep during this

## 2025-07-10 NOTE — PROGRESS NOTES
Mindy Del Cid is a 75 y.o. female presenting for/with:    Chief Complaint   Patient presents with    Congestion     Patient states that she was using Zep Mold & Mildew  while power washing and wasn't using a mask. Patient states that the symptoms started a day after, she states that she also noticed a spike in her blood pressure.        Vitals:    07/10/25 0900   Resp: 18   Temp: 99.3 °F (37.4 °C)   TempSrc: Temporal   SpO2: 94%       Pain Scale: /10  Pain Location:     \"Have you been to the ER, urgent care clinic since your last visit?  Hospitalized since your last visit?\"    NO    “Have you seen or consulted any other health care providers outside of [facility] since your last visit?”    NO                 2/6/2025     3:02 PM   PHQ-9    Little interest or pleasure in doing things 0   Feeling down, depressed, or hopeless 0   PHQ-2 Score 0   PHQ-9 Total Score 0           2/6/2025     2:50 PM 1/5/2024     1:00 PM 8/10/2023     9:30 AM 8/4/2022    12:00 AM   [facility] AMB LEARNING ASSESSMENT   Primary Learner Patient Patient Patient Patient   Primary Language ENGLISH ENGLISH ENGLISH ENGLISH   Learning Preference DEMONSTRATION DEMONSTRATION DEMONSTRATION VIDEOS    PICTURES   Answered By patient patient patient self   Relationship to Learner SELF SELF SELF SELF            6/6/2025     9:51 AM   Amb Fall Risk Assessment and TUG Test   Do you feel unsteady or are you worried about falling?  no   2 or more falls in past year? no   Fall with injury in past year? no           6/6/2025     9:00 AM 2/6/2025     3:00 PM 8/12/2024    11:00 AM 1/5/2024     1:00 PM 11/6/2023     9:00 AM 8/10/2023     9:00 AM 7/31/2023     9:00 AM   ADL ASSESSMENT   Feeding yourself No Help Needed No Help Needed No Help Needed No Help Needed No Help Needed No Help Needed No Help Needed   Getting from bed to chair No Help Needed No Help Needed No Help Needed No Help Needed No Help Needed No Help Needed No Help Needed   Getting dressed

## 2025-08-11 SDOH — HEALTH STABILITY: PHYSICAL HEALTH: ON AVERAGE, HOW MANY MINUTES DO YOU ENGAGE IN EXERCISE AT THIS LEVEL?: 30 MIN

## 2025-08-11 SDOH — HEALTH STABILITY: PHYSICAL HEALTH: ON AVERAGE, HOW MANY DAYS PER WEEK DO YOU ENGAGE IN MODERATE TO STRENUOUS EXERCISE (LIKE A BRISK WALK)?: 2 DAYS

## 2025-08-11 ASSESSMENT — LIFESTYLE VARIABLES
HOW OFTEN DURING THE LAST YEAR HAVE YOU BEEN UNABLE TO REMEMBER WHAT HAPPENED THE NIGHT BEFORE BECAUSE YOU HAD BEEN DRINKING: NEVER
HOW OFTEN DURING THE LAST YEAR HAVE YOU FOUND THAT YOU WERE NOT ABLE TO STOP DRINKING ONCE YOU HAD STARTED: NEVER
HOW OFTEN DURING THE LAST YEAR HAVE YOU FAILED TO DO WHAT WAS NORMALLY EXPECTED FROM YOU BECAUSE OF DRINKING: NEVER
HOW MANY STANDARD DRINKS CONTAINING ALCOHOL DO YOU HAVE ON A TYPICAL DAY: 1 OR 2
HOW OFTEN DURING THE LAST YEAR HAVE YOU HAD A FEELING OF GUILT OR REMORSE AFTER DRINKING: NEVER
HOW OFTEN DURING THE LAST YEAR HAVE YOU NEEDED AN ALCOHOLIC DRINK FIRST THING IN THE MORNING TO GET YOURSELF GOING AFTER A NIGHT OF HEAVY DRINKING: NEVER
HOW OFTEN DO YOU HAVE A DRINK CONTAINING ALCOHOL: 5
HAS A RELATIVE, FRIEND, DOCTOR, OR ANOTHER HEALTH PROFESSIONAL EXPRESSED CONCERN ABOUT YOUR DRINKING OR SUGGESTED YOU CUT DOWN: NO
HAVE YOU OR SOMEONE ELSE BEEN INJURED AS A RESULT OF YOUR DRINKING: NO
HAS A RELATIVE, FRIEND, DOCTOR, OR ANOTHER HEALTH PROFESSIONAL EXPRESSED CONCERN ABOUT YOUR DRINKING OR SUGGESTED YOU CUT DOWN: NO
HOW OFTEN DURING THE LAST YEAR HAVE YOU HAD A FEELING OF GUILT OR REMORSE AFTER DRINKING: NEVER
HOW OFTEN DO YOU HAVE SIX OR MORE DRINKS ON ONE OCCASION: 1
HAVE YOU OR SOMEONE ELSE BEEN INJURED AS A RESULT OF YOUR DRINKING: NO
HOW MANY STANDARD DRINKS CONTAINING ALCOHOL DO YOU HAVE ON A TYPICAL DAY: 1
HOW OFTEN DURING THE LAST YEAR HAVE YOU BEEN UNABLE TO REMEMBER WHAT HAPPENED THE NIGHT BEFORE BECAUSE YOU HAD BEEN DRINKING: NEVER
HOW OFTEN DURING THE LAST YEAR HAVE YOU FOUND THAT YOU WERE NOT ABLE TO STOP DRINKING ONCE YOU HAD STARTED: NEVER
HOW OFTEN DURING THE LAST YEAR HAVE YOU NEEDED AN ALCOHOLIC DRINK FIRST THING IN THE MORNING TO GET YOURSELF GOING AFTER A NIGHT OF HEAVY DRINKING: NEVER
HOW OFTEN DURING THE LAST YEAR HAVE YOU FAILED TO DO WHAT WAS NORMALLY EXPECTED FROM YOU BECAUSE OF DRINKING: NEVER
HOW OFTEN DO YOU HAVE A DRINK CONTAINING ALCOHOL: 4 OR MORE TIMES A WEEK

## 2025-08-11 ASSESSMENT — PATIENT HEALTH QUESTIONNAIRE - PHQ9
SUM OF ALL RESPONSES TO PHQ QUESTIONS 1-9: 0
SUM OF ALL RESPONSES TO PHQ QUESTIONS 1-9: 0
2. FEELING DOWN, DEPRESSED OR HOPELESS: NOT AT ALL
1. LITTLE INTEREST OR PLEASURE IN DOING THINGS: NOT AT ALL
SUM OF ALL RESPONSES TO PHQ QUESTIONS 1-9: 0
SUM OF ALL RESPONSES TO PHQ QUESTIONS 1-9: 0

## 2025-08-14 ENCOUNTER — OFFICE VISIT (OUTPATIENT)
Age: 75
End: 2025-08-14
Payer: MEDICARE

## 2025-08-14 VITALS
SYSTOLIC BLOOD PRESSURE: 120 MMHG | TEMPERATURE: 98.1 F | BODY MASS INDEX: 20.79 KG/M2 | HEART RATE: 76 BPM | OXYGEN SATURATION: 95 % | HEIGHT: 65 IN | DIASTOLIC BLOOD PRESSURE: 60 MMHG | RESPIRATION RATE: 17 BRPM | WEIGHT: 124.8 LBS

## 2025-08-14 DIAGNOSIS — R30.0 DYSURIA: ICD-10-CM

## 2025-08-14 DIAGNOSIS — Z00.00 MEDICARE ANNUAL WELLNESS VISIT, SUBSEQUENT: Primary | ICD-10-CM

## 2025-08-14 DIAGNOSIS — M81.0 AGE-RELATED OSTEOPOROSIS WITHOUT CURRENT PATHOLOGICAL FRACTURE: ICD-10-CM

## 2025-08-14 DIAGNOSIS — I10 PRIMARY HYPERTENSION: ICD-10-CM

## 2025-08-14 DIAGNOSIS — E78.2 MIXED HYPERLIPIDEMIA: ICD-10-CM

## 2025-08-14 DIAGNOSIS — E55.9 VITAMIN D DEFICIENCY: ICD-10-CM

## 2025-08-14 PROCEDURE — 1159F MED LIST DOCD IN RCRD: CPT | Performed by: INTERNAL MEDICINE

## 2025-08-14 PROCEDURE — G8420 CALC BMI NORM PARAMETERS: HCPCS | Performed by: INTERNAL MEDICINE

## 2025-08-14 PROCEDURE — 1123F ACP DISCUSS/DSCN MKR DOCD: CPT | Performed by: INTERNAL MEDICINE

## 2025-08-14 PROCEDURE — G0439 PPPS, SUBSEQ VISIT: HCPCS | Performed by: INTERNAL MEDICINE

## 2025-08-14 PROCEDURE — 99214 OFFICE O/P EST MOD 30 MIN: CPT | Performed by: INTERNAL MEDICINE

## 2025-08-14 PROCEDURE — 1036F TOBACCO NON-USER: CPT | Performed by: INTERNAL MEDICINE

## 2025-08-14 PROCEDURE — 1126F AMNT PAIN NOTED NONE PRSNT: CPT | Performed by: INTERNAL MEDICINE

## 2025-08-14 PROCEDURE — 3078F DIAST BP <80 MM HG: CPT | Performed by: INTERNAL MEDICINE

## 2025-08-14 PROCEDURE — 3074F SYST BP LT 130 MM HG: CPT | Performed by: INTERNAL MEDICINE

## 2025-08-14 PROCEDURE — G8399 PT W/DXA RESULTS DOCUMENT: HCPCS | Performed by: INTERNAL MEDICINE

## 2025-08-14 PROCEDURE — 1090F PRES/ABSN URINE INCON ASSESS: CPT | Performed by: INTERNAL MEDICINE

## 2025-08-14 PROCEDURE — G8427 DOCREV CUR MEDS BY ELIG CLIN: HCPCS | Performed by: INTERNAL MEDICINE

## 2025-08-14 PROCEDURE — G2211 COMPLEX E/M VISIT ADD ON: HCPCS | Performed by: INTERNAL MEDICINE

## 2025-08-14 SDOH — HEALTH STABILITY: MENTAL HEALTH
DO YOU FEEL STRESS - TENSE, RESTLESS, NERVOUS, OR ANXIOUS, OR UNABLE TO SLEEP AT NIGHT BECAUSE YOUR MIND IS TROUBLED ALL THE TIME - THESE DAYS?: ONLY A LITTLE

## 2025-08-14 SDOH — HEALTH STABILITY: MENTAL HEALTH: HOW MANY DRINKS CONTAINING ALCOHOL DO YOU HAVE ON A TYPICAL DAY WHEN YOU ARE DRINKING?: 1 OR 2

## 2025-08-14 SDOH — SOCIAL STABILITY: SOCIAL NETWORK: HOW OFTEN DO YOU GET TOGETHER WITH FRIENDS OR RELATIVES?: MORE THAN THREE TIMES A WEEK

## 2025-08-14 SDOH — SOCIAL STABILITY: SOCIAL INSECURITY
WITHIN THE LAST YEAR, HAVE YOU BEEN RAPED OR FORCED TO HAVE ANY KIND OF SEXUAL ACTIVITY BY YOUR PARTNER OR EX-PARTNER?: NO

## 2025-08-14 SDOH — SOCIAL STABILITY: SOCIAL INSECURITY
WITHIN THE LAST YEAR, HAVE YOU BEEN KICKED, HIT, SLAPPED, OR OTHERWISE PHYSICALLY HURT BY YOUR PARTNER OR EX-PARTNER?: NO

## 2025-08-14 SDOH — ECONOMIC STABILITY: FOOD INSECURITY: WITHIN THE PAST 12 MONTHS, YOU WORRIED THAT YOUR FOOD WOULD RUN OUT BEFORE YOU GOT THE MONEY TO BUY MORE.: NEVER TRUE

## 2025-08-14 SDOH — HEALTH STABILITY: MENTAL HEALTH: HOW OFTEN DO YOU HAVE A DRINK CONTAINING ALCOHOL?: 4 OR MORE TIMES A WEEK

## 2025-08-14 SDOH — SOCIAL STABILITY: SOCIAL INSECURITY: WITHIN THE LAST YEAR, HAVE YOU BEEN HUMILIATED OR EMOTIONALLY ABUSED IN OTHER WAYS BY YOUR PARTNER OR EX-PARTNER?: NO

## 2025-08-14 SDOH — ECONOMIC STABILITY: TRANSPORTATION INSECURITY: IN THE PAST 12 MONTHS, HAS LACK OF TRANSPORTATION KEPT YOU FROM MEDICAL APPOINTMENTS OR FROM GETTING MEDICATIONS?: NO

## 2025-08-14 SDOH — SOCIAL STABILITY: SOCIAL INSECURITY: WITHIN THE LAST YEAR, HAVE YOU BEEN AFRAID OF YOUR PARTNER OR EX-PARTNER?: NO

## 2025-08-14 SDOH — ECONOMIC STABILITY: HOUSING INSECURITY: IN THE LAST 12 MONTHS, WAS THERE A TIME WHEN YOU WERE NOT ABLE TO PAY THE MORTGAGE OR RENT ON TIME?: NO

## 2025-08-14 SDOH — SOCIAL STABILITY: SOCIAL INSECURITY: ARE YOU MARRIED, WIDOWED, DIVORCED, SEPARATED, NEVER MARRIED, OR LIVING WITH A PARTNER?: MARRIED

## 2025-08-14 SDOH — ECONOMIC STABILITY: FOOD INSECURITY: HOW HARD IS IT FOR YOU TO PAY FOR THE VERY BASICS LIKE FOOD, HOUSING, MEDICAL CARE, AND HEATING?: NOT HARD AT ALL

## 2025-08-14 SDOH — SOCIAL STABILITY: SOCIAL NETWORK: HOW OFTEN DO YOU ATTEND CHURCH OR RELIGIOUS SERVICES?: NEVER

## 2025-08-14 SDOH — HEALTH STABILITY: PHYSICAL HEALTH: ON AVERAGE, HOW MANY DAYS PER WEEK DO YOU ENGAGE IN MODERATE TO STRENUOUS EXERCISE (LIKE A BRISK WALK)?: 7 DAYS

## 2025-08-14 SDOH — SOCIAL STABILITY: SOCIAL NETWORK
DO YOU BELONG TO ANY CLUBS OR ORGANIZATIONS SUCH AS CHURCH GROUPS, UNIONS, FRATERNAL OR ATHLETIC GROUPS, OR SCHOOL GROUPS?: NO

## 2025-08-14 SDOH — ECONOMIC STABILITY: FOOD INSECURITY: WITHIN THE PAST 12 MONTHS, THE FOOD YOU BOUGHT JUST DIDN'T LAST AND YOU DIDN'T HAVE MONEY TO GET MORE.: NEVER TRUE

## 2025-08-14 SDOH — SOCIAL STABILITY: SOCIAL NETWORK
IN A TYPICAL WEEK, HOW MANY TIMES DO YOU TALK ON THE PHONE WITH FAMILY, FRIENDS, OR NEIGHBORS?: MORE THAN THREE TIMES A WEEK

## 2025-08-14 SDOH — SOCIAL STABILITY: SOCIAL NETWORK: HOW OFTEN DO YOU ATTEND MEETINGS OF THE CLUBS OR ORGANIZATIONS YOU BELONG TO?: NEVER

## 2025-08-14 SDOH — HEALTH STABILITY: PHYSICAL HEALTH: ON AVERAGE, HOW MANY MINUTES DO YOU ENGAGE IN EXERCISE AT THIS LEVEL?: 150+ MIN

## 2025-08-14 ASSESSMENT — ACTIVITIES OF DAILY LIVING (ADL): LACK_OF_TRANSPORTATION: NO

## 2025-08-15 LAB
25(OH)D3 SERPL-MCNC: 48.6 NG/ML (ref 30–100)
ALBUMIN SERPL-MCNC: 3.8 G/DL (ref 3.5–5.2)
ALBUMIN/GLOB SERPL: 1.6 (ref 1.1–2.2)
ALP SERPL-CCNC: 80 U/L (ref 35–104)
ALT SERPL-CCNC: 17 U/L (ref 10–35)
ANION GAP SERPL CALC-SCNC: 8 MMOL/L (ref 2–14)
APPEARANCE UR: CLEAR
AST SERPL-CCNC: 19 U/L (ref 10–35)
BACTERIA URNS QL MICRO: NEGATIVE /HPF
BASOPHILS # BLD: 0.05 K/UL (ref 0–0.1)
BASOPHILS NFR BLD: 0.8 % (ref 0–1)
BILIRUB SERPL-MCNC: 0.5 MG/DL (ref 0–1.2)
BILIRUB UR QL: NEGATIVE
BUN SERPL-MCNC: 20 MG/DL (ref 8–23)
BUN/CREAT SERPL: 27 (ref 12–20)
CALCIUM SERPL-MCNC: 10 MG/DL (ref 8.8–10.2)
CHLORIDE SERPL-SCNC: 105 MMOL/L (ref 98–107)
CHOLEST SERPL-MCNC: 172 MG/DL (ref 0–200)
CO2 SERPL-SCNC: 31 MMOL/L (ref 20–29)
COLOR UR: ABNORMAL
CREAT SERPL-MCNC: 0.74 MG/DL (ref 0.6–1)
DIFFERENTIAL METHOD BLD: ABNORMAL
EOSINOPHIL # BLD: 0.15 K/UL (ref 0–0.4)
EOSINOPHIL NFR BLD: 2.5 % (ref 0–7)
EPITH CASTS URNS QL MICRO: ABNORMAL /LPF
ERYTHROCYTE [DISTWIDTH] IN BLOOD BY AUTOMATED COUNT: 13.1 % (ref 11.5–14.5)
GLOBULIN SER CALC-MCNC: 2.4 G/DL (ref 2–4)
GLUCOSE SERPL-MCNC: 59 MG/DL (ref 65–100)
GLUCOSE UR STRIP.AUTO-MCNC: NEGATIVE MG/DL
HCT VFR BLD AUTO: 37.5 % (ref 35–47)
HDLC SERPL-MCNC: 61 MG/DL (ref 40–60)
HDLC SERPL: 2.8 (ref 0–5)
HGB BLD-MCNC: 11.8 G/DL (ref 11.5–16)
HGB UR QL STRIP: NEGATIVE
HYALINE CASTS URNS QL MICRO: ABNORMAL /LPF (ref 0–5)
IMM GRANULOCYTES # BLD AUTO: 0.01 K/UL (ref 0–0.04)
IMM GRANULOCYTES NFR BLD AUTO: 0.2 % (ref 0–0.5)
KETONES UR QL STRIP.AUTO: NEGATIVE MG/DL
LDLC SERPL CALC-MCNC: 90 MG/DL (ref 0–100)
LEUKOCYTE ESTERASE UR QL STRIP.AUTO: ABNORMAL
LYMPHOCYTES # BLD: 1.45 K/UL (ref 0.8–3.5)
LYMPHOCYTES NFR BLD: 24.6 % (ref 12–49)
MCH RBC QN AUTO: 32.5 PG (ref 26–34)
MCHC RBC AUTO-ENTMCNC: 31.5 G/DL (ref 30–36.5)
MCV RBC AUTO: 103.3 FL (ref 80–99)
MONOCYTES # BLD: 0.63 K/UL (ref 0–1)
MONOCYTES NFR BLD: 10.7 % (ref 5–13)
NEUTS SEG # BLD: 3.61 K/UL (ref 1.8–8)
NEUTS SEG NFR BLD: 61.2 % (ref 32–75)
NITRITE UR QL STRIP.AUTO: NEGATIVE
NRBC # BLD: 0 K/UL (ref 0–0.01)
NRBC BLD-RTO: 0 PER 100 WBC
PH UR STRIP: 8 (ref 5–8)
PLATELET # BLD AUTO: 198 K/UL (ref 150–400)
PMV BLD AUTO: 11.6 FL (ref 8.9–12.9)
POTASSIUM SERPL-SCNC: 4.1 MMOL/L (ref 3.5–5.1)
PROT SERPL-MCNC: 6.2 G/DL (ref 6.4–8.3)
PROT UR STRIP-MCNC: NEGATIVE MG/DL
RBC # BLD AUTO: 3.63 M/UL (ref 3.8–5.2)
RBC #/AREA URNS HPF: ABNORMAL /HPF (ref 0–5)
SODIUM SERPL-SCNC: 144 MMOL/L (ref 136–145)
SP GR UR REFRACTOMETRY: 1.01 (ref 1–1.03)
TRIGL SERPL-MCNC: 104 MG/DL (ref 0–150)
TSH, 3RD GENERATION: 1.88 UIU/ML (ref 0.27–4.2)
URINE CULTURE IF INDICATED: ABNORMAL
UROBILINOGEN UR QL STRIP.AUTO: 0.2 EU/DL (ref 0.2–1)
VLDLC SERPL CALC-MCNC: 21 MG/DL
WBC # BLD AUTO: 5.9 K/UL (ref 3.6–11)
WBC URNS QL MICRO: ABNORMAL /HPF (ref 0–4)

## 2025-08-16 ENCOUNTER — RESULTS FOLLOW-UP (OUTPATIENT)
Age: 75
End: 2025-08-16

## 2025-08-30 ENCOUNTER — HOSPITAL ENCOUNTER (EMERGENCY)
Facility: HOSPITAL | Age: 75
Discharge: HOME | End: 2025-08-31
Attending: FAMILY MEDICINE | Admitting: FAMILY MEDICINE
Payer: MEDICARE

## 2025-08-30 DIAGNOSIS — I10 PRIMARY HYPERTENSION: Primary | ICD-10-CM

## 2025-08-30 PROCEDURE — 99284 EMERGENCY DEPT VISIT MOD MDM: CPT

## 2025-08-30 PROCEDURE — 93005 ELECTROCARDIOGRAM TRACING: CPT | Performed by: FAMILY MEDICINE

## 2025-08-30 ASSESSMENT — PAIN - FUNCTIONAL ASSESSMENT: PAIN_FUNCTIONAL_ASSESSMENT: 0-10

## 2025-08-30 ASSESSMENT — PAIN SCALES - GENERAL: PAINLEVEL_OUTOF10: 0

## 2025-08-31 VITALS
HEART RATE: 64 BPM | BODY MASS INDEX: 20.33 KG/M2 | TEMPERATURE: 98.1 F | SYSTOLIC BLOOD PRESSURE: 194 MMHG | OXYGEN SATURATION: 96 % | WEIGHT: 122 LBS | HEIGHT: 65 IN | DIASTOLIC BLOOD PRESSURE: 93 MMHG | RESPIRATION RATE: 15 BRPM

## 2025-08-31 PROCEDURE — 6360000002 HC RX W HCPCS: Performed by: FAMILY MEDICINE

## 2025-08-31 PROCEDURE — 96374 THER/PROPH/DIAG INJ IV PUSH: CPT

## 2025-08-31 PROCEDURE — 96375 TX/PRO/DX INJ NEW DRUG ADDON: CPT

## 2025-08-31 PROCEDURE — 96376 TX/PRO/DX INJ SAME DRUG ADON: CPT

## 2025-08-31 RX ORDER — LABETALOL HYDROCHLORIDE 5 MG/ML
10 INJECTION, SOLUTION INTRAVENOUS
Status: COMPLETED | OUTPATIENT
Start: 2025-08-31 | End: 2025-08-31

## 2025-08-31 RX ORDER — DIAZEPAM 10 MG/2ML
2.5 INJECTION, SOLUTION INTRAMUSCULAR; INTRAVENOUS ONCE
Status: COMPLETED | OUTPATIENT
Start: 2025-08-31 | End: 2025-08-31

## 2025-08-31 RX ORDER — LORAZEPAM 1 MG/1
1 TABLET ORAL ONCE
Status: DISCONTINUED | OUTPATIENT
Start: 2025-08-31 | End: 2025-08-31

## 2025-08-31 RX ADMIN — DIAZEPAM 2.5 MG: 5 INJECTION, SOLUTION INTRAMUSCULAR; INTRAVENOUS at 02:29

## 2025-08-31 RX ADMIN — LABETALOL HYDROCHLORIDE 10 MG: 5 INJECTION, SOLUTION INTRAVENOUS at 03:30

## 2025-08-31 RX ADMIN — LABETALOL HYDROCHLORIDE 10 MG: 5 INJECTION, SOLUTION INTRAVENOUS at 02:47

## 2025-08-31 ASSESSMENT — PAIN SCALES - GENERAL: PAINLEVEL_OUTOF10: 0

## 2025-09-01 LAB
EKG ATRIAL RATE: 64 BPM
EKG DIAGNOSIS: NORMAL
EKG P AXIS: 42 DEGREES
EKG P-R INTERVAL: 216 MS
EKG Q-T INTERVAL: 428 MS
EKG QRS DURATION: 88 MS
EKG QTC CALCULATION (BAZETT): 441 MS
EKG R AXIS: -6 DEGREES
EKG T AXIS: 29 DEGREES
EKG VENTRICULAR RATE: 64 BPM

## 2025-09-03 ENCOUNTER — TELEPHONE (OUTPATIENT)
Age: 75
End: 2025-09-03

## (undated) DEVICE — BANDAGE COMPR W4INXL5YD TAN COT CARING SELF ADH COHESIVE

## (undated) DEVICE — GOWN,REINFORCED,POLY,AURORA,XLARGE,STRL: Brand: MEDLINE

## (undated) DEVICE — DRAPE,EXTREMITY,89X128,STERILE: Brand: MEDLINE

## (undated) DEVICE — ZIMMER® STERILE DISPOSABLE TOURNIQUET CUFF WITH PROTECTIVE SLEEVE AND PLC, DUAL PORT, SINGLE BLADDER, 18 IN. (46 CM)

## (undated) DEVICE — LABEL STERILIZATION W/PEN -- 50/CA

## (undated) DEVICE — DRAPE SHT 3 QTR PROXIMA 53X77 --

## (undated) DEVICE — LINER,SEMI-RIGID,3000CC,50EA/CS: Brand: MEDLINE

## (undated) DEVICE — STERILE POLYISOPRENE POWDER-FREE SURGICAL GLOVES: Brand: PROTEXIS

## (undated) DEVICE — TIP CLEANER: Brand: VALLEYLAB

## (undated) DEVICE — TRAP POLYP 1 CHMBR WIDE EYE

## (undated) DEVICE — GOWN,REINFORCED,POLY,AURORA,LARGE,STRL: Brand: MEDLINE

## (undated) DEVICE — INTENDED FOR TISSUE SEPARATION, AND OTHER PROCEDURES THAT REQUIRE A SHARP SURGICAL BLADE TO PUNCTURE OR CUT.: Brand: BARD-PARKER SAFETY BLADES SIZE 15, STERILE

## (undated) DEVICE — SYR 20ML LL STRL LF --

## (undated) DEVICE — STOCKINETTE: Brand: CONVERTORS

## (undated) DEVICE — NEEDLE HYPO 18GA L1.5IN PNK POLYPR HUB S STL THN WALL FILL

## (undated) DEVICE — SNARE ENDOSCP L240CM SHTH DIA2.4MM LOOP W20MM MIN WRK CHN

## (undated) DEVICE — GAUZE,SPONGE,4"X4",16PLY,STRL,LF,10/TRAY: Brand: MEDLINE

## (undated) DEVICE — YANKAUER,TAPERED BULBOUS TIP,W/O VENT: Brand: MEDLINE

## (undated) DEVICE — C-ARM: Brand: UNBRANDED

## (undated) DEVICE — BLUNT CANNULA: Brand: MONOJECT

## (undated) DEVICE — 2.5MM DRILL BIT/QC/GOLD/110MM

## (undated) DEVICE — BANDAGE,ELASTIC,ESMARK,STERILE,4"X9',LF: Brand: MEDLINE

## (undated) DEVICE — KIT ENDO OP4 CA 1.1+ BX20

## (undated) DEVICE — X-RAY DETECTABLE SPONGES,16 PLY: Brand: VISTEC

## (undated) DEVICE — BIT DRL L125MM DIA2.7MM QUIK CPL 3 FLUT

## (undated) DEVICE — PENCIL ES L3M BTTN SWCH S STL HEX LOK BLDE ELECTRD HOLSTER

## (undated) DEVICE — APPLICATOR SCRB 26ML TEAL STRL -- CHLORAPREP 26ML

## (undated) DEVICE — SHEET,DRAPE,70X100,STERILE: Brand: MEDLINE

## (undated) DEVICE — SUTURE ETHLN SZ 3-0 L18IN NONABSORBABLE BLK FS-1 L24MM 3/8 663H

## (undated) DEVICE — TUBING, SUCTION, 1/4" X 10', STRAIGHT: Brand: MEDLINE

## (undated) DEVICE — SKIN MARKER,REGULAR TIP WITH RULER: Brand: DEVON

## (undated) DEVICE — PACK,SET UP,NO DRAPES: Brand: MEDLINE

## (undated) DEVICE — SYRINGE BLB FEED IV POLE BG 60ML

## (undated) DEVICE — 3L THIN WALL CAN: Brand: CRD

## (undated) DEVICE — PADDING CAST W4INXL4YD HIGHLY ABSRB THAN COT EZ APPL

## (undated) DEVICE — 3M™ STERI-DRAPE™ U-DRAPE 1015: Brand: STERI-DRAPE™

## (undated) DEVICE — INFECTION CONTROL KIT SYS

## (undated) DEVICE — GOWN,ISO,KNITCUFF, PREMIUM BLUE, LV3,XL: Brand: MEDLINE INDUSTRIES, INC.

## (undated) DEVICE — SUTURE VCRL 2-0 L27IN ABSRB CT BRAID COAT UD J275H

## (undated) DEVICE — 1230 DOUBLE MAGNET NEEDLE COUNTER,BLACK: Brand: DEVON

## (undated) DEVICE — ELECTRODE PT RET AD L9FT HI MOIST COND ADH HYDRGEL CORDED

## (undated) DEVICE — SPLINT CAST W4INXL15FT FBRGLS INTLOK WRINKLE FREE APPL

## (undated) DEVICE — BLANKET WRM AD PREM MISTRAL-AIR

## (undated) DEVICE — SOLUTION IRRIG 1000ML STRL H2O USP PLAS POUR BTL

## (undated) DEVICE — SOLUTION IRRIG 1000ML 0.9% SOD CHL USP POUR PLAS BTL

## (undated) DEVICE — SYRINGE 20ML LL S/C 50

## (undated) DEVICE — NEEDLE HYPO 21GA L1.5IN GRN POLYPR HUB S STL THN WALL IV